# Patient Record
Sex: MALE | Race: BLACK OR AFRICAN AMERICAN | NOT HISPANIC OR LATINO | URBAN - METROPOLITAN AREA
[De-identification: names, ages, dates, MRNs, and addresses within clinical notes are randomized per-mention and may not be internally consistent; named-entity substitution may affect disease eponyms.]

---

## 2018-07-10 ENCOUNTER — INPATIENT (INPATIENT)
Facility: HOSPITAL | Age: 63
LOS: 13 days | Discharge: HOME | End: 2018-07-24
Attending: INTERNAL MEDICINE | Admitting: INTERNAL MEDICINE
Payer: MEDICAID

## 2018-07-10 VITALS
OXYGEN SATURATION: 100 % | HEIGHT: 62 IN | WEIGHT: 125 LBS | TEMPERATURE: 99 F | HEART RATE: 86 BPM | RESPIRATION RATE: 18 BRPM | DIASTOLIC BLOOD PRESSURE: 111 MMHG | SYSTOLIC BLOOD PRESSURE: 180 MMHG

## 2018-07-10 DIAGNOSIS — I10 ESSENTIAL (PRIMARY) HYPERTENSION: ICD-10-CM

## 2018-07-10 DIAGNOSIS — I63.9 CEREBRAL INFARCTION, UNSPECIFIED: ICD-10-CM

## 2018-07-10 DIAGNOSIS — G93.41 METABOLIC ENCEPHALOPATHY: ICD-10-CM

## 2018-07-10 LAB
ALBUMIN SERPL ELPH-MCNC: 3.8 G/DL — SIGNIFICANT CHANGE UP (ref 3.3–5)
ALP SERPL-CCNC: 104 U/L — SIGNIFICANT CHANGE UP (ref 40–120)
ALT FLD-CCNC: 33 U/L — SIGNIFICANT CHANGE UP (ref 12–78)
AMPHET UR-MCNC: NEGATIVE — SIGNIFICANT CHANGE UP
ANION GAP SERPL CALC-SCNC: 7 MMOL/L — SIGNIFICANT CHANGE UP (ref 5–17)
APPEARANCE UR: CLEAR — SIGNIFICANT CHANGE UP
APTT BLD: 38.3 SEC — HIGH (ref 27.5–37.4)
AST SERPL-CCNC: 27 U/L — SIGNIFICANT CHANGE UP (ref 15–37)
BACTERIA # UR AUTO: ABNORMAL
BARBITURATES UR SCN-MCNC: NEGATIVE — SIGNIFICANT CHANGE UP
BASOPHILS # BLD AUTO: 0.03 K/UL — SIGNIFICANT CHANGE UP (ref 0–0.2)
BASOPHILS NFR BLD AUTO: 0.4 % — SIGNIFICANT CHANGE UP (ref 0–2)
BENZODIAZ UR-MCNC: NEGATIVE — SIGNIFICANT CHANGE UP
BILIRUB SERPL-MCNC: 0.6 MG/DL — SIGNIFICANT CHANGE UP (ref 0.2–1.2)
BILIRUB UR-MCNC: NEGATIVE — SIGNIFICANT CHANGE UP
BUN SERPL-MCNC: 23 MG/DL — SIGNIFICANT CHANGE UP (ref 7–23)
CALCIUM SERPL-MCNC: 9.1 MG/DL — SIGNIFICANT CHANGE UP (ref 8.5–10.1)
CHLORIDE SERPL-SCNC: 105 MMOL/L — SIGNIFICANT CHANGE UP (ref 96–108)
CO2 SERPL-SCNC: 30 MMOL/L — SIGNIFICANT CHANGE UP (ref 22–31)
COCAINE METAB.OTHER UR-MCNC: NEGATIVE — SIGNIFICANT CHANGE UP
COLOR SPEC: YELLOW — SIGNIFICANT CHANGE UP
CREAT SERPL-MCNC: 1.11 MG/DL — SIGNIFICANT CHANGE UP (ref 0.5–1.3)
DIFF PNL FLD: NEGATIVE — SIGNIFICANT CHANGE UP
EOSINOPHIL # BLD AUTO: 0.16 K/UL — SIGNIFICANT CHANGE UP (ref 0–0.5)
EOSINOPHIL NFR BLD AUTO: 2.3 % — SIGNIFICANT CHANGE UP (ref 0–6)
EPI CELLS # UR: SIGNIFICANT CHANGE UP
GLUCOSE SERPL-MCNC: 86 MG/DL — SIGNIFICANT CHANGE UP (ref 70–99)
GLUCOSE UR QL: NEGATIVE MG/DL — SIGNIFICANT CHANGE UP
HBA1C BLD-MCNC: 5.7 % — HIGH (ref 4–5.6)
HCT VFR BLD CALC: 40.2 % — SIGNIFICANT CHANGE UP (ref 39–50)
HGB BLD-MCNC: 13.1 G/DL — SIGNIFICANT CHANGE UP (ref 13–17)
IMM GRANULOCYTES NFR BLD AUTO: 0.3 % — SIGNIFICANT CHANGE UP (ref 0–1.5)
INR BLD: 1.07 RATIO — SIGNIFICANT CHANGE UP (ref 0.88–1.16)
KETONES UR-MCNC: NEGATIVE — SIGNIFICANT CHANGE UP
LEUKOCYTE ESTERASE UR-ACNC: NEGATIVE — SIGNIFICANT CHANGE UP
LYMPHOCYTES # BLD AUTO: 2.36 K/UL — SIGNIFICANT CHANGE UP (ref 1–3.3)
LYMPHOCYTES # BLD AUTO: 33.6 % — SIGNIFICANT CHANGE UP (ref 13–44)
MCHC RBC-ENTMCNC: 28.9 PG — SIGNIFICANT CHANGE UP (ref 27–34)
MCHC RBC-ENTMCNC: 32.6 GM/DL — SIGNIFICANT CHANGE UP (ref 32–36)
MCV RBC AUTO: 88.5 FL — SIGNIFICANT CHANGE UP (ref 80–100)
METHADONE UR-MCNC: NEGATIVE — SIGNIFICANT CHANGE UP
MONOCYTES # BLD AUTO: 0.7 K/UL — SIGNIFICANT CHANGE UP (ref 0–0.9)
MONOCYTES NFR BLD AUTO: 10 % — SIGNIFICANT CHANGE UP (ref 2–14)
NEUTROPHILS # BLD AUTO: 3.76 K/UL — SIGNIFICANT CHANGE UP (ref 1.8–7.4)
NEUTROPHILS NFR BLD AUTO: 53.4 % — SIGNIFICANT CHANGE UP (ref 43–77)
NITRITE UR-MCNC: NEGATIVE — SIGNIFICANT CHANGE UP
NRBC # BLD: 0 /100 WBCS — SIGNIFICANT CHANGE UP (ref 0–0)
OPIATES UR-MCNC: NEGATIVE — SIGNIFICANT CHANGE UP
PCP SPEC-MCNC: SIGNIFICANT CHANGE UP
PCP UR-MCNC: NEGATIVE — SIGNIFICANT CHANGE UP
PH UR: 6 — SIGNIFICANT CHANGE UP (ref 5–8)
PLATELET # BLD AUTO: 316 K/UL — SIGNIFICANT CHANGE UP (ref 150–400)
POTASSIUM SERPL-MCNC: 3.5 MMOL/L — SIGNIFICANT CHANGE UP (ref 3.5–5.3)
POTASSIUM SERPL-SCNC: 3.5 MMOL/L — SIGNIFICANT CHANGE UP (ref 3.5–5.3)
PROT SERPL-MCNC: 8.6 GM/DL — HIGH (ref 6–8.3)
PROT UR-MCNC: 15 MG/DL
PROTHROM AB SERPL-ACNC: 11.7 SEC — SIGNIFICANT CHANGE UP (ref 9.8–12.7)
RBC # BLD: 4.54 M/UL — SIGNIFICANT CHANGE UP (ref 4.2–5.8)
RBC # FLD: 14.4 % — SIGNIFICANT CHANGE UP (ref 10.3–14.5)
RBC CASTS # UR COMP ASSIST: SIGNIFICANT CHANGE UP /HPF (ref 0–4)
SODIUM SERPL-SCNC: 142 MMOL/L — SIGNIFICANT CHANGE UP (ref 135–145)
SP GR SPEC: 1.01 — SIGNIFICANT CHANGE UP (ref 1.01–1.02)
THC UR QL: NEGATIVE — SIGNIFICANT CHANGE UP
UROBILINOGEN FLD QL: NEGATIVE MG/DL — SIGNIFICANT CHANGE UP
WBC # BLD: 7.03 K/UL — SIGNIFICANT CHANGE UP (ref 3.8–10.5)
WBC # FLD AUTO: 7.03 K/UL — SIGNIFICANT CHANGE UP (ref 3.8–10.5)
WBC UR QL: SIGNIFICANT CHANGE UP

## 2018-07-10 PROCEDURE — 93010 ELECTROCARDIOGRAM REPORT: CPT

## 2018-07-10 PROCEDURE — 99285 EMERGENCY DEPT VISIT HI MDM: CPT

## 2018-07-10 PROCEDURE — 70450 CT HEAD/BRAIN W/O DYE: CPT | Mod: 26

## 2018-07-10 PROCEDURE — 71045 X-RAY EXAM CHEST 1 VIEW: CPT | Mod: 26

## 2018-07-10 RX ORDER — LABETALOL HCL 100 MG
10 TABLET ORAL ONCE
Qty: 0 | Refills: 0 | Status: COMPLETED | OUTPATIENT
Start: 2018-07-10 | End: 2018-07-10

## 2018-07-10 RX ORDER — AMLODIPINE BESYLATE 2.5 MG/1
10 TABLET ORAL DAILY
Qty: 0 | Refills: 0 | Status: DISCONTINUED | OUTPATIENT
Start: 2018-07-10 | End: 2018-07-24

## 2018-07-10 RX ORDER — ASPIRIN/CALCIUM CARB/MAGNESIUM 324 MG
81 TABLET ORAL DAILY
Qty: 0 | Refills: 0 | Status: DISCONTINUED | OUTPATIENT
Start: 2018-07-10 | End: 2018-07-24

## 2018-07-10 RX ORDER — METOPROLOL TARTRATE 50 MG
50 TABLET ORAL
Qty: 0 | Refills: 0 | Status: DISCONTINUED | OUTPATIENT
Start: 2018-07-10 | End: 2018-07-24

## 2018-07-10 RX ORDER — ENOXAPARIN SODIUM 100 MG/ML
40 INJECTION SUBCUTANEOUS EVERY 24 HOURS
Qty: 0 | Refills: 0 | Status: DISCONTINUED | OUTPATIENT
Start: 2018-07-10 | End: 2018-07-24

## 2018-07-10 RX ORDER — SODIUM CHLORIDE 9 MG/ML
1000 INJECTION, SOLUTION INTRAVENOUS
Qty: 0 | Refills: 0 | Status: DISCONTINUED | OUTPATIENT
Start: 2018-07-10 | End: 2018-07-15

## 2018-07-10 RX ADMIN — ENOXAPARIN SODIUM 40 MILLIGRAM(S): 100 INJECTION SUBCUTANEOUS at 21:04

## 2018-07-10 RX ADMIN — Medication 81 MILLIGRAM(S): at 21:04

## 2018-07-10 RX ADMIN — Medication 10 MILLIGRAM(S): at 13:56

## 2018-07-10 RX ADMIN — SODIUM CHLORIDE 70 MILLILITER(S): 9 INJECTION, SOLUTION INTRAVENOUS at 21:05

## 2018-07-10 RX ADMIN — AMLODIPINE BESYLATE 10 MILLIGRAM(S): 2.5 TABLET ORAL at 21:04

## 2018-07-10 RX ADMIN — Medication 50 MILLIGRAM(S): at 22:59

## 2018-07-10 NOTE — H&P ADULT - NSHPLABSRESULTS_GEN_ALL_CORE
13.1   7.03  )-----------( 316      ( 10 Jul 2018 13:49 )             40.2     07-10    142  |  105  |  23  ----------------------------<  86  3.5   |  30  |  1.11    Ca    9.1      10 Jul 2018 13:49    TPro  8.6<H>  /  Alb  3.8  /  TBili  0.6  /  DBili  x   /  AST  27  /  ALT  33  /  AlkPhos  104  07-10    PT/INR - ( 10 Jul 2018 13:49 )   PT: 11.7 sec;   INR: 1.07 ratio         PTT - ( 10 Jul 2018 13:49 )  PTT:38.3 sec

## 2018-07-10 NOTE — ED ADULT NURSE NOTE - CHIEF COMPLAINT QUOTE
visiting from [facility] since June 5  expressive aphasia since possible Sunday or earlier unknown time with weakness to left side on exam by  Dr. Atwood  abnormal c-xry with recommend airborne precautions for [employer]

## 2018-07-10 NOTE — CONSULT NOTE ADULT - SUBJECTIVE AND OBJECTIVE BOX
Patient is a 63y old  Male who presents with a chief complaint of change in mental status (10 Jul 2018 15:20)    HPI:  64 yo male with history of abnormal cxr today in office presents with confusion x coming to [facility] June 5 from [facility]. As per sister patient seems confused and verbal not responsive at times (10 Jul 2018 15:20)  Pt confused, not able to provide history. Tried to call listed number() but unsuccessful.  seen for suspected TB.    PAST MEDICAL & SURGICAL HISTORY: not available    FAMILY HISTORY:  not available    SOCIAL HISTORY: BMI (kg/m2): 22.9 . not available    Allergies  No Known Allergies    MEDICATIONS  (STANDING):  amLODIPine   Tablet 10 milliGRAM(s) Oral daily  aspirin enteric coated 81 milliGRAM(s) Oral daily  enoxaparin Injectable 40 milliGRAM(s) SubCutaneous every 24 hours  sodium chloride 0.45%. 1000 milliLiter(s) (70 mL/Hr) IV Continuous <Continuous>    REVIEW OF SYSTEMS: not able to provide.    Vital Signs Last 24 Hrs  T(C): 36.9 (10 Jul 2018 18:07), Max: 37 (10 Jul 2018 12:00)  T(F): 98.5 (10 Jul 2018 18:07), Max: 98.6 (10 Jul 2018 12:00)  HR: 79 (10 Jul 2018 19:42) (79 - 86)  BP: 158/103 (10 Jul 2018 19:42) (158/103 - 180/111)  BP(mean): --  RR: 16 (10 Jul 2018 19:42) (16 - 18)  SpO2: 98% (10 Jul 2018 19:42) (98% - 100%)    PHYSICAL EXAM:  GEN:         Awake, responsive and comfortable. confused.  HEENT:    Normal.    RESP:       no wheezing.  CVS:          Regular rate and rhythm.   ABD:         Soft, non-tender, non-distended;   :             No costovertebral angle tenderness  SKIN:           Warm and dry.  EXTR:            No clubbing, cyanosis or edema  CNS:             confused  PSYCH:       confused.    LABS:                        13.1   7.03  )-----------( 316      ( 10 Jul 2018 13:49 )             40.2     07-10    142  |  105  |  23  ----------------------------<  86  3.5   |  30  |  1.11    Ca    9.1      10 Jul 2018 13:49    TPro  8.6<H>  /  Alb  3.8  /  TBili  0.6  /  DBili  x   /  AST  27  /  ALT  33  /  AlkPhos  104  07-10    PT/INR - ( 10 Jul 2018 13:49 )   PT: 11.7 sec;   INR: 1.07 ratio      PTT - ( 10 Jul 2018 13:49 )  PTT:38.3 sec    EKG: sinus rhythm    RADIOLOGY & ADDITIONAL STUDIES:  < from: CT Head No Cont (07.10.18 @ 14:27) >    EXAM:  CT BRAIN                          PROCEDURE DATE:  07/10/2018      INTERPRETATION:  CT BRAIN     Axial sections were obtained from base to vertex without contrast.    Clinical History: Slurred speech    Comparison: None    FINDINGS:    Small age-indeterminate right occipital infarct. Extensive   periventricular small vessel ischemic changes.    There is no mass, mass effect or midline shift. There are no intraaxial   or extraaxial fluid collections. Ventricular system and sulcal pattern   are within normal limits for the patient's age.    The visualized sinuses demonstrate mild sinusitis and mastoid air cells   are unremarkable.    Bones and soft tissues are normal.     IMPRESSION: No hemorrhage.Small age-indeterminate but likely subacute or   chronic right occipital infarct. Extensive periventricular small vessel   ischemic changes. MRI or follow-up CT may be considered.    Call was placed to reach Physician: CHERYL KEMP [phone] at   2:36 PM    JEAN CARLOS RON M.D., ATTENDING RADIOLOGIST  This document has been electronically signed. Jul 10 2018  2:41PM    < from: Xray Chest 1 View-PORTABLE IMMEDIATE (07.10.18 @ 13:49) >    EXAM:  XR CHEST PORTABLE IMMED 1V                          PROCEDURE DATE:  07/10/2018      INTERPRETATION:  CLINICAL INFORMATION:Possible tuberculosis    Comparison: None    TECHNIQUE: AP chest film. No prior studies available for comparison.    FINDINGS: There are faint patchy opacities at the left lung apex.  Heart   size cannot be accurately evaluated in this projection. The osseous   structures are intact.    IMPRESSION: Faint patchy opacities at the left lung apex. Chest CT is   recommended for further evaluation.     AKBAR CAMARILLO M.D., ATTENDING RADIOLOGIST  This document has been electronically signed. Jul 10 2018  3:27PM      ASSESSMENT AND PLAN:  ·	Altered mental status.  ·	Right occipital  infarct.  ·	Abnormal Chest X Ray.  ·	HTN  ·	Dehydration.    Continue IV hydration.  Will follow chest CT.  Obtain ABG, Ammonia, TSH, procalcitonin, QuantiFeron TB Gold.  Obtain ABG, procalcitonin, ammonia, Tsh

## 2018-07-10 NOTE — CONSULT NOTE ADULT - SUBJECTIVE AND OBJECTIVE BOX
HPI:  62 yo male with history of abnormal cxr today in office presents with confusion x coming to [facility] June 5 from [place]. As per sister patient seems confused and verbal not responsive at times (10 Jul 2018 15:20)      PAST MEDICAL & SURGICAL HISTORY:      SOCHX:   tobacco,  -  alcohol    FMHX: FA/MO  - contributory       Recent Travel:    Immunizations:    Allergies    No Known Allergies    Intolerances        MEDICATIONS  (STANDING):  amLODIPine   Tablet 10 milliGRAM(s) Oral daily  aspirin enteric coated 81 milliGRAM(s) Oral daily  enoxaparin Injectable 40 milliGRAM(s) SubCutaneous every 24 hours  sodium chloride 0.45%. 1000 milliLiter(s) (70 mL/Hr) IV Continuous <Continuous>    MEDICATIONS  (PRN):      REVIEW OF SYSTEMS:  CONSTITUTIONAL: No fever, weight loss, or fatigue  EYES: No eye pain, visual disturbances, or discharge  ENMT:  No difficulty hearing, tinnitus, vertigo; No sinus or throat pain  NECK: No pain or stiffness  BREASTS: No pain, masses, or nipple discharge  RESPIRATORY: No cough, wheezing, chills or hemoptysis; No shortness of breath  CARDIOVASCULAR: No chest pain, palpitations, dizziness, or leg swelling  GASTROINTESTINAL: No abdominal or epigastric pain. No nausea, vomiting, or hematemesis; No diarrhea or constipation. No melena or hematochezia.  GENITOURINARY: No dysuria, frequency, hematuria, or incontinence  NEUROLOGICAL: No headaches, memory loss, loss of strength, numbness, or tremors  SKIN: No itching, burning, rashes, or lesions   LYMPH NODES: No enlarged glands  ENDOCRINE: No heat or cold intolerance; No hair loss  MUSCULOSKELETAL: No joint pain or swelling; No muscle, back, or extremity pain  PSYCHIATRIC: No depression, anxiety, mood swings, or difficulty sleeping  HEME/LYMPH: No easy bruising, or bleeding gums  ALLERGY AND IMMUNOLOGIC: No hives or eczema    VITAL SIGNS:    T(C): 36.6 (07-10-18 @ 16:27), Max: 37 (07-10-18 @ 12:00)  T(F): 97.9 (07-10-18 @ 16:27), Max: 98.6 (07-10-18 @ 12:00)  HR: 83 (07-10-18 @ 16:27) (83 - 86)  BP: 170/107 (07-10-18 @ 16:27) (170/107 - 180/111)  RR: 18 (07-10-18 @ 16:27) (18 - 18)  SpO2: 98% (07-10-18 @ 16:27) (98% - 100%)    PHYSICAL EXAM:    GENERAL: NAD, well-groomed, well-developed  HEAD:  Atraumatic, Normocephalic  EYES: EOMI, PERRLA, conjunctiva and sclera clear  ENMT: No tonsillar erythema, exudates, or enlargement; Moist mucous membranes, Good dentition, No lesions  NECK: Supple, No JVD, Normal thyroid  NERVOUS SYSTEM:  Alert & Oriented X3, Good concentration; Motor Strength 5/5 B/L upper and lower extremities; DTRs 2+ intact and symmetric  CHEST/LUNG: Clear to auscultation bilaterally; No rales, rhonchi, wheezing bilaterally  HEART: Regular rate and rhythm; No murmurs, rubs, or gallops  ABDOMEN: Soft, Nontender, Nondistended; Bowel sounds present  EXTREMITIES:  2+ Peripheral Pulses, No clubbing, cyanosis, or edema  LYMPH: No lymphadenopathy noted  SKIN: No rashes or lesions  BACK: no pressor sore     LABS:                         13.1   7.03  )-----------( 316      ( 10 Jul 2018 13:49 )             40.2     07-10    142  |  105  |  23  ----------------------------<  86  3.5   |  30  |  1.11    Ca    9.1      10 Jul 2018 13:49    TPro  8.6<H>  /  Alb  3.8  /  TBili  0.6  /  DBili  x   /  AST  27  /  ALT  33  /  AlkPhos  104  07-10    LIVER FUNCTIONS - ( 10 Jul 2018 13:49 )  Alb: 3.8 g/dL / Pro: 8.6 gm/dL / ALK PHOS: 104 U/L / ALT: 33 U/L / AST: 27 U/L / GGT: x           PT/INR - ( 10 Jul 2018 13:49 )   PT: 11.7 sec;   INR: 1.07 ratio         PTT - ( 10 Jul 2018 13:49 )  PTT:38.3 sec                                      Radiology: HPI:  62 yo male with history of abnormal cxr today in office presents with confusion x coming to [facility] June 5 from [facility]. As per sister patient seems confused and verbal not responsive at times (10 Jul 2018 15:20)  this is new per her ; last she saw him was 2011 then applied for his immigration he was normal and well built; now confused since he came here from [facility]       PAST MEDICAL & SURGICAL HISTORY:      SOCHX:   no tobacco,  no-  alcohol    FMHX: FA/MO  non- contributory       Recent Travel:    Immunizations:    Allergies    No Known Allergies    Intolerances        MEDICATIONS  (STANDING):  amLODIPine   Tablet 10 milliGRAM(s) Oral daily  aspirin enteric coated 81 milliGRAM(s) Oral daily  enoxaparin Injectable 40 milliGRAM(s) SubCutaneous every 24 hours  sodium chloride 0.45%. 1000 milliLiter(s) (70 mL/Hr) IV Continuous <Continuous>    MEDICATIONS  (PRN):      REVIEW OF SYSTEMS:  unable to obtain   but has no complaints   CONSTITUTIONAL: No fever, weight loss, or fatigue  EYES: No eye pain, visual disturbances, or discharge  ENMT:  No difficulty hearing, tinnitus, vertigo; No sinus or throat pain  NECK: No pain or stiffness  RESPIRATORY: No cough, wheezing, chills or hemoptysis; No shortness of breath  CARDIOVASCULAR: No chest pain, palpitations, dizziness, or leg swelling  GASTROINTESTINAL: No abdominal or epigastric pain. No nausea, vomiting, or hematemesis; No diarrhea or constipation. No melena or hematochezia.  GENITOURINARY: No dysuria, frequency, hematuria, or incontinence  NEUROLOGICAL: No headaches, memory loss, loss of strength, numbness, or tremors  SKIN: No itching, burning, rashes, or lesions   LYMPH NODES: No enlarged glands  ENDOCRINE: No heat or cold intolerance; No hair loss  MUSCULOSKELETAL: No joint pain or swelling; No muscle, back, or extremity pain  PSYCHIATRIC: as per sister confused agitated  HEME/LYMPH: No easy bruising, or bleeding gums  ALLERGY AND IMMUNOLOGIC: No hives or eczema    VITAL SIGNS:    T(C): 36.6 (07-10-18 @ 16:27), Max: 37 (07-10-18 @ 12:00)  T(F): 97.9 (07-10-18 @ 16:27), Max: 98.6 (07-10-18 @ 12:00)  HR: 83 (07-10-18 @ 16:27) (83 - 86)  BP: 170/107 (07-10-18 @ 16:27) (170/107 - 180/111)  RR: 18 (07-10-18 @ 16:27) (18 - 18)  SpO2: 98% (07-10-18 @ 16:27) (98% - 100%)    PHYSICAL EXAM:  chronically ill appearing  GENERAL: NAD, cachectic   behaviour is strange  HEAD:  Atraumatic, Normocephalic  EYES: EOMI, PERRLA, conjunctiva and sclera clear  ENMT: No tonsillar erythema, exudates, or enlargement; Moist mucous membranes; no thrush   NECK: Supple, No JVD, Normal thyroid  NERVOUS SYSTEM:  Alert & Oriented X2, agitated getting in and out of bed  CHEST/LUNG: Clear to auscultation bilaterally; No rales, rhonchi, wheezing bilaterally  HEART: Regular rate and rhythm; No murmurs, rubs, or gallops  ABDOMEN: Soft, Nontender, Nondistended; Bowel sounds present  EXTREMITIES:  2+ Peripheral Pulses, No clubbing, cyanosis, or edema  LYMPH: No lymphadenopathy noted  SKIN: No rashes or lesions  BACK: no pressor sore     LABS:                         13.1   7.03  )-----------( 316      ( 10 Jul 2018 13:49 )             40.2     07-10    142  |  105  |  23  ----------------------------<  86  3.5   |  30  |  1.11    Ca    9.1      10 Jul 2018 13:49    TPro  8.6<H>  /  Alb  3.8  /  TBili  0.6  /  DBili  x   /  AST  27  /  ALT  33  /  AlkPhos  104  07-10    LIVER FUNCTIONS - ( 10 Jul 2018 13:49 )  Alb: 3.8 g/dL / Pro: 8.6 gm/dL / ALK PHOS: 104 U/L / ALT: 33 U/L / AST: 27 U/L / GGT: x           PT/INR - ( 10 Jul 2018 13:49 )   PT: 11.7 sec;   INR: 1.07 ratio         PTT - ( 10 Jul 2018 13:49 )  PTT:38.3 sec                                      Radiology:      < from: Xray Chest 1 View-PORTABLE IMMEDIATE (07.10.18 @ 13:49) >    IMPRESSION: Faint patchy opacities at the left lung apex. Chest CT is   recommended for further evaluation.                 AKBAR CAMARILLO M.D., ATTENDING RADIOLOGIST  This document has been electronically signed. Jul 10 2018  3:27PM    < end of copied text >

## 2018-07-10 NOTE — ED PROVIDER NOTE - MEDICAL DECISION MAKING DETAILS
Patient with slurred speech for 2 days, abnormal cxr presents for evaluation; will admit for bp control; Dr. Meek aware, Dr. Melton aware

## 2018-07-10 NOTE — H&P ADULT - HISTORY OF PRESENT ILLNESS
64 yo male with history of abnormal cxr today in office presents with confusion x coming to [facility] June 5 from [place]. As per sister patient seems confused and verbal not responsive at times

## 2018-07-10 NOTE — H&P ADULT - ASSESSMENT
62 yo male with history of abnormal cxr today in office presents with confusion x coming to [facility] June 5 from [place]. As per sister patient seems confused and verbal not responsive at times

## 2018-07-10 NOTE — ED PROVIDER NOTE - OBJECTIVE STATEMENT
64 yo male with hsitory of abnormal cxr today in office presents with confusion x coming to [facility] June 5 from [facility]. As per sister, she has not seen patient since 1996 and the patient seems confused. Sister states he has slurred speech for last 2 days. Denies cough, trauma, fall, abdominal pain, rash, weakness. +urinary frequency. Patient seen by Dr. Chapa yesterday. 62 yo male with history of abnormal cxr today in office presents with confusion x coming to [facility] June 5 from [facility]. As per sister, she has not seen patient since 1996 and the patient seems confused. Sister states he has slurred speech for last 2 days. Denies cough, trauma, fall, abdominal pain, rash, weakness. +urinary frequency. Patient seen by Dr. Chapa yesterday.

## 2018-07-10 NOTE — ED ADULT TRIAGE NOTE - SPO2 (%)
Pt is calling, states he was seen today in the walk in clinic, pt reports a prescription was suppose to be sent to [facility]. Pt reports he went to [facility] to  the prescription and it was never received by the pharmacy.    Pt chart and medications reviewed in EPIC,  Order noted for Ciprodex otic suspension,  rx re-sent to [facility] as written in pt chart.    Pt notified.           100

## 2018-07-10 NOTE — ED ADULT TRIAGE NOTE - CHIEF COMPLAINT QUOTE
visiting from [facility] since June 5  expressive aphasia since possible Sunday or earlier unknown time with weakness to left side on exam by  Dr. Atwood  abnormal c-xry with recommend airborne precautions for [employer]

## 2018-07-11 LAB
AMMONIA BLD-MCNC: 42 UMOL/L — HIGH (ref 11–32)
ANION GAP SERPL CALC-SCNC: 10 MMOL/L — SIGNIFICANT CHANGE UP (ref 5–17)
BASE EXCESS BLDA CALC-SCNC: 4.4 MMOL/L — HIGH (ref -2–2)
BLOOD GAS COMMENTS: SIGNIFICANT CHANGE UP
BLOOD GAS COMMENTS: SIGNIFICANT CHANGE UP
BLOOD GAS SOURCE: SIGNIFICANT CHANGE UP
BUN SERPL-MCNC: 20 MG/DL — SIGNIFICANT CHANGE UP (ref 7–23)
CALCIUM SERPL-MCNC: 9.2 MG/DL — SIGNIFICANT CHANGE UP (ref 8.5–10.1)
CHLORIDE SERPL-SCNC: 104 MMOL/L — SIGNIFICANT CHANGE UP (ref 96–108)
CHOLEST SERPL-MCNC: 186 MG/DL — SIGNIFICANT CHANGE UP (ref 10–199)
CO2 SERPL-SCNC: 27 MMOL/L — SIGNIFICANT CHANGE UP (ref 22–31)
CREAT SERPL-MCNC: 1.1 MG/DL — SIGNIFICANT CHANGE UP (ref 0.5–1.3)
CULTURE RESULTS: NO GROWTH — SIGNIFICANT CHANGE UP
ERYTHROCYTE [SEDIMENTATION RATE] IN BLOOD: 10 MM/HR — SIGNIFICANT CHANGE UP (ref 0–20)
GLUCOSE SERPL-MCNC: 82 MG/DL — SIGNIFICANT CHANGE UP (ref 70–99)
HCO3 BLDA-SCNC: 29 MMOL/L — SIGNIFICANT CHANGE UP (ref 21–29)
HCT VFR BLD CALC: 44.9 % — SIGNIFICANT CHANGE UP (ref 39–50)
HDLC SERPL-MCNC: 41 MG/DL — SIGNIFICANT CHANGE UP (ref 40–125)
HGB BLD-MCNC: 15 G/DL — SIGNIFICANT CHANGE UP (ref 13–17)
HIV 1+2 AB+HIV1 P24 AG SERPL QL IA: SIGNIFICANT CHANGE UP
HOROWITZ INDEX BLDA+IHG-RTO: 0.21 — SIGNIFICANT CHANGE UP
LIPID PNL WITH DIRECT LDL SERPL: 132 MG/DL — HIGH
MCHC RBC-ENTMCNC: 29.4 PG — SIGNIFICANT CHANGE UP (ref 27–34)
MCHC RBC-ENTMCNC: 33.4 GM/DL — SIGNIFICANT CHANGE UP (ref 32–36)
MCV RBC AUTO: 88 FL — SIGNIFICANT CHANGE UP (ref 80–100)
NIGHT BLUE STAIN TISS: SIGNIFICANT CHANGE UP
NRBC # BLD: 0 /100 WBCS — SIGNIFICANT CHANGE UP (ref 0–0)
PCO2 BLDA: 44 MMHG — SIGNIFICANT CHANGE UP (ref 32–46)
PH BLD: 7.44 — SIGNIFICANT CHANGE UP (ref 7.35–7.45)
PLATELET # BLD AUTO: 337 K/UL — SIGNIFICANT CHANGE UP (ref 150–400)
PO2 BLDA: 90 MMHG — SIGNIFICANT CHANGE UP (ref 74–108)
POTASSIUM SERPL-MCNC: 3.1 MMOL/L — LOW (ref 3.5–5.3)
POTASSIUM SERPL-SCNC: 3.1 MMOL/L — LOW (ref 3.5–5.3)
PROCALCITONIN SERPL-MCNC: 0.06 NG/ML — SIGNIFICANT CHANGE UP (ref 0.02–0.1)
RBC # BLD: 5.1 M/UL — SIGNIFICANT CHANGE UP (ref 4.2–5.8)
RBC # FLD: 13.9 % — SIGNIFICANT CHANGE UP (ref 10.3–14.5)
SAO2 % BLDA: 97 % — HIGH (ref 92–96)
SODIUM SERPL-SCNC: 141 MMOL/L — SIGNIFICANT CHANGE UP (ref 135–145)
SPECIMEN SOURCE: SIGNIFICANT CHANGE UP
SPECIMEN SOURCE: SIGNIFICANT CHANGE UP
TOTAL CHOLESTEROL/HDL RATIO MEASUREMENT: 4.5 RATIO — SIGNIFICANT CHANGE UP (ref 3.4–9.6)
TRIGL SERPL-MCNC: 63 MG/DL — SIGNIFICANT CHANGE UP (ref 10–149)
TSH SERPL-MCNC: 2.15 UIU/ML — SIGNIFICANT CHANGE UP (ref 0.36–3.74)
TSH SERPL-MCNC: 3.07 UIU/ML — SIGNIFICANT CHANGE UP (ref 0.36–3.74)
VIT B12 SERPL-MCNC: 326 PG/ML — SIGNIFICANT CHANGE UP (ref 232–1245)
WBC # BLD: 4.67 K/UL — SIGNIFICANT CHANGE UP (ref 3.8–10.5)
WBC # FLD AUTO: 4.67 K/UL — SIGNIFICANT CHANGE UP (ref 3.8–10.5)

## 2018-07-11 RX ADMIN — ENOXAPARIN SODIUM 40 MILLIGRAM(S): 100 INJECTION SUBCUTANEOUS at 21:30

## 2018-07-11 RX ADMIN — Medication 50 MILLIGRAM(S): at 05:47

## 2018-07-11 RX ADMIN — Medication 81 MILLIGRAM(S): at 13:46

## 2018-07-11 RX ADMIN — Medication 50 MILLIGRAM(S): at 17:52

## 2018-07-11 RX ADMIN — AMLODIPINE BESYLATE 10 MILLIGRAM(S): 2.5 TABLET ORAL at 05:47

## 2018-07-11 NOTE — PROGRESS NOTE ADULT - SUBJECTIVE AND OBJECTIVE BOX
Patient is a 63y old  Male who presents with a chief complaint of change in mental status (10 Jul 2018 15:20)      INTERVAL HPI/OVERNIGHT EVENTS:  pt looks much better, more awake as per sister  MEDICATIONS  (STANDING):  amLODIPine   Tablet 10 milliGRAM(s) Oral daily  aspirin enteric coated 81 milliGRAM(s) Oral daily  enoxaparin Injectable 40 milliGRAM(s) SubCutaneous every 24 hours  metoprolol tartrate 50 milliGRAM(s) Oral two times a day  sodium chloride 0.45%. 1000 milliLiter(s) (70 mL/Hr) IV Continuous <Continuous>    MEDICATIONS  (PRN):      Allergies    No Known Allergies    Intolerances        REVIEW OF SYSTEMS:  CONSTITUTIONAL: No fever, weight loss, or fatigue  EYES: No eye pain, visual disturbances, or discharge  ENMT:  No difficulty hearing, tinnitus, vertigo; No sinus or throat pain  NECK: No pain or stiffness  BREASTS: No pain, masses, or nipple discharge  RESPIRATORY: No cough, wheezing, chills or hemoptysis; No shortness of breath  CARDIOVASCULAR: No chest pain, palpitations, dizziness, or leg swelling  GASTROINTESTINAL: No abdominal or epigastric pain. No nausea, vomiting, or hematemesis; No diarrhea or constipation. No melena or hematochezia.  GENITOURINARY: No dysuria, frequency, hematuria, or incontinence  NEUROLOGICAL: No headaches, memory loss, loss of strength, numbness, or tremors  SKIN: No itching, burning, rashes, or lesions   LYMPH NODES: No enlarged glands  ENDOCRINE: No heat or cold intolerance; No hair loss  MUSCULOSKELETAL: No joint pain or swelling; No muscle, back, or extremity pain  PSYCHIATRIC: No depression, anxiety, mood swings, or difficulty sleeping  HEME/LYMPH: No easy bruising, or bleeding gums  ALLERGY AND IMMUNOLOGIC: No hives or eczema    Vital Signs Last 24 Hrs  T(C): 36.9 (2018 05:27), Max: 37 (10 Jul 2018 12:00)  T(F): 98.4 (2018 05:27), Max: 98.6 (10 Jul 2018 12:00)  HR: 84 (2018 05:27) (74 - 92)  BP: 163/110 (2018 05:27) (155/98 - 180/111)  BP(mean): --  RR: 16 (2018 05:27) (14 - 18)  SpO2: 100% (2018 05:27) (98% - 100%)    PHYSICAL EXAM:  GENERAL: NAD, well-groomed, well-developed  HEAD:  Atraumatic, Normocephalic  EYES: EOMI, PERRLA, conjunctiva and sclera clear  ENMT: No tonsillar erythema, exudates, or enlargement; Moist mucous membranes, Good dentition, No lesions  NECK: Supple, No JVD, Normal thyroid  NERVOUS SYSTEM:  Alert & Oriented X3, Good concentration; Motor Strength 5/5 B/L upper and lower extremities; DTRs 2+ intact and symmetric  CHEST/LUNG: Clear to percussion bilaterally; No rales, rhonchi, wheezing, or rubs  HEART: Regular rate and rhythm; No murmurs, rubs, or gallops  ABDOMEN: Soft, Nontender, Nondistended; Bowel sounds present  EXTREMITIES:  2+ Peripheral Pulses, No clubbing, cyanosis, or edema  LYMPH: No lymphadenopathy noted  SKIN: No rashes or lesions    LABS:                        15.0   4.67  )-----------( 337      ( 2018 07:50 )             44.9     07-11    141  |  104  |  20  ----------------------------<  82  3.1<L>   |  27  |  1.10    Ca    9.2      2018 07:50    TPro  8.6<H>  /  Alb  3.8  /  TBili  0.6  /  DBili  x   /  AST  27  /  ALT  33  /  AlkPhos  104  07-10    PT/INR - ( 10 Jul 2018 13:49 )   PT: 11.7 sec;   INR: 1.07 ratio         PTT - ( 10 Jul 2018 13:49 )  PTT:38.3 sec  Urinalysis Basic - ( 10 Jul 2018 20:58 )    Color: Yellow / Appearance: Clear / S.010 / pH: x  Gluc: x / Ketone: Negative  / Bili: Negative / Urobili: Negative mg/dL   Blood: x / Protein: 15 mg/dL / Nitrite: Negative   Leuk Esterase: Negative / RBC: 0-2 /HPF / WBC 0-2   Sq Epi: x / Non Sq Epi: Occasional / Bacteria: Occasional      CAPILLARY BLOOD GLUCOSE        CULTURES:    HEMOGLOBIN A1C:  Hemoglobin A1C, Whole Blood: 5.7 % (07-10-18 @ 18:46)    CHOLESTEROL:    ABG - ( 2018 00:30 )  pH, Arterial: x     pH, Blood: 7.44  /  pCO2: 44    /  pO2: 90    / HCO3: 29    / Base Excess: 4.4   /  SaO2: 97                  RADIOLOGY & ADDITIONAL TESTS:

## 2018-07-11 NOTE — PROGRESS NOTE ADULT - SUBJECTIVE AND OBJECTIVE BOX
INTERVAL HPI:  62 yo male with history of abnormal cxr( was taken for immigration purpose in [place] per sister).  Arrived in [place] on  with Immigrant Visa.  presented  with confusion As per sister patient seems confused and nonverbal ,  responsive at times (10 Jul 2018 15:20)  Pt confused, not able to provide history. Spoke with sister at bedside. According to her no sob, cough, fever, night sweat or weight loss.  seen for suspected TB as CXR reported abnormal before coming to [place].    OVERNIGHT EVENTS:  Comfortable, still confused.    Vital Signs Last 24 Hrs  T(C): 36.7 (2018 11:30), Max: 36.9 (10 Jul 2018 18:07)  T(F): 98 (2018 11:30), Max: 98.5 (10 Jul 2018 18:07)  HR: 81 (2018 11:30) (74 - 92)  BP: 161/97 (2018 11:30) (155/98 - 171/89)  BP(mean): --  RR: 16 (2018 11:30) (14 - 17)  SpO2: 98% (2018 11:30) (98% - 100%)    PHYSICAL EXAM:  GEN:         Awake, responsive but restless..  HEENT:    Normal.    RESP:       no wheezing..  CVS:          Regular rate and rhythm.   ABD:         Soft, non-tender, non-distended;     MEDICATIONS  (STANDING):  amLODIPine   Tablet 10 milliGRAM(s) Oral daily  aspirin enteric coated 81 milliGRAM(s) Oral daily  enoxaparin Injectable 40 milliGRAM(s) SubCutaneous every 24 hours  metoprolol tartrate 50 milliGRAM(s) Oral two times a day  sodium chloride 0.45%. 1000 milliLiter(s) (70 mL/Hr) IV Continuous <Continuous>    LABS:                        15.0   4.67  )-----------( 337      ( 2018 07:50 )             44.9     07-11    141  |  104  |  20  ----------------------------<  82  3.1<L>   |  27  |  1.10    Ca    9.2      2018 07:50    TPro  8.6<H>  /  Alb  3.8  /  TBili  0.6  /  DBili  x   /  AST  27  /  ALT  33  /  AlkPhos  104  07-10    PT/INR - ( 10 Jul 2018 13:49 )   PT: 11.7 sec;   INR: 1.07 ratio      PTT - ( 10 Jul 2018 13:49 )  PTT:38.3 sec   @ 00:30  pH: 7.44  pCO2: 44  pO2: 90  SaO2: 97    Urinalysis Basic - ( 10 Jul 2018 20:58 )    Color: Yellow / Appearance: Clear / S.010 / pH: x  Gluc: x / Ketone: Negative  / Bili: Negative / Urobili: Negative mg/dL   Blood: x / Protein: 15 mg/dL / Nitrite: Negative   Leuk Esterase: Negative / RBC: 0-2 /HPF / WBC 0-2   Sq Epi: x / Non Sq Epi: Occasional / Bacteria: Occasional    < from: Xray Chest 1 View-PORTABLE IMMEDIATE (07.10.18 @ 13:49) >    EXAM:  XR CHEST PORTABLE IMMED 1V                          PROCEDURE DATE:  07/10/2018      INTERPRETATION:  CLINICAL INFORMATION:Possible tuberculosis    Comparison: None    TECHNIQUE: AP chest film. No prior studies available for comparison.    FINDINGS: There are faint patchy opacities at the left lung apex.  Heart   size cannot be accurately evaluated in this projection. The osseous   structures are intact.    IMPRESSION: Faint patchy opacities at the left lung apex. Chest CT is   recommended for further evaluation.     AKBAR CAMARILLO M.D., ATTENDING RADIOLOGIST  This document has been electronically signed. Jul 10 2018  3:27PM      ASSESSMENT AND PLAN:  ·	Altered mental status.  ·	Right occipital  infarct.  ·	Abnormal Chest X Ray.  ·	HTN  ·	Dehydration.    Doubt pulmonary TB as no respiratory symptoms and sed rate is normal.

## 2018-07-11 NOTE — PHYSICAL THERAPY INITIAL EVALUATION ADULT - GENERAL OBSERVATIONS, REHAB EVAL
Pt is on airborne precaution. Pt was seen in supine, alert and oriented to name and place. Pt's sister Mecedes at bedside. Pt refused Cyracom and was able to follow simple instruction, c repetitions as needed and  the translation by sister, Mecedes throughout P.T. intervention. Pt has difficulty in finding words sometimes.

## 2018-07-11 NOTE — PROGRESS NOTE ADULT - ASSESSMENT
[place] pulmo TB    Pending CT chest   HIV pending   sputum AFB pending , need 3 sets   continue isolation   no fevers , no cough   Await ct chest before starting antibiotics   Quateferon test   dementia work up   we bairon lfu

## 2018-07-11 NOTE — PROGRESS NOTE ADULT - SUBJECTIVE AND OBJECTIVE BOX
62 yo male with history of abnormal cxr today in office presents with confusion x coming to [facility]  from [place]. As per sister patient seems confused and verbal not responsive at times (10 Jul 2018 15:20)      Allergies    No Known Allergies    Intolerances        MEDICATIONS  (STANDING):  amLODIPine   Tablet 10 milliGRAM(s) Oral daily  aspirin enteric coated 81 milliGRAM(s) Oral daily  enoxaparin Injectable 40 milliGRAM(s) SubCutaneous every 24 hours  metoprolol tartrate 50 milliGRAM(s) Oral two times a day  sodium chloride 0.45%. 1000 milliLiter(s) (70 mL/Hr) IV Continuous <Continuous>    MEDICATIONS  (PRN):      REVIEW OF SYSTEMS:    CONSTITUTIONAL: No fever, chills, weight loss, or fatigue  HEENT: No sore throat, runny nose, ear ache  RESPIRATORY: No cough, wheezing, No shortness of breath  CARDIOVASCULAR: No chest pain, palpitations, dizziness  GASTROINTESTINAL: No abdominal pain. No nausea, vomiting, diarrhea  GENITOURINARY: No dysuria, increase frequency, hematuria, or incontinence  NEUROLOGICAL: No headaches, memory loss, loss of strength, numbness, or tremors, no weakness  EXTREMITY: No pedal edema BLE  SKIN: No itching, burning, rashes, or lesions     VITAL SIGNS:  T(C): 36.9 (18 @ 05:27), Max: 37 (07-10-18 @ 12:00)  T(F): 98.4 (18 @ :27), Max: 98.6 (07-10-18 @ 12:00)  HR: 84 (18 @ 05:27) (74 - 92)  BP: 163/110 (18 @ 05:27) (155/98 - 180/111)  RR: 16 (18 @ 05:27) (14 - 18)  SpO2: 100% (18 @ 05:27) (98% - 100%)  Wt(kg): --    PHYSICAL EXAM: pt does not speak english, sister at bedside,      GENERAL: not in any distress  HEENT: Neck is supple, normocephalic, atraumatic   CHEST/LUNG: Clear to percussion bilaterally; No rales, rhonchi, wheezing  HEART: Regular rate and rhythm; No murmurs, rubs, or gallops  ABDOMEN: Soft, Nontender, Nondistended; Bowel sounds present, no rebound   EXTREMITIES:  2+ Peripheral Pulses, No clubbing, cyanosis, or edema  GENITOURINARY:   SKIN: No rashes or lesions  BACK: no pressor sore   NERVOUS SYSTEM:  Alert & Oriented X3, Good concentration  PSYCH: normal affect     LABS:                         15.0   4.67  )-----------( 337      ( 2018 07:50 )             44.9     07-11    141  |  104  |  20  ----------------------------<  82  3.1<L>   |  27  |  1.10    Ca    9.2      2018 07:50    TPro  8.6<H>  /  Alb  3.8  /  TBili  0.6  /  DBili  x   /  AST  27  /  ALT  33  /  AlkPhos  104  07-10    LIVER FUNCTIONS - ( 10 Jul 2018 13:49 )  Alb: 3.8 g/dL / Pro: 8.6 gm/dL / ALK PHOS: 104 U/L / ALT: 33 U/L / AST: 27 U/L / GGT: x           PT/INR - ( 10 Jul 2018 13:49 )   PT: 11.7 sec;   INR: 1.07 ratio         PTT - ( 10 Jul 2018 13:49 )  PTT:38.3 sec  Urinalysis Basic - ( 10 Jul 2018 20:58 )    Color: Yellow / Appearance: Clear / S.010 / pH: x  Gluc: x / Ketone: Negative  / Bili: Negative / Urobili: Negative mg/dL   Blood: x / Protein: 15 mg/dL / Nitrite: Negative   Leuk Esterase: Negative / RBC: 0-2 /HPF / WBC 0-2   Sq Epi: x / Non Sq Epi: Occasional / Bacteria: Occasional      ABG - ( 2018 00:30 )  pH, Arterial: x     pH, Blood: 7.44  /  pCO2: 44    /  pO2: 90    / HCO3: 29    / Base Excess: 4.4   /  SaO2: 97                    Thyroid Stimulating Hormone, Serum: 2.150 uIU/mL ( @ 07:50)      Sedimentation Rate, Erythrocyte: 10 mm/hr ( @ 07:50)                          Radiology:

## 2018-07-12 DIAGNOSIS — R41.82 ALTERED MENTAL STATUS, UNSPECIFIED: ICD-10-CM

## 2018-07-12 LAB
FOLATE SERPL-MCNC: 13.7 NG/ML — SIGNIFICANT CHANGE UP
LEGIONELLA AG UR QL: NEGATIVE — SIGNIFICANT CHANGE UP
NIGHT BLUE STAIN TISS: SIGNIFICANT CHANGE UP
RPR SER-TITR: (no result)
RPR SERPL-ACNC: REACTIVE
SPECIMEN SOURCE: SIGNIFICANT CHANGE UP
T PALLIDUM AB TITR SER: POSITIVE

## 2018-07-12 RX ORDER — PREGABALIN 225 MG/1
1000 CAPSULE ORAL DAILY
Qty: 0 | Refills: 0 | Status: DISCONTINUED | OUTPATIENT
Start: 2018-07-12 | End: 2018-07-24

## 2018-07-12 RX ADMIN — Medication 81 MILLIGRAM(S): at 13:29

## 2018-07-12 RX ADMIN — AMLODIPINE BESYLATE 10 MILLIGRAM(S): 2.5 TABLET ORAL at 05:53

## 2018-07-12 RX ADMIN — SODIUM CHLORIDE 70 MILLILITER(S): 9 INJECTION, SOLUTION INTRAVENOUS at 20:22

## 2018-07-12 RX ADMIN — ENOXAPARIN SODIUM 40 MILLIGRAM(S): 100 INJECTION SUBCUTANEOUS at 20:22

## 2018-07-12 RX ADMIN — Medication 50 MILLIGRAM(S): at 18:33

## 2018-07-12 RX ADMIN — Medication 50 MILLIGRAM(S): at 05:53

## 2018-07-12 RX ADMIN — SODIUM CHLORIDE 70 MILLILITER(S): 9 INJECTION, SOLUTION INTRAVENOUS at 05:54

## 2018-07-12 RX ADMIN — PREGABALIN 1000 MICROGRAM(S): 225 CAPSULE ORAL at 13:29

## 2018-07-12 NOTE — PROGRESS NOTE ADULT - SUBJECTIVE AND OBJECTIVE BOX
62 yo male with history of abnormal cxr today in office presents with confusion x coming to [facility]  from [place]. As per sister patient seems confused and verbal not responsive at times (10 Jul 2018 15:20)  hiv NEGATIVE   neuro work up noted  afb x 2 so far NEGATIVE     Allergies    No Known Allergies    Intolerances        MEDICATIONS  (STANDING):  amLODIPine   Tablet 10 milliGRAM(s) Oral daily  aspirin enteric coated 81 milliGRAM(s) Oral daily  cyanocobalamin Injectable 1000 MICROGram(s) IntraMuscular daily  enoxaparin Injectable 40 milliGRAM(s) SubCutaneous every 24 hours  metoprolol tartrate 50 milliGRAM(s) Oral two times a day  sodium chloride 0.45%. 1000 milliLiter(s) (70 mL/Hr) IV Continuous <Continuous>    MEDICATIONS  (PRN):      REVIEW OF SYSTEMS:    alert pleasantly confused  VITAL SIGNS:  T(C): 36.9 (18 @ 16:54), Max: 36.9 (18 @ 16:54)  T(F): 98.4 (18 @ 16:54), Max: 98.4 (18 @ 16:54)  HR: 79 (18 @ 16:54) (70 - 89)  BP: 166/102 (18 @ 16:54) (149/84 - 172/110)  RR: 17 (18 @ 16:54) (16 - 17)  SpO2: 99% (18 @ 16:54) (97% - 100%)  Wt(kg): --    PHYSICAL EXAM:    GENERAL: not in any distress  HEENT: Neck is supple, normocephalic, atraumatic   CHEST/LUNG: Clear to auscultation bilaterally; No rales, rhonchi, wheezing  HEART: Regular rate and rhythm; No murmurs, rubs, or gallops  ABDOMEN: Soft, Nontender, Nondistended; Bowel sounds present, no rebound   EXTREMITIES:  2+ Peripheral Pulses, No clubbing, cyanosis, or edema  SKIN: No rashes or lesions  BACK: no pressor sore   NERVOUS SYSTEM:  Alert & Oriented X1  PSYCH: confued    LABS:                         15.0   4.67  )-----------( 337      ( 2018 07:50 )             44.9     07-11    141  |  104  |  20  ----------------------------<  82  3.1<L>   |  27  |  1.10    Ca    9.2      2018 07:50          Urinalysis Basic - ( 10 Jul 2018 20:58 )    Color: Yellow / Appearance: Clear / S.010 / pH: x  Gluc: x / Ketone: Negative  / Bili: Negative / Urobili: Negative mg/dL   Blood: x / Protein: 15 mg/dL / Nitrite: Negative   Leuk Esterase: Negative / RBC: 0-2 /HPF / WBC 0-2   Sq Epi: x / Non Sq Epi: Occasional / Bacteria: Occasional      ABG - ( 2018 00:30 )  pH, Arterial: x     pH, Blood: 7.44  /  pCO2: 44    /  pO2: 90    / HCO3: 29    / Base Excess: 4.4   /  SaO2: 97                    Thyroid Stimulating Hormone, Serum: 2.150 uIU/mL ( @ 07:50)      Sedimentation Rate, Erythrocyte: 10 mm/hr ( @ 07:50)            Culture Results:   No growth (07-10 @ 23:03)          Legionella Antigen, Urine: Negative ( @ 14:52)        Radiology:

## 2018-07-12 NOTE — PROGRESS NOTE ADULT - SUBJECTIVE AND OBJECTIVE BOX
Patient is a 63y old  Male who presents with a chief complaint of change in mental status (10 Jul 2018 15:20)      INTERVAL HPI/OVERNIGHT EVENTS:  pt is doing better, awake alert  MEDICATIONS  (STANDING):  amLODIPine   Tablet 10 milliGRAM(s) Oral daily  aspirin enteric coated 81 milliGRAM(s) Oral daily  enoxaparin Injectable 40 milliGRAM(s) SubCutaneous every 24 hours  metoprolol tartrate 50 milliGRAM(s) Oral two times a day  sodium chloride 0.45%. 1000 milliLiter(s) (70 mL/Hr) IV Continuous <Continuous>    MEDICATIONS  (PRN):      Allergies    No Known Allergies    Intolerances        REVIEW OF SYSTEMS:  CONSTITUTIONAL: No fever, weight loss, or fatigue  EYES: No eye pain, visual disturbances, or discharge  ENMT:  No difficulty hearing, tinnitus, vertigo; No sinus or throat pain  NECK: No pain or stiffness  BREASTS: No pain, masses, or nipple discharge  RESPIRATORY: No cough, wheezing, chills or hemoptysis; No shortness of breath  CARDIOVASCULAR: No chest pain, palpitations, dizziness, or leg swelling  GASTROINTESTINAL: No abdominal or epigastric pain. No nausea, vomiting, or hematemesis; No diarrhea or constipation. No melena or hematochezia.  GENITOURINARY: No dysuria, frequency, hematuria, or incontinence  NEUROLOGICAL: No headaches, memory loss, loss of strength, numbness, or tremors  SKIN: No itching, burning, rashes, or lesions   LYMPH NODES: No enlarged glands  ENDOCRINE: No heat or cold intolerance; No hair loss  MUSCULOSKELETAL: No joint pain or swelling; No muscle, back, or extremity pain  PSYCHIATRIC: No depression, anxiety, mood swings, or difficulty sleeping  HEME/LYMPH: No easy bruising, or bleeding gums  ALLERGY AND IMMUNOLOGIC: No hives or eczema    Vital Signs Last 24 Hrs  T(C): 36.3 (2018 05:27), Max: 36.7 (2018 17:30)  T(F): 97.4 (2018 05:27), Max: 98.1 (2018 17:30)  HR: 89 (2018 05:27) (70 - 89)  BP: 172/110 (2018 05:27) (156/93 - 172/110)  BP(mean): --  RR: 17 (2018 05:27) (16 - 17)  SpO2: 97% (2018 05:27) (97% - 100%)    PHYSICAL EXAM:  GENERAL: NAD, well-groomed, well-developed  HEAD:  Atraumatic, Normocephalic  EYES: EOMI, PERRLA, conjunctiva and sclera clear  ENMT: No tonsillar erythema, exudates, or enlargement; Moist mucous membranes, Good dentition, No lesions  NECK: Supple, No JVD, Normal thyroid  NERVOUS SYSTEM:  Alert & Oriented X3, Good concentration; Motor Strength 5/5 B/L upper and lower extremities; DTRs 2+ intact and symmetric  CHEST/LUNG: Clear to percussion bilaterally; No rales, rhonchi, wheezing, or rubs  HEART: Regular rate and rhythm; No murmurs, rubs, or gallops  ABDOMEN: Soft, Nontender, Nondistended; Bowel sounds present  EXTREMITIES:  2+ Peripheral Pulses, No clubbing, cyanosis, or edema  LYMPH: No lymphadenopathy noted  SKIN: No rashes or lesions    LABS:                        15.0   4.67  )-----------( 337      ( 2018 07:50 )             44.9     07-11    141  |  104  |  20  ----------------------------<  82  3.1<L>   |  27  |  1.10    Ca    9.2      2018 07:50    TPro  8.6<H>  /  Alb  3.8  /  TBili  0.6  /  DBili  x   /  AST  27  /  ALT  33  /  AlkPhos  104  07-10    PT/INR - ( 10 Jul 2018 13:49 )   PT: 11.7 sec;   INR: 1.07 ratio         PTT - ( 10 Jul 2018 13:49 )  PTT:38.3 sec  Urinalysis Basic - ( 10 Jul 2018 20:58 )    Color: Yellow / Appearance: Clear / S.010 / pH: x  Gluc: x / Ketone: Negative  / Bili: Negative / Urobili: Negative mg/dL   Blood: x / Protein: 15 mg/dL / Nitrite: Negative   Leuk Esterase: Negative / RBC: 0-2 /HPF / WBC 0-2   Sq Epi: x / Non Sq Epi: Occasional / Bacteria: Occasional      CAPILLARY BLOOD GLUCOSE        CULTURES:  Culture Results:   No growth (07-10 @ 23:03)    HEMOGLOBIN A1C:    CHOLESTEROL:  Cholesterol, Serum: 186 mg/dL (18 @ 09:57)  HDL Cholesterol, Serum: 41 mg/dL (18 @ 09:57)  Triglycerides, Serum: 63 mg/dL (18 @ 09:57)    ABG - ( 2018 00:30 )  pH, Arterial: x     pH, Blood: 7.44  /  pCO2: 44    /  pO2: 90    / HCO3: 29    / Base Excess: 4.4   /  SaO2: 97                  RADIOLOGY & ADDITIONAL TESTS:

## 2018-07-12 NOTE — PROGRESS NOTE ADULT - SUBJECTIVE AND OBJECTIVE BOX
INTERVAL HPI:  64 yo male with history of abnormal cxr( was taken for immigration purpose in [place] per sister).  [place] in [place] on  with Immigrant Visa.  presented  with confusion As per sister patient seems confused and nonverbal ,  responsive at times (10 Jul 2018 15:20)  Pt confused, not able to provide history. Spoke with sister at bedside. According to her no sob, cough, fever, night sweat or weight loss.  seen for suspected TB as CXR reported abnormal before coming to [place].    OVERNIGHT EVENTS:  Comfortable, no distress.    Vital Signs Last 24 Hrs  T(C): 36.2 (2018 11:35), Max: 36.7 (2018 17:30)  T(F): 97.1 (2018 11:35), Max: 98.1 (2018 17:30)  HR: 74 (2018 11:35) (70 - 89)  BP: 149/84 (2018 11:35) (149/84 - 172/110)  BP(mean): --  RR: 16 (2018 11:35) (16 - 17)  SpO2: 100% (2018 11:35) (97% - 100%)    PHYSICAL EXAM:  GEN:         Awake, responsive and comfortable.  HEENT:    Normal.    RESP:        no wheezing.  CVS:          Regular rate and rhythm.   ABD:         Soft, non-tender, non-distended;     MEDICATIONS  (STANDING):  amLODIPine   Tablet 10 milliGRAM(s) Oral daily  aspirin enteric coated 81 milliGRAM(s) Oral daily  cyanocobalamin Injectable 1000 MICROGram(s) IntraMuscular daily  enoxaparin Injectable 40 milliGRAM(s) SubCutaneous every 24 hours  metoprolol tartrate 50 milliGRAM(s) Oral two times a day  sodium chloride 0.45%. 1000 milliLiter(s) (70 mL/Hr) IV Continuous <Continuous>    LABS:                        15.0   4.67  )-----------( 337      ( 2018 07:50 )             44.9     07-11    141  |  104  |  20  ----------------------------<  82  3.1<L>   |  27  |  1.10    Ca    9.2      2018 07:50    TPro  8.6<H>  /  Alb  3.8  /  TBili  0.6  /  DBili  x   /  AST  27  /  ALT  33  /  AlkPhos  104  07-10    PT/INR - ( 10 Jul 2018 13:49 )   PT: 11.7 sec;   INR: 1.07 ratio       PTT - ( 10 Jul 2018 13:49 )  PTT:38.3 sec  0711 @ 00:30  pH: 7.44  pCO2: 44  pO2: 90  SaO2: 97    Urinalysis Basic - ( 10 Jul 2018 20:58 )    Color: Yellow / Appearance: Clear / S.010 / pH: x  Gluc: x / Ketone: Negative  / Bili: Negative / Urobili: Negative mg/dL   Blood: x / Protein: 15 mg/dL / Nitrite: Negative   Leuk Esterase: Negative / RBC: 0-2 /HPF / WBC 0-2   Sq Epi: x / Non Sq Epi: Occasional / Bacteria: Occasional    ASSESSMENT AND PLAN:  ·	Altered mental status.  ·	Right occipital  infarct.  ·	Abnormal Chest X Ray.  ·	HTN  ·	Dehydration.    Seen by Neurology for confusion.  No pulmonary symptoms.

## 2018-07-12 NOTE — PROGRESS NOTE ADULT - ASSESSMENT
[place] pulmo TB  esr is so low; no fevers ; this is not consistent with active TB ;; need CT chest   Pending CT chest   HIV pending   sputum AFB pending , need 3 sets   continue isolation   no fevers , no cough   Await ct chest before starting antibiotics   Quanteferon test   dementia work up   will chaeck ACE level

## 2018-07-12 NOTE — CONSULT NOTE ADULT - SUBJECTIVE AND OBJECTIVE BOX
Subjective Complaints:  Historian:       Consult requested by ER doctor:                  Attending:     HPI:  62 yo male with history of abnormal cxr today in office presents with confusion x coming to [facility]  from [facility]. As per sister patient seems confused and verbal not responsive at times (10 Jul 2018 15:20)    JENNIFER DUQUE    PAST MEDICAL & SURGICAL HISTORY:  63yMale    MEDICATIONS  (STANDING):  amLODIPine   Tablet 10 milliGRAM(s) Oral daily  aspirin enteric coated 81 milliGRAM(s) Oral daily  enoxaparin Injectable 40 milliGRAM(s) SubCutaneous every 24 hours  metoprolol tartrate 50 milliGRAM(s) Oral two times a day  sodium chloride 0.45%. 1000 milliLiter(s) (70 mL/Hr) IV Continuous <Continuous>    MEDICATIONS  (PRN):      Allergies    No Known Allergies    Intolerances      FAMILY HISTORY:      REVIEW OF SYSTEMS:  General:  No wt loss, fevers, chills, night sweats  Eyes:  Good vision, no reported pain  ENT:  No sore throat, pain, runny nose, dysphagia  CV:  No pain, palpitatioins, hypo/hypertension  Resp:  No dyspnea, cough, tachypnea, wheezing  GI:  No pain, nausea, vomiting, diarrhea, constipatiion  :  No pain, bleeding, incontinence, nocturia  Muscle:  No pain, weakness  Breast:  No pain, abscess, mass, discharge  Neuro:  No weakness, tingling, memory problems  Psych:  No fatigue, insomnia, mood problems, depression  Endocrine:  No polyuria, polydypsia, cold/heat intolerance  Heme:  No petechiae, ecchymosis, easy bruisability  Skin:  No rash, tattoos, scars, edema      Vital Signs Last 24 Hrs  T(C): 36.2 (2018 11:35), Max: 36.7 (2018 17:30)  T(F): 97.1 (2018 11:35), Max: 98.1 (2018 17:30)  HR: 74 (2018 11:35) (70 - 89)  BP: 149/84 (2018 11:35) (149/84 - 172/110)  BP(mean): --  RR: 16 (2018 11:35) (16 - 17)  SpO2: 100% (2018 11:35) (97% - 100%)    GENERAL PHYSICAL EXAM:  General:  Appears stated age, well-groomed, well-nourished, no distress  HEENT:  NC/AT, patent nares w/ pink mucosa, OP clear w/o lesions, PERRL, EOMI, conjunctivae clear, no thyromegaly, nodules, adenopathy, no JVD  Chest:  Full & symmetric excursion, no increased effort, breath sounds clear  Cardiovascular:  Regular rhythm, S1, S2, no murmur/rub/S3/S4, no carotid/femoral/abdominal bruit, radial/pedal pulses 2+, no edema  Abdomen:  Soft, non-tender, non-distended, normoactive bowel sounds, no HSM  Extremities:  Gait & station:   Digits:   Nails:   Joints, Bones, Muscles:   ROM:   Stability:  Skin:  No rash/erythema/ecchymoses/petechiae/wounds/abscess/warm/dry  Musculoskeletal:  Full ROM in all joints w/o swelling/tenderness/effusion    NEUROLOGICAL EXAM:  HENT:  Normocephalic head; atraumatic head.  Neck supple.  ENT: normal looking.  Mental State:    Alert.oriented to self   Coherent.  Speech clear and memory is impaired for short term .  Cooperative.  Responds appropriately.    Cranial Nerves:  II-XII:   Pupils round and reactive to light and accommodation.  Extraocular movements full.  Visual fields full (no homonymous hemianopsia).  Visual acuity wnl.  Facial symmetry intact.  Tongue midline.  Motor Functions:  Moves all extremities.  No pronator drift of UE.  Claps hand well.  Hand  intact bilaterally.  Ambulatory.    Sensory Functions:   Intact to touch and pinprick to face and extremities.    Reflexes:  Deep tendon reflexes normoactive to biceps, knees and ankles.  Babinski absent (present).  Cerebellar Testing:    Finger to nose intact.  Nystagmus absent.  Gait: unremarkable     LABS:                        15.0   4.67  )-----------( 337      ( 2018 07:50 )             44.9     07-11    141  |  104  |  20  ----------------------------<  82  3.1<L>   |  27  |  1.10    Ca    9.2      2018 07:50    TPro  8.6<H>  /  Alb  3.8  /  TBili  0.6  /  DBili  x   /  AST  27  /  ALT  33  /  AlkPhos  104  07-10    PT/INR - ( 10 Jul 2018 13:49 )   PT: 11.7 sec;   INR: 1.07 ratio         PTT - ( 10 Jul 2018 13:49 )  PTT:38.3 sec    Urinalysis Basic - ( 10 Jul 2018 20:58 )    Color: Yellow / Appearance: Clear / S.010 / pH: x  Gluc: x / Ketone: Negative  / Bili: Negative / Urobili: Negative mg/dL   Blood: x / Protein: 15 mg/dL / Nitrite: Negative   Leuk Esterase: Negative / RBC: 0-2 /HPF / WBC 0-2   Sq Epi: x / Non Sq Epi: Occasional / Bacteria: Occasional        RADIOLOGY & ADDITIONAL STUDIES:    Rapid Plasma Reagin Assay: 07:35 ( @ 13:36)  RPR Titer: 07:35 ( @ 00:09)      Assessment & Opinion:63 y o man seen for evaluation because of confusion is found to have a sub-therapeutic level of cyanocobalamin causing a nutritional encephalopathy     DX Encephalopathy    Recommendations:    EEG.   DVT prophylaxis as ordered.  Medications:  VIT B-12 INJECTION

## 2018-07-13 RX ADMIN — Medication 81 MILLIGRAM(S): at 12:06

## 2018-07-13 RX ADMIN — PREGABALIN 1000 MICROGRAM(S): 225 CAPSULE ORAL at 12:06

## 2018-07-13 RX ADMIN — Medication 50 MILLIGRAM(S): at 06:09

## 2018-07-13 RX ADMIN — Medication 50 MILLIGRAM(S): at 19:22

## 2018-07-13 RX ADMIN — ENOXAPARIN SODIUM 40 MILLIGRAM(S): 100 INJECTION SUBCUTANEOUS at 21:46

## 2018-07-13 RX ADMIN — AMLODIPINE BESYLATE 10 MILLIGRAM(S): 2.5 TABLET ORAL at 06:09

## 2018-07-13 RX ADMIN — SODIUM CHLORIDE 70 MILLILITER(S): 9 INJECTION, SOLUTION INTRAVENOUS at 07:58

## 2018-07-13 NOTE — PROGRESS NOTE ADULT - SUBJECTIVE AND OBJECTIVE BOX
Patient is a 63y old  Male who presents with a chief complaint of change in mental status (10 Jul 2018 15:20)      INTERVAL HPI/OVERNIGHT EVENTS:  pt is awake and alert  MEDICATIONS  (STANDING):  amLODIPine   Tablet 10 milliGRAM(s) Oral daily  aspirin enteric coated 81 milliGRAM(s) Oral daily  cyanocobalamin Injectable 1000 MICROGram(s) IntraMuscular daily  enoxaparin Injectable 40 milliGRAM(s) SubCutaneous every 24 hours  metoprolol tartrate 50 milliGRAM(s) Oral two times a day  sodium chloride 0.45%. 1000 milliLiter(s) (70 mL/Hr) IV Continuous <Continuous>    MEDICATIONS  (PRN):      Allergies    No Known Allergies    Intolerances        REVIEW OF SYSTEMS:  CONSTITUTIONAL: No fever, weight loss, or fatigue  EYES: No eye pain, visual disturbances, or discharge  ENMT:  No difficulty hearing, tinnitus, vertigo; No sinus or throat pain  NECK: No pain or stiffness  BREASTS: No pain, masses, or nipple discharge  RESPIRATORY: No cough, wheezing, chills or hemoptysis; No shortness of breath  CARDIOVASCULAR: No chest pain, palpitations, dizziness, or leg swelling  GASTROINTESTINAL: No abdominal or epigastric pain. No nausea, vomiting, or hematemesis; No diarrhea or constipation. No melena or hematochezia.  GENITOURINARY: No dysuria, frequency, hematuria, or incontinence  NEUROLOGICAL: No headaches, memory loss, loss of strength, numbness, or tremors  SKIN: No itching, burning, rashes, or lesions   LYMPH NODES: No enlarged glands  ENDOCRINE: No heat or cold intolerance; No hair loss  MUSCULOSKELETAL: No joint pain or swelling; No muscle, back, or extremity pain  PSYCHIATRIC: No depression, anxiety, mood swings, or difficulty sleeping  HEME/LYMPH: No easy bruising, or bleeding gums  ALLERGY AND IMMUNOLOGIC: No hives or eczema    Vital Signs Last 24 Hrs  T(C): 36.7 (13 Jul 2018 10:24), Max: 36.9 (12 Jul 2018 16:54)  T(F): 98 (13 Jul 2018 10:24), Max: 98.4 (12 Jul 2018 16:54)  HR: 82 (13 Jul 2018 10:24) (73 - 82)  BP: 160/95 (13 Jul 2018 10:24) (146/93 - 173/105)  BP(mean): --  RR: 16 (13 Jul 2018 10:24) (16 - 17)  SpO2: 95% (13 Jul 2018 10:24) (95% - 99%)    PHYSICAL EXAM:  GENERAL: NAD, well-groomed, well-developed  HEAD:  Atraumatic, Normocephalic  EYES: EOMI, PERRLA, conjunctiva and sclera clear  ENMT: No tonsillar erythema, exudates, or enlargement; Moist mucous membranes, Good dentition, No lesions  NECK: Supple, No JVD, Normal thyroid  NERVOUS SYSTEM:  Alert & Oriented X3, Good concentration; Motor Strength 5/5 B/L upper and lower extremities; DTRs 2+ intact and symmetric  CHEST/LUNG: Clear to percussion bilaterally; No rales, rhonchi, wheezing, or rubs  HEART: Regular rate and rhythm; No murmurs, rubs, or gallops  ABDOMEN: Soft, Nontender, Nondistended; Bowel sounds present  EXTREMITIES:  2+ Peripheral Pulses, No clubbing, cyanosis, or edema  LYMPH: No lymphadenopathy noted  SKIN: No rashes or lesions    LABS:              CAPILLARY BLOOD GLUCOSE        CULTURES:  Culture Results:   No growth (07-10 @ 23:03)    HEMOGLOBIN A1C:    CHOLESTEROL:  Cholesterol, Serum: 186 mg/dL (07-11-18 @ 09:57)  HDL Cholesterol, Serum: 41 mg/dL (07-11-18 @ 09:57)  Triglycerides, Serum: 63 mg/dL (07-11-18 @ 09:57)        RADIOLOGY & ADDITIONAL TESTS:

## 2018-07-13 NOTE — PROGRESS NOTE ADULT - SUBJECTIVE AND OBJECTIVE BOX
INTERVAL HPI:   64 yo male with history of abnormal cxr( was taken for immigration purpose in [place] per sister).  [place]rived in [place] on June 5th with Immigrant Visa.  presented  with confusion As per sister patient seems confused and nonverbal ,  responsive at times (10 Jul 2018 15:20)  Pt confused, not able to provide history. Spoke with sister at bedside. According to her no sob, cough, fever, night sweat or weight loss.  seen for suspected TB as CXR reported abnormal before coming to [place].    OVERNIGHT EVENTS:  No new complains.    Vital Signs Last 24 Hrs  T(C): 36.7 (13 Jul 2018 10:24), Max: 36.9 (12 Jul 2018 16:54)  T(F): 98 (13 Jul 2018 10:24), Max: 98.4 (12 Jul 2018 16:54)  HR: 82 (13 Jul 2018 10:24) (73 - 82)  BP: 160/95 (13 Jul 2018 10:24) (146/93 - 173/105)  BP(mean): --  RR: 16 (13 Jul 2018 10:24) (16 - 17)  SpO2: 95% (13 Jul 2018 10:24) (95% - 99%)    PHYSICAL EXAM:  GEN:         Awake, responsive and comfortable.  HEENT:     Normal.    RESP:         no distress.  CVS:             Regular rate and rhythm.   ABD:         Soft, non-tender, non-distended;     MEDICATIONS  (STANDING):  amLODIPine   Tablet 10 milliGRAM(s) Oral daily  aspirin enteric coated 81 milliGRAM(s) Oral daily  cyanocobalamin Injectable 1000 MICROGram(s) IntraMuscular daily  enoxaparin Injectable 40 milliGRAM(s) SubCutaneous every 24 hours  metoprolol tartrate 50 milliGRAM(s) Oral two times a day  sodium chloride 0.45%. 1000 milliLiter(s) (70 mL/Hr) IV Continuous <Continuous>    07-11 @ 00:30  pH: 7.44  pCO2: 44  pO2: 90  SaO2: 97  ASSESSMENT AND PLAN:  ·	Altered mental status.  ·	Right occipital  infarct.  ·	Abnormal Chest X Ray.  ·	HTN  ·	Dehydration.    Pulmonary status stable.

## 2018-07-13 NOTE — PROGRESS NOTE ADULT - SUBJECTIVE AND OBJECTIVE BOX
Subjective Complaints:  Historian:  63 y o admitted for confusion seen on neuro consult patient is very confused.       REVIEW OF SYSTEMS:  Eyes:  Good vision, no reported pain  ENT:  No sore throat, pain, runny nose, dysphagia  CV:  No pain, palpitatioins, hypo/hypertension  Resp:  No dyspnea, cough, tachypnea, wheezing  GI:  No pain, nausea, vomiting, diarrhea, constipatiion  Muscle:  No pain, weakness  Neuro:  No weakness, tingling, memory problems  Psych:  No fatigue, insomnia, mood problems, depression  Endocrine:  No polyuria, polydypsia, cold/heat intolerance    Vital Signs Last 24 Hrs  T(C): 36.7 (13 Jul 2018 10:24), Max: 36.9 (12 Jul 2018 16:54)  T(F): 98 (13 Jul 2018 10:24), Max: 98.4 (12 Jul 2018 16:54)  HR: 82 (13 Jul 2018 10:24) (73 - 82)  BP: 160/95 (13 Jul 2018 10:24) (146/93 - 173/105)  BP(mean): --  RR: 16 (13 Jul 2018 10:24) (16 - 17)  SpO2: 95% (13 Jul 2018 10:24) (95% - 100%)    GENERAL PHYSICAL EXAM:  General:  Appears stated age, well-groomed, well-nourished, no distress  HEENT:  NC/AT, patent nares w/ pink mucosa, OP clear w/o lesions, PERRL, EOMI, conjunctivae clear, no thyromegaly, nodules, adenopathy, no JVD  Chest:  Full & symmetric excursion, no increased effort, breath sounds clear  Cardiovascular:  Regular rhythm, S1, S2, no murmur/rub/S3/S4, no carotid/femoral/abdominal bruit, radial/pedal pulses 2+, no edema  Abdomen:  Soft, non-tender, non-distended, normoactive bowel sounds, no HSM  Extremities:  Gait & station:   Digits:   Nails:   Joints, Bones, Muscles:   ROM:   Stability:  Skin:  No rash/erythema/ecchymoses/petechiae/wounds/abscess/warm/dry  Musculoskeletal:  Full ROM in all joints w/o swelling/tenderness/effusion    NEUROLOGICAL EXAM:  HENT:  Normocephalic head; atraumatic head.  Neck supple.  ENT: normal looking.  Mental State:    Awake   oriented to person, only   Speech clear and intact.  Cooperative. memory is impaired for long and short terms . no hallucination   Cranial Nerves:  II-XII:   Pupils round and reactive to light and accommodation.  Extraocular movements full.  Visual fields full (no homonymous hemianopsia).  Visual acuity wnl.  Facial symmetry intact.  Tongue midline.  Motor Functions:  Moves all extremities with a strength of 3/5     Sensory Functions:   Intact to touch and pinprick to face and extremities.    Reflexes:  Deep tendon reflexes normoactive to biceps, knees and ankles. plantar responses are flexor   Cerebellar Testing:    Finger to nose intact.  Nystagmus absent.  Gait : hesitant     LABS:                  RADIOLOGY & ADDITIONAL STUDIES:    cyanocobalamin Injectable:   1000 MICROGram(s), IntraMuscular, daily  Provider's Contact #: [phone] (07-12 @ 11:59)  Angiotensin Converting Enzyme, Serum: Routine (07-12 @ 18:23)  Quantiferon - Gold Tuberculosis: Routine (07-12 @ 18:23)      Recommendations: Awaiting RPR result  EEG.   DVT prophylaxis as ordered.  Medications:  Continue B-12 injection

## 2018-07-14 LAB
ANION GAP SERPL CALC-SCNC: 5 MMOL/L — SIGNIFICANT CHANGE UP (ref 5–17)
BUN SERPL-MCNC: 19 MG/DL — SIGNIFICANT CHANGE UP (ref 7–23)
CALCIUM SERPL-MCNC: 9.2 MG/DL — SIGNIFICANT CHANGE UP (ref 8.5–10.1)
CHLORIDE SERPL-SCNC: 105 MMOL/L — SIGNIFICANT CHANGE UP (ref 96–108)
CO2 SERPL-SCNC: 30 MMOL/L — SIGNIFICANT CHANGE UP (ref 22–31)
CREAT SERPL-MCNC: 1.04 MG/DL — SIGNIFICANT CHANGE UP (ref 0.5–1.3)
GAMMA INTERFERON BACKGROUND BLD IA-ACNC: 0.12 IU/ML — SIGNIFICANT CHANGE UP
GLUCOSE SERPL-MCNC: 82 MG/DL — SIGNIFICANT CHANGE UP (ref 70–99)
HCT VFR BLD CALC: 43.1 % — SIGNIFICANT CHANGE UP (ref 39–50)
HGB BLD-MCNC: 14.2 G/DL — SIGNIFICANT CHANGE UP (ref 13–17)
M TB IFN-G BLD-IMP: POSITIVE
M TB IFN-G CD4+ BCKGRND COR BLD-ACNC: 5.72 IU/ML — SIGNIFICANT CHANGE UP
M TB IFN-G CD4+CD8+ BCKGRND COR BLD-ACNC: 7.4 IU/ML — SIGNIFICANT CHANGE UP
MCHC RBC-ENTMCNC: 29.2 PG — SIGNIFICANT CHANGE UP (ref 27–34)
MCHC RBC-ENTMCNC: 32.9 GM/DL — SIGNIFICANT CHANGE UP (ref 32–36)
MCV RBC AUTO: 88.5 FL — SIGNIFICANT CHANGE UP (ref 80–100)
NIGHT BLUE STAIN TISS: SIGNIFICANT CHANGE UP
NRBC # BLD: 0 /100 WBCS — SIGNIFICANT CHANGE UP (ref 0–0)
PLATELET # BLD AUTO: 333 K/UL — SIGNIFICANT CHANGE UP (ref 150–400)
POTASSIUM SERPL-MCNC: 3.8 MMOL/L — SIGNIFICANT CHANGE UP (ref 3.5–5.3)
POTASSIUM SERPL-SCNC: 3.8 MMOL/L — SIGNIFICANT CHANGE UP (ref 3.5–5.3)
QUANT TB PLUS MITOGEN MINUS NIL: >10 IU/ML — SIGNIFICANT CHANGE UP
RBC # BLD: 4.87 M/UL — SIGNIFICANT CHANGE UP (ref 4.2–5.8)
RBC # FLD: 13.7 % — SIGNIFICANT CHANGE UP (ref 10.3–14.5)
SODIUM SERPL-SCNC: 140 MMOL/L — SIGNIFICANT CHANGE UP (ref 135–145)
SPECIMEN SOURCE: SIGNIFICANT CHANGE UP
WBC # BLD: 4.5 K/UL — SIGNIFICANT CHANGE UP (ref 3.8–10.5)
WBC # FLD AUTO: 4.5 K/UL — SIGNIFICANT CHANGE UP (ref 3.8–10.5)

## 2018-07-14 RX ADMIN — Medication 50 MILLIGRAM(S): at 05:54

## 2018-07-14 RX ADMIN — SODIUM CHLORIDE 70 MILLILITER(S): 9 INJECTION, SOLUTION INTRAVENOUS at 05:56

## 2018-07-14 RX ADMIN — Medication 50 MILLIGRAM(S): at 17:12

## 2018-07-14 RX ADMIN — Medication 81 MILLIGRAM(S): at 11:30

## 2018-07-14 RX ADMIN — PREGABALIN 1000 MICROGRAM(S): 225 CAPSULE ORAL at 11:30

## 2018-07-14 RX ADMIN — AMLODIPINE BESYLATE 10 MILLIGRAM(S): 2.5 TABLET ORAL at 05:54

## 2018-07-14 RX ADMIN — ENOXAPARIN SODIUM 40 MILLIGRAM(S): 100 INJECTION SUBCUTANEOUS at 21:29

## 2018-07-14 NOTE — PROGRESS NOTE ADULT - SUBJECTIVE AND OBJECTIVE BOX
Patient is a 63y old  Male who presents with a chief complaint of change in mental status (10 Jul 2018 15:20)      INTERVAL HPI/OVERNIGHT EVENTS:  pt is awake and alert  MEDICATIONS  (STANDING):  amLODIPine   Tablet 10 milliGRAM(s) Oral daily  aspirin enteric coated 81 milliGRAM(s) Oral daily  cyanocobalamin Injectable 1000 MICROGram(s) IntraMuscular daily  enoxaparin Injectable 40 milliGRAM(s) SubCutaneous every 24 hours  metoprolol tartrate 50 milliGRAM(s) Oral two times a day  sodium chloride 0.45%. 1000 milliLiter(s) (70 mL/Hr) IV Continuous <Continuous>    MEDICATIONS  (PRN):      Allergies    No Known Allergies    Intolerances        REVIEW OF SYSTEMS:  CONSTITUTIONAL: No fever, weight loss, or fatigue  EYES: No eye pain, visual disturbances, or discharge  ENMT:  No difficulty hearing, tinnitus, vertigo; No sinus or throat pain  NECK: No pain or stiffness  BREASTS: No pain, masses, or nipple discharge  RESPIRATORY: No cough, wheezing, chills or hemoptysis; No shortness of breath  CARDIOVASCULAR: No chest pain, palpitations, dizziness, or leg swelling  GASTROINTESTINAL: No abdominal or epigastric pain. No nausea, vomiting, or hematemesis; No diarrhea or constipation. No melena or hematochezia.  GENITOURINARY: No dysuria, frequency, hematuria, or incontinence  NEUROLOGICAL: No headaches, memory loss, loss of strength, numbness, or tremors  SKIN: No itching, burning, rashes, or lesions   LYMPH NODES: No enlarged glands  ENDOCRINE: No heat or cold intolerance; No hair loss  MUSCULOSKELETAL: No joint pain or swelling; No muscle, back, or extremity pain  PSYCHIATRIC: No depression, anxiety, mood swings, or difficulty sleeping  HEME/LYMPH: No easy bruising, or bleeding gums  ALLERGY AND IMMUNOLOGIC: No hives or eczema    Vital Signs Last 24 Hrs  T(C): 36.2 (14 Jul 2018 05:37), Max: 37.1 (13 Jul 2018 17:28)  T(F): 97.2 (14 Jul 2018 05:37), Max: 98.8 (13 Jul 2018 17:28)  HR: 82 (14 Jul 2018 05:37) (73 - 82)  BP: 155/92 (14 Jul 2018 05:37) (147/95 - 167/104)  BP(mean): --  RR: 17 (14 Jul 2018 05:37) (16 - 17)  SpO2: 98% (14 Jul 2018 05:37) (97% - 98%)    PHYSICAL EXAM:  GENERAL: NAD, well-groomed, well-developed  HEAD:  Atraumatic, Normocephalic  EYES: EOMI, PERRLA, conjunctiva and sclera clear  ENMT: No tonsillar erythema, exudates, or enlargement; Moist mucous membranes, Good dentition, No lesions  NECK: Supple, No JVD, Normal thyroid  NERVOUS SYSTEM:  Alert & Oriented X3, Good concentration; Motor Strength 5/5 B/L upper and lower extremities; DTRs 2+ intact and symmetric  CHEST/LUNG: Clear to percussion bilaterally; No rales, rhonchi, wheezing, or rubs  HEART: Regular rate and rhythm; No murmurs, rubs, or gallops  ABDOMEN: Soft, Nontender, Nondistended; Bowel sounds present  EXTREMITIES:  2+ Peripheral Pulses, No clubbing, cyanosis, or edema  LYMPH: No lymphadenopathy noted  SKIN: No rashes or lesions    LABS:                        14.2   4.50  )-----------( 333      ( 14 Jul 2018 07:50 )             43.1     07-14    140  |  105  |  19  ----------------------------<  82  3.8   |  30  |  1.04    Ca    9.2      14 Jul 2018 07:50          CAPILLARY BLOOD GLUCOSE        CULTURES:    HEMOGLOBIN A1C:    CHOLESTEROL:        RADIOLOGY & ADDITIONAL TESTS:

## 2018-07-14 NOTE — PROGRESS NOTE ADULT - SUBJECTIVE AND OBJECTIVE BOX
INTERVAL HPI:  64 yo male with history of abnormal cxr( was taken for immigration purpose in [place] per sister).  Arrived in [place] on June 5th with Immigrant Visa.  presented  with confusion As per sister patient seems confused and nonverbal ,  responsive at times (10 Jul 2018 15:20)  Pt confused, not able to provide history. Spoke with sister at bedside. According to her no sob, cough, fever, night sweat or weight loss.  seen for suspected TB as CXR reported abnormal before coming to [place].    OVERNIGHT EVENTS:  Comfortable, no new complains.    Vital Signs Last 24 Hrs  T(C): 36.3 (14 Jul 2018 11:38), Max: 37.1 (13 Jul 2018 17:28)  T(F): 97.4 (14 Jul 2018 11:38), Max: 98.8 (13 Jul 2018 17:28)  HR: 81 (14 Jul 2018 11:38) (73 - 82)  BP: 152/84 (14 Jul 2018 11:38) (147/95 - 167/104)  BP(mean): --  RR: 16 (14 Jul 2018 11:38) (16 - 17)  SpO2: 98% (14 Jul 2018 11:38) (97% - 98%)    PHYSICAL EXAM:  GEN:         Awake, responsive and comfortable.  HEENT:    Normal.    RESP:       no wheezing.  CVS:         Regular rate and rhythm.   ABD:         Soft, non-tender, non-distended;     MEDICATIONS  (STANDING):  amLODIPine   Tablet 10 milliGRAM(s) Oral daily  aspirin enteric coated 81 milliGRAM(s) Oral daily  cyanocobalamin Injectable 1000 MICROGram(s) IntraMuscular daily  enoxaparin Injectable 40 milliGRAM(s) SubCutaneous every 24 hours  metoprolol tartrate 50 milliGRAM(s) Oral two times a day  sodium chloride 0.45%. 1000 milliLiter(s) (70 mL/Hr) IV Continuous <Continuous>    LABS:                        14.2   4.50  )-----------( 333      ( 14 Jul 2018 07:50 )             43.1     07-14    140  |  105  |  19  ----------------------------<  82  3.8   |  30  |  1.04    Ca    9.2      14 Jul 2018 07:50    07-11 @ 00:30  pH: 7.44  pCO2: 44  pO2: 90  SaO2: 97    ASSESSMENT AND PLAN:  ·	Altered mental status.  ·	Right occipital  infarct.  ·	Abnormal Chest X Ray.  ·	HTN  ·	Dehydration.    Awaiting 3rd sputum AFB.

## 2018-07-14 NOTE — PROGRESS NOTE ADULT - ASSESSMENT
[place] pulmo TB  esr is so low; no fevers ; this is not consistent with active TB ;; need CT chest   Pending CT chest   HIV pending   sputum AFB pending , need 3 sets   continue isolation   no fevers , no cough   Await ct chest before starting antibiotics   Quanteferon test is positive   o is rpr   dementia work up in progress  PATIENT NEEDS SPINAL TAP PLEASE

## 2018-07-15 RX ADMIN — Medication 81 MILLIGRAM(S): at 11:22

## 2018-07-15 RX ADMIN — AMLODIPINE BESYLATE 10 MILLIGRAM(S): 2.5 TABLET ORAL at 06:03

## 2018-07-15 RX ADMIN — SODIUM CHLORIDE 70 MILLILITER(S): 9 INJECTION, SOLUTION INTRAVENOUS at 06:06

## 2018-07-15 RX ADMIN — PREGABALIN 1000 MICROGRAM(S): 225 CAPSULE ORAL at 11:22

## 2018-07-15 RX ADMIN — ENOXAPARIN SODIUM 40 MILLIGRAM(S): 100 INJECTION SUBCUTANEOUS at 21:30

## 2018-07-15 RX ADMIN — Medication 50 MILLIGRAM(S): at 17:15

## 2018-07-15 RX ADMIN — Medication 50 MILLIGRAM(S): at 06:03

## 2018-07-15 NOTE — PROGRESS NOTE ADULT - SUBJECTIVE AND OBJECTIVE BOX
Patient is a 63y old  Male who presents with a chief complaint of change in mental status (10 Jul 2018 15:20)      INTERVAL HPI/OVERNIGHT EVENTS:  pt is doing better  MEDICATIONS  (STANDING):  amLODIPine   Tablet 10 milliGRAM(s) Oral daily  aspirin enteric coated 81 milliGRAM(s) Oral daily  cyanocobalamin Injectable 1000 MICROGram(s) IntraMuscular daily  enoxaparin Injectable 40 milliGRAM(s) SubCutaneous every 24 hours  metoprolol tartrate 50 milliGRAM(s) Oral two times a day  sodium chloride 0.45%. 1000 milliLiter(s) (70 mL/Hr) IV Continuous <Continuous>    MEDICATIONS  (PRN):      Allergies    No Known Allergies    Intolerances        REVIEW OF SYSTEMS:  CONSTITUTIONAL: No fever, weight loss, or fatigue  EYES: No eye pain, visual disturbances, or discharge  ENMT:  No difficulty hearing, tinnitus, vertigo; No sinus or throat pain  NECK: No pain or stiffness  BREASTS: No pain, masses, or nipple discharge  RESPIRATORY: No cough, wheezing, chills or hemoptysis; No shortness of breath  CARDIOVASCULAR: No chest pain, palpitations, dizziness, or leg swelling  GASTROINTESTINAL: No abdominal or epigastric pain. No nausea, vomiting, or hematemesis; No diarrhea or constipation. No melena or hematochezia.  GENITOURINARY: No dysuria, frequency, hematuria, or incontinence  NEUROLOGICAL: No headaches, memory loss, loss of strength, numbness, or tremors  SKIN: No itching, burning, rashes, or lesions   LYMPH NODES: No enlarged glands  ENDOCRINE: No heat or cold intolerance; No hair loss  MUSCULOSKELETAL: No joint pain or swelling; No muscle, back, or extremity pain  PSYCHIATRIC: No depression, anxiety, mood swings, or difficulty sleeping  HEME/LYMPH: No easy bruising, or bleeding gums  ALLERGY AND IMMUNOLOGIC: No hives or eczema    Vital Signs Last 24 Hrs  T(C): 36.3 (15 Jul 2018 12:14), Max: 36.5 (14 Jul 2018 16:52)  T(F): 97.4 (15 Jul 2018 12:14), Max: 97.7 (14 Jul 2018 16:52)  HR: 74 (15 Jul 2018 12:14) (68 - 74)  BP: 146/91 (15 Jul 2018 12:14) (136/88 - 155/87)  BP(mean): --  RR: 18 (15 Jul 2018 12:14) (17 - 18)  SpO2: 99% (15 Jul 2018 12:14) (93% - 99%)    PHYSICAL EXAM:  GENERAL: NAD, well-groomed, well-developed  HEAD:  Atraumatic, Normocephalic  EYES: EOMI, PERRLA, conjunctiva and sclera clear  ENMT: No tonsillar erythema, exudates, or enlargement; Moist mucous membranes, Good dentition, No lesions  NECK: Supple, No JVD, Normal thyroid  NERVOUS SYSTEM:  Alert & Oriented X3, Good concentration; Motor Strength 5/5 B/L upper and lower extremities; DTRs 2+ intact and symmetric  CHEST/LUNG: Clear to percussion bilaterally; No rales, rhonchi, wheezing, or rubs  HEART: Regular rate and rhythm; No murmurs, rubs, or gallops  ABDOMEN: Soft, Nontender, Nondistended; Bowel sounds present  EXTREMITIES:  2+ Peripheral Pulses, No clubbing, cyanosis, or edema  LYMPH: No lymphadenopathy noted  SKIN: No rashes or lesions    LABS:                        14.2   4.50  )-----------( 333      ( 14 Jul 2018 07:50 )             43.1     07-14    140  |  105  |  19  ----------------------------<  82  3.8   |  30  |  1.04    Ca    9.2      14 Jul 2018 07:50          CAPILLARY BLOOD GLUCOSE        CULTURES:    HEMOGLOBIN A1C:    CHOLESTEROL:        RADIOLOGY & ADDITIONAL TESTS:

## 2018-07-16 DIAGNOSIS — F03.90 UNSPECIFIED DEMENTIA WITHOUT BEHAVIORAL DISTURBANCE: ICD-10-CM

## 2018-07-16 LAB
ACE SERPL-CCNC: 81 U/L — SIGNIFICANT CHANGE UP (ref 14–82)
ANION GAP SERPL CALC-SCNC: 10 MMOL/L — SIGNIFICANT CHANGE UP (ref 5–17)
BUN SERPL-MCNC: 17 MG/DL — SIGNIFICANT CHANGE UP (ref 7–23)
CALCIUM SERPL-MCNC: 9.1 MG/DL — SIGNIFICANT CHANGE UP (ref 8.5–10.1)
CHLORIDE SERPL-SCNC: 104 MMOL/L — SIGNIFICANT CHANGE UP (ref 96–108)
CO2 SERPL-SCNC: 26 MMOL/L — SIGNIFICANT CHANGE UP (ref 22–31)
CREAT SERPL-MCNC: 1.13 MG/DL — SIGNIFICANT CHANGE UP (ref 0.5–1.3)
GLUCOSE SERPL-MCNC: 92 MG/DL — SIGNIFICANT CHANGE UP (ref 70–99)
HCT VFR BLD CALC: 43.1 % — SIGNIFICANT CHANGE UP (ref 39–50)
HGB BLD-MCNC: 14.3 G/DL — SIGNIFICANT CHANGE UP (ref 13–17)
MCHC RBC-ENTMCNC: 29.3 PG — SIGNIFICANT CHANGE UP (ref 27–34)
MCHC RBC-ENTMCNC: 33.2 GM/DL — SIGNIFICANT CHANGE UP (ref 32–36)
MCV RBC AUTO: 88.3 FL — SIGNIFICANT CHANGE UP (ref 80–100)
NRBC # BLD: 0 /100 WBCS — SIGNIFICANT CHANGE UP (ref 0–0)
PLATELET # BLD AUTO: 324 K/UL — SIGNIFICANT CHANGE UP (ref 150–400)
POTASSIUM SERPL-MCNC: 3.7 MMOL/L — SIGNIFICANT CHANGE UP (ref 3.5–5.3)
POTASSIUM SERPL-SCNC: 3.7 MMOL/L — SIGNIFICANT CHANGE UP (ref 3.5–5.3)
RBC # BLD: 4.88 M/UL — SIGNIFICANT CHANGE UP (ref 4.2–5.8)
RBC # FLD: 13.4 % — SIGNIFICANT CHANGE UP (ref 10.3–14.5)
SODIUM SERPL-SCNC: 140 MMOL/L — SIGNIFICANT CHANGE UP (ref 135–145)
WBC # BLD: 4.47 K/UL — SIGNIFICANT CHANGE UP (ref 3.8–10.5)
WBC # FLD AUTO: 4.47 K/UL — SIGNIFICANT CHANGE UP (ref 3.8–10.5)

## 2018-07-16 PROCEDURE — 71250 CT THORAX DX C-: CPT | Mod: 26

## 2018-07-16 PROCEDURE — 93880 EXTRACRANIAL BILAT STUDY: CPT | Mod: 26

## 2018-07-16 PROCEDURE — 70551 MRI BRAIN STEM W/O DYE: CPT | Mod: 26

## 2018-07-16 PROCEDURE — 93306 TTE W/DOPPLER COMPLETE: CPT | Mod: 26

## 2018-07-16 RX ADMIN — Medication 50 MILLIGRAM(S): at 18:00

## 2018-07-16 RX ADMIN — AMLODIPINE BESYLATE 10 MILLIGRAM(S): 2.5 TABLET ORAL at 05:43

## 2018-07-16 RX ADMIN — ENOXAPARIN SODIUM 40 MILLIGRAM(S): 100 INJECTION SUBCUTANEOUS at 21:56

## 2018-07-16 RX ADMIN — Medication 50 MILLIGRAM(S): at 05:43

## 2018-07-16 RX ADMIN — Medication 81 MILLIGRAM(S): at 12:04

## 2018-07-16 RX ADMIN — PREGABALIN 1000 MICROGRAM(S): 225 CAPSULE ORAL at 12:04

## 2018-07-16 NOTE — PHYSICAL THERAPY INITIAL EVALUATION ADULT - ADDITIONAL COMMENTS
Pt has 5 steps c sung rails to get into house and no stairs in the house.
Pt has 5 steps c sung rails to get into house and no stairs in the house.

## 2018-07-16 NOTE — PHYSICAL THERAPY INITIAL EVALUATION ADULT - GENERAL OBSERVATIONS, REHAB EVAL
Pt is off airborne precaution. Pt was seen in supine, alert and oriented to name, time, and place.  Pt refused Cyracom but was able to follow simple instruction throughout P.T. intervention. Pt has difficulty in finding words sometimes but was able to communicate with staff and express his needs.

## 2018-07-16 NOTE — PROGRESS NOTE ADULT - ASSESSMENT
Asymptomatic [place] r/o pulmo TB  esr is so low; no fevers ; this is not consistent with active TB ;; need CT chest   Pending CT chest   HIV pending   sputum AFB pending , need 3 sets   continue isolation   no fevers , no cough   Await ct chest before starting antibiotics   Quanteferon test is positive   o is rpr   dementia work up in progress  PATIENT NEEDS SPINAL TAP PLEASE Asymptomatic ro infiltrate [place] r/o pulmo TB  esr is so low; no fevers ; this is not consistent with active TB ;;   CT chest noted  HIV NEGATIVE   sputum AFB pending x 3 all NEGATIVE   continue isolation   no fevers , no cough    Quanteferon test is positive   so is rpr   dementia work up in progress  low b12  PATIENT NEEDS SPINAL TAP PLEASE;; neuro follow up

## 2018-07-16 NOTE — PHYSICAL THERAPY INITIAL EVALUATION ADULT - PREDICTED DURATION OF THERAPY (DAYS/WKS), PT EVAL
Pt is I c good balance and endurance in all functional ability, therefore d/c pt from the PT program.  If any further needs arise, please reconsult.
Pt is I c good balance and endurance in all functional ability, therefore d/c pt from the PT program.  If any further needs arise, please reconsult.

## 2018-07-16 NOTE — PROGRESS NOTE ADULT - SUBJECTIVE AND OBJECTIVE BOX
Patient is a 63y old  Male who presents with a chief complaint of change in mental status (10 Jul 2018 15:20)      INTERVAL HPI/OVERNIGHT EVENTS:  pt is doing better  MEDICATIONS  (STANDING):  amLODIPine   Tablet 10 milliGRAM(s) Oral daily  aspirin enteric coated 81 milliGRAM(s) Oral daily  cyanocobalamin Injectable 1000 MICROGram(s) IntraMuscular daily  enoxaparin Injectable 40 milliGRAM(s) SubCutaneous every 24 hours  metoprolol tartrate 50 milliGRAM(s) Oral two times a day    MEDICATIONS  (PRN):      Allergies    No Known Allergies    Intolerances        REVIEW OF SYSTEMS:  CONSTITUTIONAL: No fever, weight loss, or fatigue  EYES: No eye pain, visual disturbances, or discharge  ENMT:  No difficulty hearing, tinnitus, vertigo; No sinus or throat pain  NECK: No pain or stiffness  BREASTS: No pain, masses, or nipple discharge  RESPIRATORY: No cough, wheezing, chills or hemoptysis; No shortness of breath  CARDIOVASCULAR: No chest pain, palpitations, dizziness, or leg swelling  GASTROINTESTINAL: No abdominal or epigastric pain. No nausea, vomiting, or hematemesis; No diarrhea or constipation. No melena or hematochezia.  GENITOURINARY: No dysuria, frequency, hematuria, or incontinence  NEUROLOGICAL: No headaches, memory loss, loss of strength, numbness, or tremors  SKIN: No itching, burning, rashes, or lesions   LYMPH NODES: No enlarged glands  ENDOCRINE: No heat or cold intolerance; No hair loss  MUSCULOSKELETAL: No joint pain or swelling; No muscle, back, or extremity pain  PSYCHIATRIC: No depression, anxiety, mood swings, or difficulty sleeping  HEME/LYMPH: No easy bruising, or bleeding gums  ALLERGY AND IMMUNOLOGIC: No hives or eczema    Vital Signs Last 24 Hrs  T(C): 36.4 (16 Jul 2018 05:31), Max: 36.8 (15 Jul 2018 17:15)  T(F): 97.6 (16 Jul 2018 05:31), Max: 98.3 (15 Jul 2018 17:15)  HR: 76 (16 Jul 2018 05:31) (73 - 81)  BP: 150/95 (16 Jul 2018 05:31) (146/91 - 163/97)  BP(mean): --  RR: 18 (16 Jul 2018 05:31) (18 - 18)  SpO2: 98% (16 Jul 2018 05:31) (98% - 99%)    PHYSICAL EXAM:  GENERAL: NAD, well-groomed, well-developed  HEAD:  Atraumatic, Normocephalic  EYES: EOMI, PERRLA, conjunctiva and sclera clear  ENMT: No tonsillar erythema, exudates, or enlargement; Moist mucous membranes, Good dentition, No lesions  NECK: Supple, No JVD, Normal thyroid  NERVOUS SYSTEM:  Alert & Oriented X3, Good concentration; Motor Strength 5/5 B/L upper and lower extremities; DTRs 2+ intact and symmetric  CHEST/LUNG: Clear to percussion bilaterally; No rales, rhonchi, wheezing, or rubs  HEART: Regular rate and rhythm; No murmurs, rubs, or gallops  ABDOMEN: Soft, Nontender, Nondistended; Bowel sounds present  EXTREMITIES:  2+ Peripheral Pulses, No clubbing, cyanosis, or edema  LYMPH: No lymphadenopathy noted  SKIN: No rashes or lesions    LABS:                        14.3   4.47  )-----------( 324      ( 16 Jul 2018 07:33 )             43.1     07-16    140  |  104  |  17  ----------------------------<  92  3.7   |  26  |  1.13    Ca    9.1      16 Jul 2018 07:33          CAPILLARY BLOOD GLUCOSE        CULTURES:    HEMOGLOBIN A1C:    CHOLESTEROL:        RADIOLOGY & ADDITIONAL TESTS:

## 2018-07-16 NOTE — PROGRESS NOTE ADULT - SUBJECTIVE AND OBJECTIVE BOX
64 yo male with history of abnormal cxr today in office presents with confusion x coming to [facility] June 5 from [facility]. As per sister patient seems confused and verbal not responsive at times (10 Jul 2018 15:20)  hiv NEGATIVE   neuro work up noted  afb x 2 so far NEGATIVE     HPI:  64 yo male with history of abnormal cxr today in office presents with confusion x coming to [facility] June 5 from [facility]. As per sister patient seems confused and verbal not responsive at times (10 Jul 2018 15:20)      Allergies    No Known Allergies    Intolerances        MEDICATIONS  (STANDING):  amLODIPine   Tablet 10 milliGRAM(s) Oral daily  aspirin enteric coated 81 milliGRAM(s) Oral daily  cyanocobalamin Injectable 1000 MICROGram(s) IntraMuscular daily  enoxaparin Injectable 40 milliGRAM(s) SubCutaneous every 24 hours  metoprolol tartrate 50 milliGRAM(s) Oral two times a day    MEDICATIONS  (PRN):      REVIEW OF SYSTEMS:    CONSTITUTIONAL: No fever, chills, weight loss, or fatigue  HEENT: No sore throat, runny nose, ear ache  RESPIRATORY: No cough, wheezing, No shortness of breath  CARDIOVASCULAR: No chest pain, palpitations, dizziness  GASTROINTESTINAL: No abdominal pain. No nausea, vomiting, diarrhea  GENITOURINARY: No dysuria, increase frequency, hematuria, or incontinence  NEUROLOGICAL: No headaches, memory loss, loss of strength, numbness, or tremors, no weakness  EXTREMITY: No pedal edema BLE  SKIN: No itching, burning, rashes, or lesions     VITAL SIGNS:  T(C): 36.3 (07-16-18 @ 11:19), Max: 36.6 (07-15-18 @ 23:36)  T(F): 97.3 (07-16-18 @ 11:19), Max: 97.9 (07-15-18 @ 23:36)  HR: 86 (07-16-18 @ 17:58) (76 - 86)  BP: 147/91 (07-16-18 @ 17:58) (146/73 - 163/97)  RR: 16 (07-16-18 @ 14:06) (16 - 18)  SpO2: 98% (07-16-18 @ 14:06) (97% - 99%)  Wt(kg): --    PHYSICAL EXAM:    GENERAL: not in any distress  HEENT: Neck is supple, normocephalic, atraumatic   CHEST/LUNG: Clear to auscultation bilaterally; No rales, rhonchi, wheezing  HEART: Regular rate and rhythm; No murmurs, rubs, or gallops  ABDOMEN: Soft, Nontender, Nondistended; Bowel sounds present, no rebound   EXTREMITIES:  2+ Peripheral Pulses, No clubbing, cyanosis, or edema  GENITOURINARY:   SKIN: No rashes or lesions  BACK: no pressor sore   NERVOUS SYSTEM:  Alert & Oriented X3, Good concentration  PSYCH: normal affect     LABS:                         14.3   4.47  )-----------( 324      ( 16 Jul 2018 07:33 )             43.1     07-16    140  |  104  |  17  ----------------------------<  92  3.7   |  26  |  1.13    Ca    9.1      16 Jul 2018 07:33                                Culture Results:   No growth (07-10 @ 23:03)                Radiology: 64 yo male with history of abnormal cxr today in office presents with confusion x coming to [facility] June 5 from [place]. As per sister patient seems confused and verbal not responsive at times (10 Jul 2018 15:20)  hiv NEGATIVE   neuro work up noted  afb x 2 so far NEGATIVE       Allergies    No Known Allergies    Intolerances        MEDICATIONS  (STANDING):  amLODIPine   Tablet 10 milliGRAM(s) Oral daily  aspirin enteric coated 81 milliGRAM(s) Oral daily  cyanocobalamin Injectable 1000 MICROGram(s) IntraMuscular daily  enoxaparin Injectable 40 milliGRAM(s) SubCutaneous every 24 hours  metoprolol tartrate 50 milliGRAM(s) Oral two times a day    MEDICATIONS  (PRN):      REVIEW OF SYSTEMS:    unable to obtain  feels fine    VITAL SIGNS:  T(C): 36.3 (07-16-18 @ 11:19), Max: 36.6 (07-15-18 @ 23:36)  T(F): 97.3 (07-16-18 @ 11:19), Max: 97.9 (07-15-18 @ 23:36)  HR: 86 (07-16-18 @ 17:58) (76 - 86)  BP: 147/91 (07-16-18 @ 17:58) (146/73 - 163/97)  RR: 16 (07-16-18 @ 14:06) (16 - 18)  SpO2: 98% (07-16-18 @ 14:06) (97% - 99%)  Wt(kg): --    PHYSICAL EXAM:    GENERAL: not in any distress  HEENT: Neck is supple, normocephalic, atraumatic   CHEST/LUNG: Clear to auscultation bilaterally; No rales, rhonchi, wheezing  HEART: Regular rate and rhythm; No murmurs, rubs, or gallops  ABDOMEN: Soft, Nontender, Nondistended; Bowel sounds present, no rebound   EXTREMITIES:  2+ Peripheral Pulses, No clubbing, cyanosis, or edema  cachectic and confused  SKIN: No rashes or lesions  BACK: no pressor sore   NERVOUS SYSTEM:  Alert & confused; giggling as usual    LABS:                         14.3   4.47  )-----------( 324      ( 16 Jul 2018 07:33 )             43.1     07-16    140  |  104  |  17  ----------------------------<  92  3.7   |  26  |  1.13    Ca    9.1      16 Jul 2018 07:33                                Culture Results:   No growth (07-10 @ 23:03)                Radiology:    < from: CT Chest No Cont (07.16.18 @ 15:34) >  mpression: Diffuse reticulonodular infiltrates are present in the left   upper lobe compatible with pneumonitis.    Gallstone.    Small left hepatic cyst                JESUS LAWRENCE M.D., ATTENDING RADIOLOGIST  This document has been electronically signed. Jul 16 2018  4:00PM                < end of copied text >

## 2018-07-16 NOTE — PROGRESS NOTE ADULT - SUBJECTIVE AND OBJECTIVE BOX
INTERVAL HPI:  64 yo male with history of abnormal cxr( was taken for immigration purpose in [place] per sister).  [place] in [place] on June 5th with Immigrant Visa.  presented  with confusion As per sister patient seems confused and nonverbal ,  responsive at times (10 Jul 2018 15:20)  Pt confused, not able to provide history. Spoke with sister at bedside. According to her no sob, cough, fever, night sweat or weight loss.  seen for suspected TB as CXR reported abnormal before coming to [place].    OVERNIGHT EVENTS:  Comfortable    Vital Signs Last 24 Hrs  T(C): 36.3 (16 Jul 2018 11:19), Max: 36.6 (15 Jul 2018 23:36)  T(F): 97.3 (16 Jul 2018 11:19), Max: 97.9 (15 Jul 2018 23:36)  HR: 86 (16 Jul 2018 17:58) (76 - 86)  BP: 147/91 (16 Jul 2018 17:58) (146/73 - 163/97)  BP(mean): --  RR: 16 (16 Jul 2018 14:06) (16 - 18)  SpO2: 98% (16 Jul 2018 14:06) (97% - 99%)    PHYSICAL EXAM:  GEN:         Awake, responsive and comfortable.  HEENT:    Normal.    RESP:       no wheezing.  CVS:          Regular rate and rhythm.   ABD:         Soft, non-tender, non-distended;     MEDICATIONS  (STANDING):  amLODIPine   Tablet 10 milliGRAM(s) Oral daily  aspirin enteric coated 81 milliGRAM(s) Oral daily  cyanocobalamin Injectable 1000 MICROGram(s) IntraMuscular daily  enoxaparin Injectable 40 milliGRAM(s) SubCutaneous every 24 hours  metoprolol tartrate 50 milliGRAM(s) Oral two times a day    LABS:                        14.3   4.47  )-----------( 324      ( 16 Jul 2018 07:33 )             43.1     07-16    140  |  104  |  17  ----------------------------<  92  3.7   |  26  |  1.13    Ca    9.1      16 Jul 2018 07:33    07-11 @ 00:30  pH: 7.44  pCO2: 44  pO2: 90  SaO2: 97    < from: CT Chest No Cont (07.16.18 @ 15:34) >    EXAM:  CT CHEST                          PROCEDURE DATE:  07/16/2018      INTERPRETATION:      CT tomographic sections of the chest were performed from the thoracic   inlet to the upper abdomen. Axial sections were performed followed by   reformatted parasagittal and coronal MIP reconstructions. No intravenous   contrast.      History:  Altered mental status and hypertension    Comparisons: Chest x-ray dated 7/10/2018    Findings:  The right lung is relatively clear. Diffuse reticulonodular   infiltrates are present in the left upper lobe and apical region. No   significant pleural effusions or pulmonic congestion.. The pulmonary   vasculature and aorta are normal in caliber and contour. The heart size   is normal. No pericardial effusion. There is no evidence of   lymphadenopathy in the hilum, mediastinum or subcarinal area. The axilla   appears normal.    Bone window examination is normal for age.    Limited views of the upper abdomen demonstrate a normal size liver and   spleen. 10 mm left hepatic cyst. The adrenal glands are unremarkable.   Small gallstone in the gallbladder.    Impression: Diffuse reticulonodular infiltrates are present in the left   upper lobe compatible with pneumonitis.    Gallstone.    Small left hepatic cyst    JESUS LAWRENCE M.D., ATTENDING RADIOLOGIST  This document has been electronically signed. Jul 16 2018  4:00PM      ASSESSMENT AND PLAN:  ·	Altered mental status.  ·	Right occipital  infarct.  ·	Abnormal Chest X Ray.  ·	HTN  ·	Dehydration.    Has reticulonodular infiltrate HARPREET, positive PPD but no pulmonary symptoms, normal sedrate, 3 negative sputum for AFB and normal procalcitonin & WBC. Will discuss CT findings with Radiology.  Also positive RPR and Treponema pallidum.

## 2018-07-17 RX ADMIN — ENOXAPARIN SODIUM 40 MILLIGRAM(S): 100 INJECTION SUBCUTANEOUS at 22:24

## 2018-07-17 RX ADMIN — Medication 50 MILLIGRAM(S): at 17:22

## 2018-07-17 RX ADMIN — AMLODIPINE BESYLATE 10 MILLIGRAM(S): 2.5 TABLET ORAL at 05:56

## 2018-07-17 RX ADMIN — PREGABALIN 1000 MICROGRAM(S): 225 CAPSULE ORAL at 12:32

## 2018-07-17 RX ADMIN — Medication 81 MILLIGRAM(S): at 12:33

## 2018-07-17 RX ADMIN — Medication 50 MILLIGRAM(S): at 05:56

## 2018-07-17 NOTE — DIETITIAN INITIAL EVALUATION ADULT. - PERTINENT LABORATORY DATA
07-16  Hgb 14.3 g/dL Hct 43.1 % 07-16 Na140 mmol/L Glu 92 mg/dL K+ 3.7 mmol/L Cr  1.13 mg/dL BUN 17 mg/dL 07-10 Alb 3.8 g/dL 07-10 [id number] 5.7 %<H> 07-11 Chol 186 mg/dL  mg/dL<H> HDL 41 mg/dL Trig 63 mg/dL07-10 ALT 33 U/L AST 27 U/L Alkaline Phosphatase 104 U/L

## 2018-07-17 NOTE — PROGRESS NOTE ADULT - ASSESSMENT
[place]mo TB  esr is so low; no fevers ; this is not consistent with active TB ;;   CT chest noted  HIV NEGATIVE   sputum AFB pending x 3 all NEGATIVE   continue isolation   no fevers , no cough    Quanteferon test is positive   so is rpr   dementia work up in progress  low b12  PATIENT NEEDS SPINAL TAP PLEASE;; neuro follow up needed   discussed with pulm yesterday

## 2018-07-17 NOTE — PROGRESS NOTE ADULT - SUBJECTIVE AND OBJECTIVE BOX
1Patient is a 63y old  Male who presents with a chief complaint of change in mental status (10 Jul 2018 15:20)      INTERVAL HPI/OVERNIGHT EVENTS:  pt with no compliant  MEDICATIONS  (STANDING):  amLODIPine   Tablet 10 milliGRAM(s) Oral daily  aspirin enteric coated 81 milliGRAM(s) Oral daily  cyanocobalamin Injectable 1000 MICROGram(s) IntraMuscular daily  enoxaparin Injectable 40 milliGRAM(s) SubCutaneous every 24 hours  metoprolol tartrate 50 milliGRAM(s) Oral two times a day    MEDICATIONS  (PRN):      Allergies    No Known Allergies    Intolerances        REVIEW OF SYSTEMS:  CONSTITUTIONAL: No fever, weight loss, or fatigue  EYES: No eye pain, visual disturbances, or discharge  ENMT:  No difficulty hearing, tinnitus, vertigo; No sinus or throat pain  NECK: No pain or stiffness  BREASTS: No pain, masses, or nipple discharge  RESPIRATORY: No cough, wheezing, chills or hemoptysis; No shortness of breath  CARDIOVASCULAR: No chest pain, palpitations, dizziness, or leg swelling  GASTROINTESTINAL: No abdominal or epigastric pain. No nausea, vomiting, or hematemesis; No diarrhea or constipation. No melena or hematochezia.  GENITOURINARY: No dysuria, frequency, hematuria, or incontinence  NEUROLOGICAL: No headaches, memory loss, loss of strength, numbness, or tremors  SKIN: No itching, burning, rashes, or lesions   LYMPH NODES: No enlarged glands  ENDOCRINE: No heat or cold intolerance; No hair loss  MUSCULOSKELETAL: No joint pain or swelling; No muscle, back, or extremity pain  PSYCHIATRIC: No depression, anxiety, mood swings, or difficulty sleeping  HEME/LYMPH: No easy bruising, or bleeding gums  ALLERGY AND IMMUNOLOGIC: No hives or eczema    Vital Signs Last 24 Hrs  T(C): 36.2 (17 Jul 2018 11:18), Max: 36.2 (17 Jul 2018 00:24)  T(F): 97.2 (17 Jul 2018 11:18), Max: 97.2 (17 Jul 2018 11:18)  HR: 78 (17 Jul 2018 11:18) (72 - 86)  BP: 134/56 (17 Jul 2018 11:18) (134/56 - 167/96)  BP(mean): --  RR: 16 (17 Jul 2018 11:18) (16 - 17)  SpO2: 100% (17 Jul 2018 11:18) (97% - 100%)    PHYSICAL EXAM:  GENERAL: NAD, well-groomed, well-developed  HEAD:  Atraumatic, Normocephalic  EYES: EOMI, PERRLA, conjunctiva and sclera clear  ENMT: No tonsillar erythema, exudates, or enlargement; Moist mucous membranes, Good dentition, No lesions  NECK: Supple, No JVD, Normal thyroid  NERVOUS SYSTEM:  Alert & Oriented X3, Good concentration; Motor Strength 5/5 B/L upper and lower extremities; DTRs 2+ intact and symmetric  CHEST/LUNG: Clear to percussion bilaterally; No rales, rhonchi, wheezing, or rubs  HEART: Regular rate and rhythm; No murmurs, rubs, or gallops  ABDOMEN: Soft, Nontender, Nondistended; Bowel sounds present  EXTREMITIES:  2+ Peripheral Pulses, No clubbing, cyanosis, or edema  LYMPH: No lymphadenopathy noted  SKIN: No rashes or lesions    LABS:                        14.3   4.47  )-----------( 324      ( 16 Jul 2018 07:33 )             43.1     07-16    140  |  104  |  17  ----------------------------<  92  3.7   |  26  |  1.13    Ca    9.1      16 Jul 2018 07:33          CAPILLARY BLOOD GLUCOSE        CULTURES:    HEMOGLOBIN A1C:    CHOLESTEROL:        RADIOLOGY & ADDITIONAL TESTS:

## 2018-07-17 NOTE — PROGRESS NOTE ADULT - SUBJECTIVE AND OBJECTIVE BOX
INTERVAL HPI:  64 yo male with history of abnormal cxr( was taken for immigration purpose in [place] per sister).  [place] in [place] on [place] 5th with Imm[place] Visa.  presented  with confusion As per sister patient seems confused and nonverbal ,  responsive at times (10 Jul 2018 15:20)  Pt confused, not able to provide history. Spoke with sister at bedside. According to her no sob, cough, fever, night sweat or weight loss.  seen for suspected TB as CXR reported abnormal [place] coming to [place].    OVERNIGHT EVENTS:  no new issues reported.    Vital Signs Last 24 Hrs  T(C): 36.2 (17 Jul 2018 11:18), Max: 36.2 (17 Jul 2018 00:24)  T(F): 97.2 (17 Jul 2018 11:18), Max: 97.2 (17 Jul 2018 11:18)  HR: 78 (17 Jul 2018 11:18) (72 - 86)  BP: 134/56 (17 Jul 2018 11:18) (134/56 - 167/96)  BP(mean): --  RR: 16 (17 Jul 2018 11:18) (16 - 17)  SpO2: 100% (17 Jul 2018 11:18) (97% - 100%)    PHYSICAL EXAM:  GEN:         Awake, responsive and comfortable.  HEENT:    Normal.    RESP:        no distress.  CVS:          Regular rate and rhythm.   ABD:         Soft, non-tender, non-distended;     MEDICATIONS  (STANDING):  amLODIPine   Tablet 10 milliGRAM(s) Oral daily  aspirin enteric coated 81 milliGRAM(s) Oral daily  cyanocobalamin Injectable 1000 MICROGram(s) IntraMuscular daily  enoxaparin Injectable 40 milliGRAM(s) SubCutaneous every 24 hours  metoprolol tartrate 50 milliGRAM(s) Oral two times a day    LABS:                        14.3   4.47  )-----------( 324      ( 16 Jul 2018 07:33 )             43.1     07-16    140  |  104  |  17  ----------------------------<  92  3.7   |  26  |  1.13    Ca    9.1      16 Jul 2018 07:33    07-11 @ 00:30  pH: 7.44  pCO2: 44  pO2: 90  SaO2: 97    ASSESSMENT AND PLAN:  ·	Altered mental status.  ·	Right occipital  infarct.  ·	Abnormal Chest X Ray.  ·	HTN  ·	Dehydration.    CT chest findings discussed with Dr. LESTER Rodgers(chief of chest Radiology for [facility]).  Findings are more consistent with Sarcoidosis.  Will send ACE level. INTERVAL HPI:  64 yo male with history of abnormal cxr( was taken for immigration purpose in [place] per sister).  [place] in [place] on [place] 5th with [place] Visa.  presented  with confusion As per sister patient seems confused and nonverbal ,  responsive at times (10 Jul 2018 15:20)  Pt confused, not able to provide history. Spoke with sister at bedside. According to her no sob, cough, fever, night sweat or weight loss.  seen for suspected TB as CXR reported abnormal [place] coming to [place].    OVERNIGHT EVENTS:  no new issues reported.    Vital Signs Last 24 Hrs  T(C): 36.2 (17 Jul 2018 11:18), Max: 36.2 (17 Jul 2018 00:24)  T(F): 97.2 (17 Jul 2018 11:18), Max: 97.2 (17 Jul 2018 11:18)  HR: 78 (17 Jul 2018 11:18) (72 - 86)  BP: 134/56 (17 Jul 2018 11:18) (134/56 - 167/96)  BP(mean): --  RR: 16 (17 Jul 2018 11:18) (16 - 17)  SpO2: 100% (17 Jul 2018 11:18) (97% - 100%)    PHYSICAL EXAM:  GEN:         Awake, responsive and comfortable.  HEENT:    Normal.    RESP:        no distress.  CVS:          Regular rate and rhythm.   ABD:         Soft, non-tender, non-distended;     MEDICATIONS  (STANDING):  amLODIPine   Tablet 10 milliGRAM(s) Oral daily  aspirin enteric coated 81 milliGRAM(s) Oral daily  cyanocobalamin Injectable 1000 MICROGram(s) IntraMuscular daily  enoxaparin Injectable 40 milliGRAM(s) SubCutaneous every 24 hours  metoprolol tartrate 50 milliGRAM(s) Oral two times a day    LABS:                        14.3   4.47  )-----------( 324      ( 16 Jul 2018 07:33 )             43.1     07-16    140  |  104  |  17  ----------------------------<  92  3.7   |  26  |  1.13    Ca    9.1      16 Jul 2018 07:33    07-11 @ 00:30  pH: 7.44  pCO2: 44  pO2: 90  SaO2: 97    ASSESSMENT AND PLAN:  ·	Altered mental status.  ·	Right occipital  infarct.  ·	Abnormal Chest X Ray.  ·	HTN  ·	Dehydration.    CT chest findings discussed with Dr. LESTER Rodgers(chief of chest Radiology for [facility]).  Findings are more consistent with Sarcoidosis.  Will send ACE level.  ADDENDUM:  ID is requesting bronchoscopy in view of HARPREET nodular infiltrate and positive QuantiFeron Gold. Although pt has no pulmonary symptoms and normal sedrate.  Dr. Carlin  called for Bronchoscopy with transbronchial biopsy.

## 2018-07-17 NOTE — PROGRESS NOTE ADULT - SUBJECTIVE AND OBJECTIVE BOX
64 yo male with history of abnormal cxr today in office presents with confusion x coming to [facility] June 5 from [place]. As per sister patient seems confused and verbal not responsive at times (10 Jul 2018 15:20)  hiv NEGATIVE   neuro work up noted  afb x 3 so far NEGATIVE   qgtb pos  rpr pos    Allergies    No Known Allergies    Intolerances        MEDICATIONS  (STANDING):  amLODIPine   Tablet 10 milliGRAM(s) Oral daily  aspirin enteric coated 81 milliGRAM(s) Oral daily  cyanocobalamin Injectable 1000 MICROGram(s) IntraMuscular daily  enoxaparin Injectable 40 milliGRAM(s) SubCutaneous every 24 hours  metoprolol tartrate 50 milliGRAM(s) Oral two times a day    MEDICATIONS  (PRN):      REVIEW OF SYSTEMS:     unable to obtain     VITAL SIGNS:  T(C): 36.4 (07-17-18 @ 17:09), Max: 36.4 (07-17-18 @ 17:09)  T(F): 97.6 (07-17-18 @ 17:09), Max: 97.6 (07-17-18 @ 17:09)  HR: 91 (07-17-18 @ 17:09) (72 - 91)  BP: 143/90 (07-17-18 @ 17:09) (134/56 - 167/96)  RR: 16 (07-17-18 @ 17:09) (16 - 17)  SpO2: 95% (07-17-18 @ 17:09) (95% - 100%)  Wt(kg): --    PHYSICAL EXAM:    GENERAL: not in any distress  HEENT: Neck is supple, normocephalic, atraumatic   CHEST/LUNG: Clear to auscultation bilaterally; No rales, rhonchi, wheezing  HEART: Regular rate and rhythm; No murmurs, rubs, or gallops  ABDOMEN: Soft, Nontender, Nondistended; Bowel sounds present, no rebound   EXTREMITIES:  2+ Peripheral Pulses, No clubbing, cyanosis, or edema  SKIN: No rashes or lesions  BACK: no pressor sore   NERVOUS SYSTEM:  Alert & confused    LABS:                         14.3   4.47  )-----------( 324      ( 16 Jul 2018 07:33 )             43.1     07-16    140  |  104  |  17  ----------------------------<  92  3.7   |  26  |  1.13    Ca    9.1      16 Jul 2018 07:33                                Culture Results:   No growth (07-10 @ 23:03)                Radiology:

## 2018-07-17 NOTE — DIETITIAN INITIAL EVALUATION ADULT. - ENERGY NEEDS
Height (cm): 157.48 (07-10)  Weight (kg): 54.2 (07-11)  BMI (kg/m2): 21.9 (07-11)  IBW: 53.5 kg       % IBW:  101%           UBW: ?            %UBW: ?

## 2018-07-17 NOTE — DIETITIAN INITIAL EVALUATION ADULT. - SOURCE
other (specify)/patient/Chart review , RN, pt is Creole speaking, pt accepted offer for use of [employer] phone, expressive aphasia noted, pt appeared to have some difficulty in providing detailed diet history

## 2018-07-18 RX ADMIN — Medication 81 MILLIGRAM(S): at 11:49

## 2018-07-18 RX ADMIN — ENOXAPARIN SODIUM 40 MILLIGRAM(S): 100 INJECTION SUBCUTANEOUS at 21:34

## 2018-07-18 RX ADMIN — PREGABALIN 1000 MICROGRAM(S): 225 CAPSULE ORAL at 11:50

## 2018-07-18 RX ADMIN — AMLODIPINE BESYLATE 10 MILLIGRAM(S): 2.5 TABLET ORAL at 05:29

## 2018-07-18 RX ADMIN — Medication 50 MILLIGRAM(S): at 17:47

## 2018-07-18 RX ADMIN — Medication 50 MILLIGRAM(S): at 05:29

## 2018-07-18 NOTE — PROGRESS NOTE ADULT - SUBJECTIVE AND OBJECTIVE BOX
64 yo male with history of abnormal cxr today in office presents with confusion x coming to [facility] June 5 from [place]. As per sister patient seems confused and verbal not responsive at times (10 Jul 2018 15:20)      Allergies    No Known Allergies    Intolerances        MEDICATIONS  (STANDING):  amLODIPine   Tablet 10 milliGRAM(s) Oral daily  aspirin enteric coated 81 milliGRAM(s) Oral daily  cyanocobalamin Injectable 1000 MICROGram(s) IntraMuscular daily  enoxaparin Injectable 40 milliGRAM(s) SubCutaneous every 24 hours  metoprolol tartrate 50 milliGRAM(s) Oral two times a day    MEDICATIONS  (PRN):      REVIEW OF SYSTEMS:    cannot obtain s patient is confused     VITAL SIGNS:  T(C): 36.3 (07-18-18 @ 11:11), Max: 36.4 (07-17-18 @ 17:09)  T(F): 97.3 (07-18-18 @ 11:11), Max: 97.6 (07-17-18 @ 17:09)  HR: 76 (07-18-18 @ 11:11) (70 - 91)  BP: 142/88 (07-18-18 @ 11:11) (137/87 - 143/90)  RR: 18 (07-18-18 @ 11:11) (16 - 18)  SpO2: 97% (07-18-18 @ 11:11) (95% - 98%)  Wt(kg): --    PHYSICAL EXAM:    GENERAL: not in any distress  HEENT: Neck is supple, normocephalic, atraumatic   CHEST/LUNG: Clear to percussion bilaterally; No rales, rhonchi, wheezing  HEART: Regular rate and rhythm; No murmurs, rubs, or gallops  ABDOMEN: Soft, Nontender, Nondistended; Bowel sounds present, no rebound   EXTREMITIES:  2+ Peripheral Pulses, No clubbing, cyanosis, or edema  GENITOURINARY:   SKIN: No rashes or lesions  BACK: no pressor sore   NERVOUS SYSTEM:  Alert , cannot communicable due to mental status vs language problems ?       LABS:                                     Radiology:

## 2018-07-18 NOTE — PROGRESS NOTE ADULT - ASSESSMENT
[place]ymptomatic [place] r/[place] pulmo TB   CT chest noted HARPREET infiltrates, pneumonitis ?   HIV NEGATIVE   sputum AFB pending x 3 all NEGATIVE   continue isolation   no fevers , no cough , no neurological symptoms moving extremities freely, only confused ?   Quanteferon test is positive   RPR with titer of 1:1   dementia work up in progress  low b12  PATIENT NEEDS SPINAL TAP , please send for CSF cell count, pr, glucose, CSF VDRL and CSF FTA abs , will defer to primary team   neurology consult may benefit   Pulmonary notes note, consistent with Sarcoidosis ? , will FU ACE level   we will FU

## 2018-07-18 NOTE — PROGRESS NOTE ADULT - SUBJECTIVE AND OBJECTIVE BOX
Patient is a 63y old  Male who presents with a chief complaint of change in mental status (10 Jul 2018 15:20)      INTERVAL HPI/OVERNIGHT EVENTS:  pt with no complaint  MEDICATIONS  (STANDING):  amLODIPine   Tablet 10 milliGRAM(s) Oral daily  aspirin enteric coated 81 milliGRAM(s) Oral daily  cyanocobalamin Injectable 1000 MICROGram(s) IntraMuscular daily  enoxaparin Injectable 40 milliGRAM(s) SubCutaneous every 24 hours  metoprolol tartrate 50 milliGRAM(s) Oral two times a day    MEDICATIONS  (PRN):      Allergies    No Known Allergies    Intolerances        REVIEW OF SYSTEMS:  CONSTITUTIONAL: No fever, weight loss, or fatigue  EYES: No eye pain, visual disturbances, or discharge  ENMT:  No difficulty hearing, tinnitus, vertigo; No sinus or throat pain  NECK: No pain or stiffness  BREASTS: No pain, masses, or nipple discharge  RESPIRATORY: No cough, wheezing, chills or hemoptysis; No shortness of breath  CARDIOVASCULAR: No chest pain, palpitations, dizziness, or leg swelling  GASTROINTESTINAL: No abdominal or epigastric pain. No nausea, vomiting, or hematemesis; No diarrhea or constipation. No melena or hematochezia.  GENITOURINARY: No dysuria, frequency, hematuria, or incontinence  NEUROLOGICAL: No headaches, memory loss, loss of strength, numbness, or tremors  SKIN: No itching, burning, rashes, or lesions   LYMPH NODES: No enlarged glands  ENDOCRINE: No heat or cold intolerance; No hair loss  MUSCULOSKELETAL: No joint pain or swelling; No muscle, back, or extremity pain  PSYCHIATRIC: No depression, anxiety, mood swings, or difficulty sleeping  HEME/LYMPH: No easy bruising, or bleeding gums  ALLERGY AND IMMUNOLOGIC: No hives or eczema    Vital Signs Last 24 Hrs  T(C): 36.3 (18 Jul 2018 11:11), Max: 36.4 (17 Jul 2018 17:09)  T(F): 97.3 (18 Jul 2018 11:11), Max: 97.6 (17 Jul 2018 17:09)  HR: 76 (18 Jul 2018 11:11) (70 - 91)  BP: 142/88 (18 Jul 2018 11:11) (137/87 - 143/90)  BP(mean): --  RR: 18 (18 Jul 2018 11:11) (16 - 18)  SpO2: 97% (18 Jul 2018 11:11) (95% - 98%)    PHYSICAL EXAM:  GENERAL: NAD, well-groomed, well-developed  HEAD:  Atraumatic, Normocephalic  EYES: EOMI, PERRLA, conjunctiva and sclera clear  ENMT: No tonsillar erythema, exudates, or enlargement; Moist mucous membranes, Good dentition, No lesions  NECK: Supple, No JVD, Normal thyroid  NERVOUS SYSTEM:  Alert & Oriented X3, Good concentration; Motor Strength 5/5 B/L upper and lower extremities; DTRs 2+ intact and symmetric  CHEST/LUNG: Clear to percussion bilaterally; No rales, rhonchi, wheezing, or rubs  HEART: Regular rate and rhythm; No murmurs, rubs, or gallops  ABDOMEN: Soft, Nontender, Nondistended; Bowel sounds present  EXTREMITIES:  2+ Peripheral Pulses, No clubbing, cyanosis, or edema  LYMPH: No lymphadenopathy noted  SKIN: No rashes or lesions    LABS:              CAPILLARY BLOOD GLUCOSE        CULTURES:    HEMOGLOBIN A1C:    CHOLESTEROL:        RADIOLOGY & ADDITIONAL TESTS:

## 2018-07-18 NOTE — PROGRESS NOTE ADULT - SUBJECTIVE AND OBJECTIVE BOX
INTERVAL HPI:  64 yo male with history of abnormal cxr( was taken for immigration purpose in [place] per sister).  [place] in [place] on [place] 5th with Immigrant Visa.  presented  with confusion As per sister patient seems confused and nonverbal ,  responsive at times (10 Jul 2018 15:20)  Pt confused, not able to provide history. Spoke with sister at bedside. According to her no sob, cough, fever, night sweat or weight loss.  seen for suspected TB as CXR reported abnormal before coming to [place].    OVERNIGHT EVENTS:  Awake and comfortable.    Vital Signs Last 24 Hrs  T(C): 36.7 (18 Jul 2018 17:01), Max: 36.7 (18 Jul 2018 17:01)  T(F): 98.1 (18 Jul 2018 17:01), Max: 98.1 (18 Jul 2018 17:01)  HR: 80 (18 Jul 2018 17:01) (70 - 80)  BP: 146/85 (18 Jul 2018 17:01) (137/87 - 146/85)  BP(mean): --  RR: 17 (18 Jul 2018 17:01) (17 - 18)  SpO2: 98% (18 Jul 2018 17:01) (95% - 98%)    PHYSICAL EXAM:  GEN:         Awake, responsive and comfortable.  HEENT:    Normal.    RESP:        no wheezing.  CVS:          Regular rate and rhythm.   ABD:         Soft, non-tender, non-distended;     MEDICATIONS  (STANDING):  amLODIPine   Tablet 10 milliGRAM(s) Oral daily  aspirin enteric coated 81 milliGRAM(s) Oral daily  cyanocobalamin Injectable 1000 MICROGram(s) IntraMuscular daily  enoxaparin Injectable 40 milliGRAM(s) SubCutaneous every 24 hours  metoprolol tartrate 50 milliGRAM(s) Oral two times a day    ASSESSMENT AND PLAN:  ·	Altered mental status.  ·	Right occipital  infarct.  ·	HARPREET nodular infiltrated, likely Sarcoid.  ·	HTN  ·	Dehydration.    Seen by Thoracic Surgery for Bronchoscopy.  Pulmonary stable for procedure.

## 2018-07-19 LAB — ACE SERPL-CCNC: 71 U/L — SIGNIFICANT CHANGE UP (ref 14–82)

## 2018-07-19 RX ADMIN — PREGABALIN 1000 MICROGRAM(S): 225 CAPSULE ORAL at 11:28

## 2018-07-19 RX ADMIN — Medication 50 MILLIGRAM(S): at 05:16

## 2018-07-19 RX ADMIN — Medication 50 MILLIGRAM(S): at 21:08

## 2018-07-19 RX ADMIN — AMLODIPINE BESYLATE 10 MILLIGRAM(S): 2.5 TABLET ORAL at 05:16

## 2018-07-19 NOTE — PROGRESS NOTE ADULT - SUBJECTIVE AND OBJECTIVE BOX
INTERVAL HPI:   64 yo male with history of abnormal cxr( was taken for immigration purpose in [place] per sister).  [place] in [place] on [place] 5th with Imm[place] Visa.  presented  with confusion As per sister patient seems confused and nonverbal ,  responsive at times (10 Jul 2018 15:20)  Pt confused, not able to provide history. Spoke with sister at bedside. According to her no sob, cough, fever, night sweat or weight loss.  seen for suspected TB as CXR reported abnormal before coming to [place].    OVERNIGHT EVENTS:  No significant change.    Vital Signs Last 24 Hrs  T(C): 36.7 (19 Jul 2018 11:06), Max: 36.7 (18 Jul 2018 17:01)  T(F): 98 (19 Jul 2018 11:06), Max: 98.1 (18 Jul 2018 17:01)  HR: 78 (19 Jul 2018 11:06) (78 - 80)  BP: 161/98 (19 Jul 2018 11:06) (146/85 - 161/98)  BP(mean): --  RR: 18 (19 Jul 2018 11:06) (17 - 18)  SpO2: 96% (19 Jul 2018 11:06) (96% - 98%)    PHYSICAL EXAM:  GEN:         Awake, responsive and comfortable.  HEENT:    Normal.    RESP:      no distress.  CVS:          Regular rate and rhythm.   ABD:         Soft, non-tender, non-distended;     MEDICATIONS  (STANDING):  amLODIPine   Tablet 10 milliGRAM(s) Oral daily  aspirin enteric coated 81 milliGRAM(s) Oral daily  cyanocobalamin Injectable 1000 MICROGram(s) IntraMuscular daily  enoxaparin Injectable 40 milliGRAM(s) SubCutaneous every 24 hours  metoprolol tartrate 50 milliGRAM(s) Oral two times a day    ASSESSMENT AND PLAN:  ·	Altered mental status.  ·	Right occipital  infarct.  ·	HARPREET nodular infiltrated, likely Sarcoid.  ·	HTN  ·	Dehydration.    For bronchoscopy.

## 2018-07-19 NOTE — PROGRESS NOTE ADULT - SUBJECTIVE AND OBJECTIVE BOX
OR canceled as the the pt had food.  Rescheduled for Bronch tomorrow      OVERNIGHT EVENTS:  No significant change.    Vital Signs Last 24 Hrs  T(C): 36.7 (19 Jul 2018 11:06), Max: 36.7 (18 Jul 2018 17:01)  T(F): 98 (19 Jul 2018 11:06), Max: 98.1 (18 Jul 2018 17:01)  HR: 78 (19 Jul 2018 11:06) (78 - 80)  BP: 161/98 (19 Jul 2018 11:06) (146/85 - 161/98)  BP(mean): --  RR: 18 (19 Jul 2018 11:06) (17 - 18)  SpO2: 96% (19 Jul 2018 11:06) (96% - 98%)    PHYSICAL EXAM:  GEN:         Awake, responsive and comfortable.  HEENT:    Normal.    RESP:      no distress.  CVS:          Regular rate and rhythm.   ABD:         Soft, non-tender, non-distended;     MEDICATIONS  (STANDING):  amLODIPine   Tablet 10 milliGRAM(s) Oral daily  aspirin enteric coated 81 milliGRAM(s) Oral daily  cyanocobalamin Injectable 1000 MICROGram(s) IntraMuscular daily  enoxaparin Injectable 40 milliGRAM(s) SubCutaneous every 24 hours  metoprolol tartrate 50 milliGRAM(s) Oral two times a day    ASSESSMENT AND PLAN:  ·	Altered mental status.  ·	Right occipital  infarct.  ·	HARPREET nodular infiltrated, likely Sarcoid.  ·	HTN  ·	Dehydration.      PLan    Bronch and BAL tomorrow

## 2018-07-19 NOTE — DISCHARGE NOTE ADULT - SECONDARY DIAGNOSIS.
Dementia without behavioral disturbance, unspecified dementia type Hypertension, unspecified type Metabolic encephalopathy

## 2018-07-19 NOTE — PROGRESS NOTE ADULT - ASSESSMENT
[place]   CT chest noted HARPREET infiltrates, pneumonitis ?   HIV NEGATIVE   sputum AFB pending x 3 all NEGATIVE   continue isolation   no fevers , no cough , no neurological symptoms moving extremities freely, only confused ?   Quanteferon test is positive   RPR with titer of 1:1   dementia work up in progress  low b12  PATIENT NEEDS SPINAL TAP , please send for CSF cell count, pr, glucose, CSF VDRL and CSF FTA abs , will defer to primary team   neurology consult may benefit   Pulmonary notes note, consistent with Sarcoidosis ? FU work up   we will FU

## 2018-07-19 NOTE — PROGRESS NOTE ADULT - SUBJECTIVE AND OBJECTIVE BOX
HPI:  64 yo male with history of abnormal cxr today in office presents with confusion x coming to [facility] June 5 from [place]. As per sister patient seems confused and verbal not responsive at times (10 Jul 2018 15:20)      Allergies    No Known Allergies    Intolerances        MEDICATIONS  (STANDING):  amLODIPine   Tablet 10 milliGRAM(s) Oral daily  aspirin enteric coated 81 milliGRAM(s) Oral daily  cyanocobalamin Injectable 1000 MICROGram(s) IntraMuscular daily  enoxaparin Injectable 40 milliGRAM(s) SubCutaneous every 24 hours  metoprolol tartrate 50 milliGRAM(s) Oral two times a day    MEDICATIONS  (PRN):      REVIEW OF SYSTEMS:    cannot communicable, confused      VITAL SIGNS:  T(C): 36.7 (07-19-18 @ 11:06), Max: 36.7 (07-18-18 @ 17:01)  T(F): 98 (07-19-18 @ 11:06), Max: 98.1 (07-18-18 @ 17:01)  HR: 78 (07-19-18 @ 11:06) (78 - 80)  BP: 161/98 (07-19-18 @ 11:06) (146/85 - 161/98)  RR: 18 (07-19-18 @ 11:06) (17 - 18)  SpO2: 96% (07-19-18 @ 11:06) (96% - 98%)  Wt(kg): --    PHYSICAL EXAM:    GENERAL: not in any distress  HEENT: Neck is supple, normocephalic, atraumatic   CHEST/LUNG: Clear to percussion bilaterally; No rales, rhonchi, wheezing  HEART: Regular rate and rhythm; No murmurs, rubs, or gallops  ABDOMEN: Soft, Nontender, Nondistended; Bowel sounds present, no rebound   EXTREMITIES:  2+ Peripheral Pulses, No clubbing, cyanosis, or edema  GENITOURINARY:   SKIN: No rashes or lesions  BACK: no pressor sore   NERVOUS SYSTEM:  Alert & confused     LABS:                                                   Radiology:

## 2018-07-19 NOTE — DISCHARGE NOTE ADULT - NS AS DC STROKE ED MATERIALS
Need for Followup After Discharge/Stroke Warning Signs and Symptoms/Call 911 for Stroke/Risk Factors for Stroke/Stroke Education Booklet/Prescribed Medications

## 2018-07-19 NOTE — DISCHARGE NOTE ADULT - MEDICATION SUMMARY - MEDICATIONS TO TAKE
I will START or STAY ON the medications listed below when I get home from the hospital:    aspirin 81 mg oral delayed release tablet  -- 1 tab(s) by mouth once a day  -- Indication: For Cerebrovascular accident (CVA), unspecified mechanism    Zocor 20 mg oral tablet  -- 1 tab(s) by mouth once a day (at bedtime)  -- Indication: For Hypertension, unspecified type    metoprolol tartrate 50 mg oral tablet  -- 1 tab(s) by mouth 2 times a day  -- Indication: For HTN (hypertension)    amLODIPine 10 mg oral tablet  -- 1 tab(s) by mouth once a day  -- Indication: For HTN (hypertension) I will START or STAY ON the medications listed below when I get home from the hospital:    aspirin 81 mg oral delayed release tablet  -- 1 tab(s) by mouth once a day  -- Indication: For Cerebrovascular accident (CVA), unspecified mechanism    Zocor 20 mg oral tablet  -- 1 tab(s) by mouth once a day (at bedtime)  -- Indication: For Cerebrovascular accident (CVA), unspecified mechanism    isoniazid 300 mg oral tablet  -- 1 tab(s) by mouth once a day   -- Do not drink alcoholic beverages when taking this medication.  It is very important that you take or use this exactly as directed.  Do not skip doses or discontinue unless directed by your doctor.  Take medication on an empty stomach 1 hour before or 2 to 3 hours after a meal unless otherwise directed by your doctor.    -- Indication: For Tuberculosis of lung    metoprolol tartrate 50 mg oral tablet  -- 1 tab(s) by mouth 2 times a day  -- Indication: For AMS, HYPERTENSION    amLODIPine 10 mg oral tablet  -- 1 tab(s) by mouth once a day  -- Indication: For AMS, HYPERTENSION    pyridoxine 100 mg oral tablet  -- 1 tab(s) by mouth once a day   -- Indication: For Tuberculosis of lung

## 2018-07-19 NOTE — PROGRESS NOTE ADULT - SUBJECTIVE AND OBJECTIVE BOX
Subjective Complaints:  Historian:       Consult requested by ER doctor:                  Attending:     HPI:  62 yo male with history of abnormal cxr today in office presents with confusion x coming to [facility] June 5 from [facility]. As per sister patient seems confused and verbal not responsive at times (10 Jul 2018 15:20)    JENNIFER DUQUE    PAST MEDICAL & SURGICAL HISTORY:  63yMale    MEDICATIONS  (STANDING):  amLODIPine   Tablet 10 milliGRAM(s) Oral daily  aspirin enteric coated 81 milliGRAM(s) Oral daily  cyanocobalamin Injectable 1000 MICROGram(s) IntraMuscular daily  enoxaparin Injectable 40 milliGRAM(s) SubCutaneous every 24 hours  metoprolol tartrate 50 milliGRAM(s) Oral two times a day    MEDICATIONS  (PRN):      Allergies    No Known Allergies    Intolerances      FAMILY HISTORY:      REVIEW OF SYSTEMS:  General:  No wt loss, fevers, chills, night sweats  Eyes:  Good vision, no reported pain  ENT:  No sore throat, pain, runny nose, dysphagia  CV:  No pain, palpitatioins, hypo/hypertension  Resp:  No dyspnea, cough, tachypnea, wheezing  GI:  No pain, nausea, vomiting, diarrhea, constipatiion  :  No pain, bleeding, incontinence, nocturia  Muscle:  No pain, weakness  Breast:  No pain, abscess, mass, discharge  Neuro:  No weakness, tingling, memory problems  Psych:  No fatigue, insomnia, mood problems, depression  Endocrine:  No polyuria, polydypsia, cold/heat intolerance  Heme:  No petechiae, ecchymosis, easy bruisability  Skin:  No rash, tattoos, scars, edema      Vital Signs Last 24 Hrs  T(C): 36.7 (19 Jul 2018 11:06), Max: 36.7 (18 Jul 2018 17:01)  T(F): 98 (19 Jul 2018 11:06), Max: 98.1 (18 Jul 2018 17:01)  HR: 78 (19 Jul 2018 11:06) (78 - 80)  BP: 161/98 (19 Jul 2018 11:06) (146/85 - 161/98)  BP(mean): --  RR: 18 (19 Jul 2018 11:06) (17 - 18)  SpO2: 96% (19 Jul 2018 11:06) (96% - 98%)    GENERAL PHYSICAL EXAM:  General:  Appears stated age, well-groomed, well-nourished, no distress  HEENT:  NC/AT, patent nares w/ pink mucosa, OP clear w/o lesions, PERRL, EOMI, conjunctivae clear, no thyromegaly, nodules, adenopathy, no JVD  Chest:  Full & symmetric excursion, no increased effort, breath sounds clear  Cardiovascular:  Regular rhythm, S1, S2, no murmur/rub/S3/S4, no carotid/femoral/abdominal bruit, radial/pedal pulses 2+, no edema  Abdomen:  Soft, non-tender, non-distended, normoactive bowel sounds, no HSM  Extremities:  Gait & station:   Digits:   Nails:   Joints, Bones, Muscles:   ROM:   Stability:  Skin:  No rash/erythema/ecchymoses/petechiae/wounds/abscess/warm/dry  Musculoskeletal:  Full ROM in all joints w/o swelling/tenderness/effusion    NEUROLOGICAL EXAM:  HENT:  Normocephalic head; atraumatic head.  Neck supple.  ENT: normal looking.  Mental State:    Alert.   oriented to person, place.  Coherent.  Speech clear and intact.  Cooperative.  Responds appropriately.  memory is impaired  Cranial Nerves:  II-XII:   Pupils round and reactive to light and accommodation.  Extraocular movements full.  Visual fields full (no homonymous hemianopsia).  Visual acuity wnl.  Facial symmetry intact.  Tongue midline.  Motor Functions:  Motor strength is 5/5  Sensory Functions: unreliable    Reflexes:  Deep tendon reflexes normoactive to biceps, knees and ankles.plantar responses are flexor   Cerebellar Testing:    Finger to nose intact.  Nystagmus absent.  Gait : hesitant     LABS:                  RADIOLOGY & ADDITIONAL STUDIES:    IR Procedure: Routine  Transport: [facility]  Provider's Contact #: [phone] (07-18 @ 11:51)  Cerebrospinal Fluid Cell Count-1: Routine (07-18 @ 11:51)  Glucose, CSF: Routine (07-18 @ 11:51)  Protein, CSF: Routine (07-18 @ 11:51)  Gram Stain: Routine  Specimen Source: CSF (07-18 @ 11:51)  VDRL Titer, CSF: Routine (07-18 @ 11:51)  Diet, NPO:   NPO for Procedure/Test     NPO Start Date: 19-Jul-2018,   NPO Start Time: 07:00 (07-19 @ 07:42)      Assessment & Opinion:63 y o man seen for evaluation because of confusion found to have positive RPR ,patient is in need of lumbar punture to R/O Neurosyphi. patient received lovenox last night and LP will be done in AM     Recommendations:  Brain MRI.  Carotid doppler.  Echocardiogram.  EEG.   DVT prophylaxis as ordered.  Medications:  Hold lovenox

## 2018-07-19 NOTE — DISCHARGE NOTE ADULT - PATIENT PORTAL LINK FT
You can access the [facility], offered by [facility], by registering with the following website: [email or web address]

## 2018-07-19 NOTE — DISCHARGE NOTE ADULT - CARE PLAN
Principal Discharge DX:	Cerebrovascular accident (CVA), unspecified mechanism  Goal:	cont meds  Assessment and plan of treatment:	follow up in 1 week with PMD  Secondary Diagnosis:	Dementia without behavioral disturbance, unspecified dementia type  Secondary Diagnosis:	Hypertension, unspecified type  Secondary Diagnosis:	Metabolic encephalopathy

## 2018-07-19 NOTE — PROGRESS NOTE ADULT - SUBJECTIVE AND OBJECTIVE BOX
Patient is a 63y old  Male who presents with a chief complaint of change in mental status (19 Jul 2018 10:17)      INTERVAL HPI/OVERNIGHT EVENTS:  pt with no complaint  MEDICATIONS  (STANDING):  amLODIPine   Tablet 10 milliGRAM(s) Oral daily  aspirin enteric coated 81 milliGRAM(s) Oral daily  cyanocobalamin Injectable 1000 MICROGram(s) IntraMuscular daily  enoxaparin Injectable 40 milliGRAM(s) SubCutaneous every 24 hours  metoprolol tartrate 50 milliGRAM(s) Oral two times a day    MEDICATIONS  (PRN):      Allergies    No Known Allergies    Intolerances        REVIEW OF SYSTEMS:  CONSTITUTIONAL: No fever, weight loss, or fatigue  EYES: No eye pain, visual disturbances, or discharge  ENMT:  No difficulty hearing, tinnitus, vertigo; No sinus or throat pain  NECK: No pain or stiffness  BREASTS: No pain, masses, or nipple discharge  RESPIRATORY: No cough, wheezing, chills or hemoptysis; No shortness of breath  CARDIOVASCULAR: No chest pain, palpitations, dizziness, or leg swelling  GASTROINTESTINAL: No abdominal or epigastric pain. No nausea, vomiting, or hematemesis; No diarrhea or constipation. No melena or hematochezia.  GENITOURINARY: No dysuria, frequency, hematuria, or incontinence  NEUROLOGICAL: No headaches, memory loss, loss of strength, numbness, or tremors  SKIN: No itching, burning, rashes, or lesions   LYMPH NODES: No enlarged glands  ENDOCRINE: No heat or cold intolerance; No hair loss  MUSCULOSKELETAL: No joint pain or swelling; No muscle, back, or extremity pain  PSYCHIATRIC: No depression, anxiety, mood swings, or difficulty sleeping  HEME/LYMPH: No easy bruising, or bleeding gums  ALLERGY AND IMMUNOLOGIC: No hives or eczema    Vital Signs Last 24 Hrs  T(C): 36.7 (19 Jul 2018 11:06), Max: 36.7 (18 Jul 2018 17:01)  T(F): 98 (19 Jul 2018 11:06), Max: 98.1 (18 Jul 2018 17:01)  HR: 78 (19 Jul 2018 11:06) (78 - 80)  BP: 161/98 (19 Jul 2018 11:06) (146/85 - 161/98)  BP(mean): --  RR: 18 (19 Jul 2018 11:06) (17 - 18)  SpO2: 96% (19 Jul 2018 11:06) (96% - 98%)    PHYSICAL EXAM:  GENERAL: NAD, well-groomed, well-developed  HEAD:  Atraumatic, Normocephalic  EYES: EOMI, PERRLA, conjunctiva and sclera clear  ENMT: No tonsillar erythema, exudates, or enlargement; Moist mucous membranes, Good dentition, No lesions  NECK: Supple, No JVD, Normal thyroid  NERVOUS SYSTEM:  Alert & Oriented X3, Good concentration; Motor Strength 5/5 B/L upper and lower extremities; DTRs 2+ intact and symmetric  CHEST/LUNG: Clear to percussion bilaterally; No rales, rhonchi, wheezing, or rubs  HEART: Regular rate and rhythm; No murmurs, rubs, or gallops  ABDOMEN: Soft, Nontender, Nondistended; Bowel sounds present  EXTREMITIES:  2+ Peripheral Pulses, No clubbing, cyanosis, or edema  LYMPH: No lymphadenopathy noted  SKIN: No rashes or lesions    LABS:              CAPILLARY BLOOD GLUCOSE        CULTURES:    HEMOGLOBIN A1C:    CHOLESTEROL:        RADIOLOGY & ADDITIONAL TESTS:

## 2018-07-20 LAB
ANION GAP SERPL CALC-SCNC: 5 MMOL/L — SIGNIFICANT CHANGE UP (ref 5–17)
APPEARANCE CSF: CLEAR — SIGNIFICANT CHANGE UP
BUN SERPL-MCNC: 31 MG/DL — HIGH (ref 7–23)
CALCIUM SERPL-MCNC: 9.1 MG/DL — SIGNIFICANT CHANGE UP (ref 8.5–10.1)
CHLORIDE SERPL-SCNC: 106 MMOL/L — SIGNIFICANT CHANGE UP (ref 96–108)
CO2 SERPL-SCNC: 31 MMOL/L — SIGNIFICANT CHANGE UP (ref 22–31)
COLOR CSF: SIGNIFICANT CHANGE UP
CREAT SERPL-MCNC: 1.22 MG/DL — SIGNIFICANT CHANGE UP (ref 0.5–1.3)
GLUCOSE CSF-MCNC: 66 MG/DL — SIGNIFICANT CHANGE UP (ref 40–70)
GLUCOSE SERPL-MCNC: 91 MG/DL — SIGNIFICANT CHANGE UP (ref 70–99)
GRAM STN FLD: SIGNIFICANT CHANGE UP
HCT VFR BLD CALC: 43.3 % — SIGNIFICANT CHANGE UP (ref 39–50)
HGB BLD-MCNC: 14.2 G/DL — SIGNIFICANT CHANGE UP (ref 13–17)
MCHC RBC-ENTMCNC: 29.2 PG — SIGNIFICANT CHANGE UP (ref 27–34)
MCHC RBC-ENTMCNC: 32.8 GM/DL — SIGNIFICANT CHANGE UP (ref 32–36)
MCV RBC AUTO: 89.1 FL — SIGNIFICANT CHANGE UP (ref 80–100)
MONOS+MACROS NFR CSF: 2 % — LOW (ref 15–45)
NEUTROPHILS # CSF: 1 % — SIGNIFICANT CHANGE UP (ref 0–6)
NRBC # BLD: 0 /100 WBCS — SIGNIFICANT CHANGE UP (ref 0–0)
NRBC NFR CSF: 5 /UL — SIGNIFICANT CHANGE UP (ref 0–5)
PLATELET # BLD AUTO: 341 K/UL — SIGNIFICANT CHANGE UP (ref 150–400)
POTASSIUM SERPL-MCNC: 4.1 MMOL/L — SIGNIFICANT CHANGE UP (ref 3.5–5.3)
POTASSIUM SERPL-SCNC: 4.1 MMOL/L — SIGNIFICANT CHANGE UP (ref 3.5–5.3)
PROT CSF-MCNC: 43 MG/DL — SIGNIFICANT CHANGE UP (ref 15–45)
RBC # BLD: 4.86 M/UL — SIGNIFICANT CHANGE UP (ref 4.2–5.8)
RBC # CSF: 0 /UL — SIGNIFICANT CHANGE UP (ref 0–0)
RBC # FLD: 13.6 % — SIGNIFICANT CHANGE UP (ref 10.3–14.5)
SODIUM SERPL-SCNC: 142 MMOL/L — SIGNIFICANT CHANGE UP (ref 135–145)
SPECIMEN SOURCE: SIGNIFICANT CHANGE UP
TUBE TYPE: SIGNIFICANT CHANGE UP
WBC # BLD: 5.85 K/UL — SIGNIFICANT CHANGE UP (ref 3.8–10.5)
WBC # FLD AUTO: 5.85 K/UL — SIGNIFICANT CHANGE UP (ref 3.8–10.5)

## 2018-07-20 PROCEDURE — 88305 TISSUE EXAM BY PATHOLOGIST: CPT | Mod: 26

## 2018-07-20 PROCEDURE — 71045 X-RAY EXAM CHEST 1 VIEW: CPT | Mod: 26

## 2018-07-20 RX ORDER — SODIUM CHLORIDE 9 MG/ML
1000 INJECTION, SOLUTION INTRAVENOUS
Qty: 0 | Refills: 0 | Status: DISCONTINUED | OUTPATIENT
Start: 2018-07-20 | End: 2018-07-20

## 2018-07-20 RX ADMIN — PREGABALIN 1000 MICROGRAM(S): 225 CAPSULE ORAL at 12:00

## 2018-07-20 RX ADMIN — AMLODIPINE BESYLATE 10 MILLIGRAM(S): 2.5 TABLET ORAL at 06:49

## 2018-07-20 RX ADMIN — Medication 50 MILLIGRAM(S): at 20:59

## 2018-07-20 RX ADMIN — Medication 50 MILLIGRAM(S): at 06:49

## 2018-07-20 NOTE — PROGRESS NOTE ADULT - SUBJECTIVE AND OBJECTIVE BOX
Patient is a 63y old  Male who presents with a chief complaint of change in mental status (19 Jul 2018 10:17)      INTERVAL HPI/OVERNIGHT EVENTS:  pt is doing better  MEDICATIONS  (STANDING):  amLODIPine   Tablet 10 milliGRAM(s) Oral daily  aspirin enteric coated 81 milliGRAM(s) Oral daily  cyanocobalamin Injectable 1000 MICROGram(s) IntraMuscular daily  enoxaparin Injectable 40 milliGRAM(s) SubCutaneous every 24 hours  metoprolol tartrate 50 milliGRAM(s) Oral two times a day    MEDICATIONS  (PRN):      Allergies    No Known Allergies    Intolerances        REVIEW OF SYSTEMS:  CONSTITUTIONAL: No fever, weight loss, or fatigue  EYES: No eye pain, visual disturbances, or discharge  ENMT:  No difficulty hearing, tinnitus, vertigo; No sinus or throat pain  NECK: No pain or stiffness  BREASTS: No pain, masses, or nipple discharge  RESPIRATORY: No cough, wheezing, chills or hemoptysis; No shortness of breath  CARDIOVASCULAR: No chest pain, palpitations, dizziness, or leg swelling  GASTROINTESTINAL: No abdominal or epigastric pain. No nausea, vomiting, or hematemesis; No diarrhea or constipation. No melena or hematochezia.  GENITOURINARY: No dysuria, frequency, hematuria, or incontinence  NEUROLOGICAL: No headaches, memory loss, loss of strength, numbness, or tremors  SKIN: No itching, burning, rashes, or lesions   LYMPH NODES: No enlarged glands  ENDOCRINE: No heat or cold intolerance; No hair loss  MUSCULOSKELETAL: No joint pain or swelling; No muscle, back, or extremity pain  PSYCHIATRIC: No depression, anxiety, mood swings, or difficulty sleeping  HEME/LYMPH: No easy bruising, or bleeding gums  ALLERGY AND IMMUNOLOGIC: No hives or eczema    Vital Signs Last 24 Hrs  T(C): 36.7 (20 Jul 2018 11:50), Max: 36.8 (19 Jul 2018 17:07)  T(F): 98 (20 Jul 2018 11:50), Max: 98.2 (19 Jul 2018 17:07)  HR: 90 (20 Jul 2018 11:50) (77 - 90)  BP: 142/95 (20 Jul 2018 11:50) (136/87 - 169/91)  BP(mean): --  RR: 16 (20 Jul 2018 11:50) (16 - 16)  SpO2: 100% (20 Jul 2018 11:50) (98% - 100%)    PHYSICAL EXAM:  GENERAL: NAD, well-groomed, well-developed  HEAD:  Atraumatic, Normocephalic  EYES: EOMI, PERRLA, conjunctiva and sclera clear  ENMT: No tonsillar erythema, exudates, or enlargement; Moist mucous membranes, Good dentition, No lesions  NECK: Supple, No JVD, Normal thyroid  NERVOUS SYSTEM:  Alert & Oriented X3, Good concentration; Motor Strength 5/5 B/L upper and lower extremities; DTRs 2+ intact and symmetric  CHEST/LUNG: Clear to percussion bilaterally; No rales, rhonchi, wheezing, or rubs  HEART: Regular rate and rhythm; No murmurs, rubs, or gallops  ABDOMEN: Soft, Nontender, Nondistended; Bowel sounds present  EXTREMITIES:  2+ Peripheral Pulses, No clubbing, cyanosis, or edema  LYMPH: No lymphadenopathy noted  SKIN: No rashes or lesions    LABS:                        14.2   5.85  )-----------( 341      ( 20 Jul 2018 08:54 )             43.3     07-20    142  |  106  |  31<H>  ----------------------------<  91  4.1   |  31  |  1.22    Ca    9.1      20 Jul 2018 08:54          CAPILLARY BLOOD GLUCOSE        CULTURES:    HEMOGLOBIN A1C:    CHOLESTEROL:        RADIOLOGY & ADDITIONAL TESTS:

## 2018-07-20 NOTE — PROGRESS NOTE ADULT - SUBJECTIVE AND OBJECTIVE BOX
INTERVAL HPI:   64 yo male with history of abnormal cxr( was taken for immigration purpose in [place] per sister).  Arrived in [place] on June 5th with Immigrant Visa.  presented  with confusion As per sister patient seems confused and nonverbal ,  responsive at times (10 Jul 2018 15:20)  Pt confused, not able to provide history. Spoke with sister at bedside. According to her no sob, cough, fever, night sweat or weight loss.  seen for suspected TB as CXR reported abnormal before coming to [place].    OVERNIGHT EVENTS:  no clinical change.    Vital Signs Last 24 Hrs  T(C): 36.7 (20 Jul 2018 11:50), Max: 36.8 (19 Jul 2018 17:07)  T(F): 98 (20 Jul 2018 11:50), Max: 98.2 (19 Jul 2018 17:07)  HR: 90 (20 Jul 2018 11:50) (77 - 90)  BP: 142/95 (20 Jul 2018 11:50) (136/87 - 169/91)  BP(mean): --  RR: 16 (20 Jul 2018 11:50) (16 - 16)  SpO2: 100% (20 Jul 2018 11:50) (98% - 100%)    PHYSICAL EXAM:  GEN:         Awake, responsive and comfortable.  HEENT:    Normal.    RESP:   CVS:             Regular rate and rhythm.   ABD:         Soft, non-tender, non-distended;   :             No costovertebral angle tenderness  EXTR:            No clubbing, cyanosis or edema  CNS:              Intact sensory and motor function.    MEDICATIONS  (STANDING):  amLODIPine   Tablet 10 milliGRAM(s) Oral daily  aspirin enteric coated 81 milliGRAM(s) Oral daily  cyanocobalamin Injectable 1000 MICROGram(s) IntraMuscular daily  enoxaparin Injectable 40 milliGRAM(s) SubCutaneous every 24 hours  metoprolol tartrate 50 milliGRAM(s) Oral two times a day    LABS:                        14.2   5.85  )-----------( 341      ( 20 Jul 2018 08:54 )             43.3     07-20    142  |  106  |  31<H>  ----------------------------<  91  4.1   |  31  |  1.22    Ca    9.1      20 Jul 2018 08:54    ASSESSMENT AND PLAN:  ·	Altered mental status.  ·	Right occipital  infarct.  ·	HARPREET nodular infiltrated, likely Sarcoid.  ·	HTN  ·	Dehydration.    Discussed with Dr. Carlin. for bronchoscopy with transbronchial biopsy today.

## 2018-07-20 NOTE — PROGRESS NOTE ADULT - SUBJECTIVE AND OBJECTIVE BOX
62 yo male with history of abnormal cxr today in office presents with confusion x coming to [facility] June 5 from [place]. As per sister patient seems confused and verbal not responsive at times (10 Jul 2018 15:20)  hiv NEGATIVE   neuro work up noted  afb x 3 so far NEGATIVE   qgtb pos  rpr pos        MEDICATIONS  (STANDING):  amLODIPine   Tablet 10 milliGRAM(s) Oral daily  aspirin enteric coated 81 milliGRAM(s) Oral daily  cyanocobalamin Injectable 1000 MICROGram(s) IntraMuscular daily  enoxaparin Injectable 40 milliGRAM(s) SubCutaneous every 24 hours  metoprolol tartrate 50 milliGRAM(s) Oral two times a day    MEDICATIONS  (PRN):      REVIEW OF SYSTEMS:    unable to obtain     VITAL SIGNS:  T(C): 36.7 (07-20-18 @ 11:50), Max: 36.8 (07-19-18 @ 17:07)  T(F): 98 (07-20-18 @ 11:50), Max: 98.2 (07-19-18 @ 17:07)  HR: 90 (07-20-18 @ 11:50) (77 - 90)  BP: 142/95 (07-20-18 @ 11:50) (136/87 - 169/91)  RR: 16 (07-20-18 @ 11:50) (16 - 16)  SpO2: 100% (07-20-18 @ 11:50) (98% - 100%)  Wt(kg): --    PHYSICAL EXAM:    GENERAL: not in any distress  HEENT: Neck is supple, normocephalic, atraumatic   CHEST/LUNG: Clear to auscultation bilaterally; No rales, rhonchi, wheezing  HEART: Regular rate and rhythm; No murmurs, rubs, or gallops  ABDOMEN: Soft, Nontender, Nondistended; Bowel sounds present, no rebound   EXTREMITIES:  2+ Peripheral Pulses, No clubbing, cyanosis, or edema  SKIN: No rashes or lesions  BACK: no pressor sore   NERVOUS SYSTEM:  Alert &confused  LABS:                         14.2   5.85  )-----------( 341      ( 20 Jul 2018 08:54 )             43.3     07-20    142  |  106  |  31<H>  ----------------------------<  91  4.1   |  31  |  1.22    Ca    9.1      20 Jul 2018 08:54                                              Radiology:

## 2018-07-20 NOTE — PROGRESS NOTE ADULT - ASSESSMENT
[place]mo TB   CT chest noted HARPREET infiltrates, pneumonitis ?   HIV NEGATIVE   sputum AFB pending x 3 all NEGATIVE   continue isolation   no fevers , no cough , no neurological symptoms moving extremities freely, only confused ?   Quanteferon test is positive   RPR with titer of 1:1   dementia work up in progress  low b12  PATIENT NEEDS SPINAL TAP , please send for CSF cell count, pr, glucose, CSF VDRL and CSF FTA abs , will defer to primary team

## 2018-07-20 NOTE — CONSULT NOTE ADULT - ASSESSMENT
Subjective Complaints:      Consult requested by ER doctor:                  Attending:     History of Present Illness:  Chief Complaint/Reason for Admission:  History of Present Illness:  HPI:  62 yo male with history of abnormal cxr today in office presents with confusion x coming to [facility] June 5 from [facility]. As per sister patient seems confused and verbal not responsive at times (10 Jul 2018 15:20)        PAST MEDICAL & SURGICAL HISTORY:  63yMale    MEDICATIONS  (STANDING):  amLODIPine   Tablet 10 milliGRAM(s) Oral daily  aspirin enteric coated 81 milliGRAM(s) Oral daily  cyanocobalamin Injectable 1000 MICROGram(s) IntraMuscular daily  enoxaparin Injectable 40 milliGRAM(s) SubCutaneous every 24 hours  lactated ringers. 1000 milliLiter(s) (100 mL/Hr) IV Continuous <Continuous>  metoprolol tartrate 50 milliGRAM(s) Oral two times a day    MEDICATIONS  (PRN):      Allergies    No Known Allergies    Intolerances      FAMILY HISTORY:      REVIEW OF SYSTEMS:  General:  No wt loss, fevers, chills, night sweats  Eyes:  Good vision, no reported pain  ENT:  No sore throat, pain, runny nose, dysphagia  CV:  No pain, palpitatioins, hypo/hypertension  Resp:  No dyspnea, cough, tachypnea, wheezing  GI:  No pain, nausea, vomiting, diarrhea, constipatiion  :  No pain, bleeding, incontinence, nocturia  Muscle:  No pain, weakness  Breast:  No pain, abscess, mass, discharge  Neuro:  No weakness, tingling, memory problems  Psych:  No fatigue, insomnia, mood problems, depression  Endocrine:  No polyuria, polydypsia, cold/heat intolerance  Heme:  No petechiae, ecchymosis, easy bruisability  Skin:  No rash, tattoos, scars, edema      Vital Signs Last 24 Hrs  T(C): 36.1 (20 Jul 2018 18:18), Max: 36.9 (20 Jul 2018 10:06)  T(F): 97 (20 Jul 2018 18:18), Max: 98.4 (20 Jul 2018 10:06)  HR: 97 (20 Jul 2018 18:18) (77 - 107)  BP: 168/71 (20 Jul 2018 18:18) (126/77 - 168/71)  BP(mean): --  RR: 17 (20 Jul 2018 18:18) (16 - 18)  SpO2: 96% (20 Jul 2018 18:18) (95% - 100%)    GENERAL PHYSICAL EXAM:  General:  Appears stated age, well-groomed, well-nourished, no distress  HEENT:  NC/AT, patent nares w/ pink mucosa, OP clear w/o lesions, PERRL, EOMI, conjunctivae clear, no thyromegaly, nodules, adenopathy, no JVD  Chest:  Full & symmetric excursion, no increased effort, breath sounds clear  Cardiovascular:  Regular rhythm, S1, S2, no murmur/rub/S3/S4, no carotid/femoral/abdominal bruit, radial/pedal pulses 2+, no edema  Abdomen:  Soft, non-tender, non-distended, normoactive bowel sounds, no HSM  Extremities:  Gait & station:   Digits:   Nails:   Joints, Bones, Muscles:   ROM:   Stability:  Skin:  No rash/erythema/ecchymoses/petechiae/wounds/abscess/warm/dry  Musculoskeletal:  Full ROM in all joints w/o swelling/tenderness/effusion    NEUROLOGICAL EXAM:  HENT:  Normocephalic head; atraumatic head.  Neck supple.  ENT: normal looking.  Mental State:    Alert.  Fully oriented to person, place and date.  Coherent.  Speech clear and intact.  Cooperative.  Responds appropriately.    Cranial Nerves:  II-XII:   Pupils round and reactive to light and accommodation.  Extraocular movements full.  Visual fields full (no homonymous hemianopsia).  Visual acuity wnl.  Facial symmetry intact.  Tongue midline.  Motor Functions:  Moves all extremities.  No pronator drift of UE.  Claps hand well.  Hand  intact bilaterally.  Ambulatory.    Sensory Functions:   Intact to touch and pinprick to face and extremities.    Reflexes:  Deep tendon reflexes normoactive to biceps, knees and ankles.  Babinski absent (present).  Cerebellar Testing:    Finger to nose intact.  Nystagmus absent.  Neurovascular: Carotid auscultation full without bruits.      LABS:                        14.2   5.85  )-----------( 341      ( 20 Jul 2018 08:54 )             43.3     07-20    142  |  106  |  31<H>  ----------------------------<  91  4.1   |  31  |  1.22    Ca    9.1      20 Jul 2018 08:54              RADIOLOGY & ADDITIONAL STUDIES:      Assessment & Opinion: events noted vdrl positive  s/p apinal tap  no headache arm leg 4/5 s/p broncoscopy non focla exam  clear csf  sent for test pt tolerated the procedure  well.     Recommendations:  Brain MRI.  Carotid doppler.  Echocardiogram.  EEG.   DVT prophylaxis as ordered.  Medications:
asymptomatic ro infiltrate  right/o tb   need CT chest   hiv test sister discussed with patient in front of me and he said ok to test   no fevers   await ct chest before starting antibiotics   qgtb  dementia work up ;; weight loss ;   will follow with you thanks

## 2018-07-21 LAB
CRYPTOC AG CSF-ACNC: NEGATIVE — SIGNIFICANT CHANGE UP
GRAM STN FLD: SIGNIFICANT CHANGE UP
NIGHT BLUE STAIN TISS: SIGNIFICANT CHANGE UP
SPECIMEN SOURCE: SIGNIFICANT CHANGE UP
SPECIMEN SOURCE: SIGNIFICANT CHANGE UP

## 2018-07-21 RX ADMIN — AMLODIPINE BESYLATE 10 MILLIGRAM(S): 2.5 TABLET ORAL at 06:20

## 2018-07-21 RX ADMIN — ENOXAPARIN SODIUM 40 MILLIGRAM(S): 100 INJECTION SUBCUTANEOUS at 06:20

## 2018-07-21 RX ADMIN — Medication 50 MILLIGRAM(S): at 06:20

## 2018-07-21 RX ADMIN — PREGABALIN 1000 MICROGRAM(S): 225 CAPSULE ORAL at 11:24

## 2018-07-21 RX ADMIN — ENOXAPARIN SODIUM 40 MILLIGRAM(S): 100 INJECTION SUBCUTANEOUS at 21:17

## 2018-07-21 RX ADMIN — Medication 50 MILLIGRAM(S): at 18:42

## 2018-07-21 RX ADMIN — Medication 81 MILLIGRAM(S): at 11:24

## 2018-07-21 NOTE — PROGRESS NOTE ADULT - SUBJECTIVE AND OBJECTIVE BOX
INTERVAL HPI:  62 yo male with history of abnormal cxr( was taken for immigration purpose in [place] per sister).  [place]rived in [place] on June 5th with Immigrant Visa.  presented  with confusion As per sister patient seems confused and nonverbal ,  responsive at times (10 Jul 2018 15:20)  Pt confused, not able to provide history. Spoke with sister at bedside. According to her no sob, cough, fever, night sweat or weight loss.  seen for suspected TB as CXR reported abnormal before coming to [place].  07/20/18:  Had bronchoscopy with washing and biopsy.    OVERNIGHT EVENTS:  comfortable , no distress.    Vital Signs Last 24 Hrs  T(C): 36.7 (21 Jul 2018 11:18), Max: 36.8 (20 Jul 2018 20:01)  T(F): 98 (21 Jul 2018 11:18), Max: 98.2 (20 Jul 2018 20:01)  HR: 84 (21 Jul 2018 11:18) (80 - 107)  BP: 149/80 (21 Jul 2018 11:18) (126/77 - 168/71)  BP(mean): --  RR: 18 (21 Jul 2018 11:18) (16 - 18)  SpO2: 98% (21 Jul 2018 11:18) (95% - 100%)    PHYSICAL EXAM:  GEN:         Awake, responsive and comfortable.  HEENT:    Normal.    RESP:        no wheezing.  CVS:           Regular rate and rhythm.   ABD:         Soft, non-tender, non-distended;   :             No costovertebral angle tenderness  EXTR:            No clubbing, cyanosis or edema  CNS:              Intact sensory and motor function.    MEDICATIONS  (STANDING):  amLODIPine   Tablet 10 milliGRAM(s) Oral daily  aspirin enteric coated 81 milliGRAM(s) Oral daily  cyanocobalamin Injectable 1000 MICROGram(s) IntraMuscular daily  enoxaparin Injectable 40 milliGRAM(s) SubCutaneous every 24 hours  metoprolol tartrate 50 milliGRAM(s) Oral two times a day    LABS:                        14.2   5.85  )-----------( 341      ( 20 Jul 2018 08:54 )             43.3     07-20    142  |  106  |  31<H>  ----------------------------<  91  4.1   |  31  |  1.22    Ca    9.1      20 Jul 2018 08:54    ASSESSMENT AND PLAN:  ·	Altered mental status.  ·	Right occipital  infarct.  ·	HARPREET nodular infiltrated, likely Sarcoid.  ·	HTN  ·	Dehydration improved.    Follow bronchoscopy results.

## 2018-07-21 NOTE — PROGRESS NOTE ADULT - SUBJECTIVE AND OBJECTIVE BOX
Patient is a 63y old  Male who presents with a chief complaint of change in mental status (19 Jul 2018 10:17)      INTERVAL HPI/OVERNIGHT EVENTS:  pt is doing better  MEDICATIONS  (STANDING):  amLODIPine   Tablet 10 milliGRAM(s) Oral daily  aspirin enteric coated 81 milliGRAM(s) Oral daily  cyanocobalamin Injectable 1000 MICROGram(s) IntraMuscular daily  enoxaparin Injectable 40 milliGRAM(s) SubCutaneous every 24 hours  metoprolol tartrate 50 milliGRAM(s) Oral two times a day    MEDICATIONS  (PRN):      Allergies    No Known Allergies    Intolerances        REVIEW OF SYSTEMS:  CONSTITUTIONAL: No fever, weight loss, or fatigue  EYES: No eye pain, visual disturbances, or discharge  ENMT:  No difficulty hearing, tinnitus, vertigo; No sinus or throat pain  NECK: No pain or stiffness  BREASTS: No pain, masses, or nipple discharge  RESPIRATORY: No cough, wheezing, chills or hemoptysis; No shortness of breath  CARDIOVASCULAR: No chest pain, palpitations, dizziness, or leg swelling  GASTROINTESTINAL: No abdominal or epigastric pain. No nausea, vomiting, or hematemesis; No diarrhea or constipation. No melena or hematochezia.  GENITOURINARY: No dysuria, frequency, hematuria, or incontinence  NEUROLOGICAL: No headaches, memory loss, loss of strength, numbness, or tremors  SKIN: No itching, burning, rashes, or lesions   LYMPH NODES: No enlarged glands  ENDOCRINE: No heat or cold intolerance; No hair loss  MUSCULOSKELETAL: No joint pain or swelling; No muscle, back, or extremity pain  PSYCHIATRIC: No depression, anxiety, mood swings, or difficulty sleeping  HEME/LYMPH: No easy bruising, or bleeding gums  ALLERGY AND IMMUNOLOGIC: No hives or eczema    Vital Signs Last 24 Hrs  T(C): 36.4 (21 Jul 2018 05:22), Max: 36.8 (20 Jul 2018 20:01)  T(F): 97.5 (21 Jul 2018 05:22), Max: 98.2 (20 Jul 2018 20:01)  HR: 84 (21 Jul 2018 05:22) (80 - 107)  BP: 147/84 (21 Jul 2018 05:22) (126/77 - 168/71)  BP(mean): --  RR: 17 (21 Jul 2018 05:22) (16 - 17)  SpO2: 98% (21 Jul 2018 05:22) (95% - 100%)    PHYSICAL EXAM:  GENERAL: NAD, well-groomed, well-developed  HEAD:  Atraumatic, Normocephalic  EYES: EOMI, PERRLA, conjunctiva and sclera clear  ENMT: No tonsillar erythema, exudates, or enlargement; Moist mucous membranes, Good dentition, No lesions  NECK: Supple, No JVD, Normal thyroid  NERVOUS SYSTEM:  Alert & Oriented X3, Good concentration; Motor Strength 5/5 B/L upper and lower extremities; DTRs 2+ intact and symmetric  CHEST/LUNG: Clear to percussion bilaterally; No rales, rhonchi, wheezing, or rubs  HEART: Regular rate and rhythm; No murmurs, rubs, or gallops  ABDOMEN: Soft, Nontender, Nondistended; Bowel sounds present  EXTREMITIES:  2+ Peripheral Pulses, No clubbing, cyanosis, or edema  LYMPH: No lymphadenopathy noted  SKIN: No rashes or lesions    LABS:                        14.2   5.85  )-----------( 341      ( 20 Jul 2018 08:54 )             43.3     07-20    142  |  106  |  31<H>  ----------------------------<  91  4.1   |  31  |  1.22    Ca    9.1      20 Jul 2018 08:54          CAPILLARY BLOOD GLUCOSE        CULTURES:    HEMOGLOBIN A1C:    CHOLESTEROL:        RADIOLOGY & ADDITIONAL TESTS:

## 2018-07-22 DIAGNOSIS — A15.0 TUBERCULOSIS OF LUNG: ICD-10-CM

## 2018-07-22 LAB
CULTURE RESULTS: SIGNIFICANT CHANGE UP
GRAM STN FLD: SIGNIFICANT CHANGE UP
SPECIMEN SOURCE: SIGNIFICANT CHANGE UP

## 2018-07-22 RX ADMIN — Medication 50 MILLIGRAM(S): at 17:19

## 2018-07-22 RX ADMIN — AMLODIPINE BESYLATE 10 MILLIGRAM(S): 2.5 TABLET ORAL at 05:43

## 2018-07-22 RX ADMIN — Medication 81 MILLIGRAM(S): at 11:09

## 2018-07-22 RX ADMIN — Medication 50 MILLIGRAM(S): at 05:43

## 2018-07-22 RX ADMIN — ENOXAPARIN SODIUM 40 MILLIGRAM(S): 100 INJECTION SUBCUTANEOUS at 20:46

## 2018-07-22 RX ADMIN — PREGABALIN 1000 MICROGRAM(S): 225 CAPSULE ORAL at 11:10

## 2018-07-22 NOTE — PROGRESS NOTE ADULT - SUBJECTIVE AND OBJECTIVE BOX
Subjective Complaints:  Historian:     63 y o man admitted for bizarre behavior found to have low B-12 and a positive RPR ,status post LP .    REVIEW OF SYSTEMS:  Eyes:  Good vision, no reported pain  ENT:  No sore throat, pain, runny nose, dysphagia  CV:  No pain, palpitatioins, hypo/hypertension  Resp:  No dyspnea, cough, tachypnea, wheezing  GI:  No pain, nausea, vomiting, diarrhea, constipatiion  Muscle:  No pain, weakness  Neuro:  No weakness, tingling, memory problems  Psych:  No fatigue, insomnia, mood problems, depression  Endocrine:  No polyuria, polydypsia, cold/heat intolerance    Vital Signs Last 24 Hrs  T(C): 36.7 (22 Jul 2018 11:14), Max: 36.7 (21 Jul 2018 18:32)  T(F): 98 (22 Jul 2018 11:14), Max: 98.1 (21 Jul 2018 18:32)  HR: 74 (22 Jul 2018 05:23) (74 - 76)  BP: 123/76 (22 Jul 2018 11:14) (123/76 - 150/92)  BP(mean): --  RR: 18 (22 Jul 2018 11:14) (17 - 18)  SpO2: 96% (22 Jul 2018 11:14) (96% - 99%)    GENERAL PHYSICAL EXAM:  General:  Appears stated age, well-groomed, well-nourished, no distress  HEENT:  NC/AT, patent nares w/ pink mucosa, OP clear w/o lesions, PERRL, EOMI, conjunctivae clear, no thyromegaly, nodules, adenopathy, no JVD  Chest:  Full & symmetric excursion, no increased effort, breath sounds clear  Cardiovascular:  Regular rhythm, S1, S2, no murmur/rub/S3/S4, no carotid/femoral/abdominal bruit, radial/pedal pulses 2+, no edema  Abdomen:  Soft, non-tender, non-distended, normoactive bowel sounds, no HSM  Extremities:  Gait & station:   Digits:   Nails:   Joints, Bones, Muscles:   ROM:   Stability:  Skin:  No rash/erythema/ecchymoses/petechiae/wounds/abscess/warm/dry  Musculoskeletal:  Full ROM in all joints w/o swelling/tenderness/effusion    NEUROLOGICAL EXAM:  HENT:  Normocephalic head; atraumatic head.  Neck supple.  ENT: normal looking.  Mental State:    Alert. oriented to person, place   Coherent.  Speech clear and intact.  Cooperative.  Responds appropriately.  Memory is impaired for long and short terms   Cranial Nerves:  II-XII:   Pupils round and reactive to light and accommodation.  Extraocular movements full.  Visual fields full (no homonymous hemianopsia).  Visual acuity wnl.  Facial symmetry intact.  Tongue midline.  Motor Functions:  Moves all extremities.  No pronator drift of UE.  Claps hand well.  Hand  intact bilaterally.  Ambulatory.    Sensory Functions:   Intact to touch and pinprick to face and extremities.    Reflexes:  Deep tendon reflexes normoactive to biceps, knees and ankles.  Babinski absent (present).  Cerebellar Testing:    Finger to nose intact.  Nystagmus absent.  Gait hesitant      LABS:      CSF VDRL is pending             RADIOLOGY & ADDITIONAL STUDIES:        Recommendations:  awaiting result of CSF VDRL DVT prophylaxis as ordered.  Medications:

## 2018-07-22 NOTE — PROGRESS NOTE ADULT - SUBJECTIVE AND OBJECTIVE BOX
Patient is a 63y old  Male who presents with a chief complaint of change in mental status (19 Jul 2018 10:17)      INTERVAL HPI/OVERNIGHT EVENTS:  pt has no complaint, want to go home  MEDICATIONS  (STANDING):  amLODIPine   Tablet 10 milliGRAM(s) Oral daily  aspirin enteric coated 81 milliGRAM(s) Oral daily  cyanocobalamin Injectable 1000 MICROGram(s) IntraMuscular daily  enoxaparin Injectable 40 milliGRAM(s) SubCutaneous every 24 hours  metoprolol tartrate 50 milliGRAM(s) Oral two times a day    MEDICATIONS  (PRN):      Allergies    No Known Allergies    Intolerances        REVIEW OF SYSTEMS:  CONSTITUTIONAL: No fever, weight loss, or fatigue  EYES: No eye pain, visual disturbances, or discharge  ENMT:  No difficulty hearing, tinnitus, vertigo; No sinus or throat pain  NECK: No pain or stiffness  BREASTS: No pain, masses, or nipple discharge  RESPIRATORY: No cough, wheezing, chills or hemoptysis; No shortness of breath  CARDIOVASCULAR: No chest pain, palpitations, dizziness, or leg swelling  GASTROINTESTINAL: No abdominal or epigastric pain. No nausea, vomiting, or hematemesis; No diarrhea or constipation. No melena or hematochezia.  GENITOURINARY: No dysuria, frequency, hematuria, or incontinence  NEUROLOGICAL: No headaches, memory loss, loss of strength, numbness, or tremors  SKIN: No itching, burning, rashes, or lesions   LYMPH NODES: No enlarged glands  ENDOCRINE: No heat or cold intolerance; No hair loss  MUSCULOSKELETAL: No joint pain or swelling; No muscle, back, or extremity pain  PSYCHIATRIC: No depression, anxiety, mood swings, or difficulty sleeping  HEME/LYMPH: No easy bruising, or bleeding gums  ALLERGY AND IMMUNOLOGIC: No hives or eczema    Vital Signs Last 24 Hrs  T(C): 36.3 (22 Jul 2018 05:23), Max: 36.7 (21 Jul 2018 11:18)  T(F): 97.4 (22 Jul 2018 05:23), Max: 98.1 (21 Jul 2018 18:32)  HR: 74 (22 Jul 2018 05:23) (74 - 84)  BP: 125/81 (22 Jul 2018 05:23) (125/81 - 150/92)  BP(mean): --  RR: 17 (22 Jul 2018 05:23) (17 - 18)  SpO2: 96% (22 Jul 2018 05:23) (96% - 99%)    PHYSICAL EXAM:  GENERAL: NAD, well-groomed, well-developed  HEAD:  Atraumatic, Normocephalic  EYES: EOMI, PERRLA, conjunctiva and sclera clear  ENMT: No tonsillar erythema, exudates, or enlargement; Moist mucous membranes, Good dentition, No lesions  NECK: Supple, No JVD, Normal thyroid  NERVOUS SYSTEM:  Alert & Oriented X3, Good concentration; Motor Strength 5/5 B/L upper and lower extremities; DTRs 2+ intact and symmetric  CHEST/LUNG: Clear to percussion bilaterally; No rales, rhonchi, wheezing, or rubs  HEART: Regular rate and rhythm; No murmurs, rubs, or gallops  ABDOMEN: Soft, Nontender, Nondistended; Bowel sounds present  EXTREMITIES:  2+ Peripheral Pulses, No clubbing, cyanosis, or edema  LYMPH: No lymphadenopathy noted  SKIN: No rashes or lesions    LABS:              CAPILLARY BLOOD GLUCOSE        CULTURES:  Culture Results:   No growth to date. (07-21 @ 02:10)  Culture Results:   No growth to date. (07-20 @ 21:08)    HEMOGLOBIN A1C:    CHOLESTEROL:        RADIOLOGY & ADDITIONAL TESTS:

## 2018-07-22 NOTE — PROGRESS NOTE ADULT - SUBJECTIVE AND OBJECTIVE BOX
INTERVAL HPI:   64 yo male with history of abnormal cxr( was taken for immigration purpose in [place] per sister).  [place] in [place] on June 5th with Immigrant Visa.  presented  with confusion As per sister patient seems confused and nonverbal ,  responsive at times (10 Jul 2018 15:20)  Pt confused, not able to provide history. Spoke with sister at bedside. According to her no sob, cough, fever, night sweat or weight loss.  seen for suspected TB as CXR reported abnormal before coming to [place].  07/20/18:  Had bronchoscopy with washing and biopsy.  Negative ABF smear of bronchial washing,    OVERNIGHT EVENTS:  no new issues reported buy staff.    Vital Signs Last 24 Hrs  T(C): 36.7 (22 Jul 2018 11:14), Max: 36.7 (21 Jul 2018 18:32)  T(F): 98 (22 Jul 2018 11:14), Max: 98.1 (21 Jul 2018 18:32)  HR: 74 (22 Jul 2018 05:23) (74 - 76)  BP: 123/76 (22 Jul 2018 11:14) (123/76 - 150/92)  BP(mean): --  RR: 18 (22 Jul 2018 11:14) (17 - 18)  SpO2: 96% (22 Jul 2018 11:14) (96% - 99%)    PHYSICAL EXAM:  GEN:         Awake, responsive and comfortable.  HEENT:    Normal.    RESP:        no distress.  CVS:          Regular rate and rhythm.   ABD:         Soft, non-tender, non-distended;     MEDICATIONS  (STANDING):  amLODIPine   Tablet 10 milliGRAM(s) Oral daily  aspirin enteric coated 81 milliGRAM(s) Oral daily  cyanocobalamin Injectable 1000 MICROGram(s) IntraMuscular daily  enoxaparin Injectable 40 milliGRAM(s) SubCutaneous every 24 hours  metoprolol tartrate 50 milliGRAM(s) Oral two times a day    ASSESSMENT AND PLAN:  ·	Altered mental status.  ·	Right occipital  infarct.  ·	HARPREET nodular infiltrated, likely Sarcoid.  ·	HTN  ·	Dehydration improved.    DC air bore isolation if ok with ID.  Follow transbronchial biopsy.

## 2018-07-22 NOTE — PROGRESS NOTE ADULT - SUBJECTIVE AND OBJECTIVE BOX
HPI:  64 yo male with history of abnormal cxr today in office presents with confusion x coming to [facility] June 5 from [place]. As per sister patient seems confused and verbal not responsive at times (10 Jul 2018 15:20)      Allergies    No Known Allergies    Intolerances        MEDICATIONS  (STANDING):  amLODIPine   Tablet 10 milliGRAM(s) Oral daily  aspirin enteric coated 81 milliGRAM(s) Oral daily  cyanocobalamin Injectable 1000 MICROGram(s) IntraMuscular daily  enoxaparin Injectable 40 milliGRAM(s) SubCutaneous every 24 hours  metoprolol tartrate 50 milliGRAM(s) Oral two times a day    MEDICATIONS  (PRN):      REVIEW OF SYSTEMS:    CONSTITUTIONAL: No fever, chills, weight loss, or fatigue  HEENT: No sore throat, runny nose, ear ache  RESPIRATORY: No cough, wheezing, No shortness of breath  CARDIOVASCULAR: No chest pain, palpitations, dizziness  GASTROINTESTINAL: No abdominal pain. No nausea, vomiting, diarrhea  GENITOURINARY: No dysuria, increase frequency, hematuria, or incontinence  NEUROLOGICAL: No headaches, memory loss, loss of strength, numbness, or tremors, no weakness  EXTREMITY: No pedal edema BLE  SKIN: No itching, burning, rashes, or lesions     VITAL SIGNS:  T(C): 36.7 (07-22-18 @ 16:48), Max: 36.7 (07-22-18 @ 11:14)  T(F): 98 (07-22-18 @ 16:48), Max: 98 (07-22-18 @ 11:14)  HR: 74 (07-22-18 @ 16:48) (74 - 74)  BP: 140/88 (07-22-18 @ 16:48) (123/76 - 140/88)  RR: 18 (07-22-18 @ 16:48) (17 - 18)  SpO2: 96% (07-22-18 @ 16:48) (96% - 96%)  Wt(kg): --    PHYSICAL EXAM:    GENERAL: not in any distress  HEENT: Neck is supple, normocephalic, atraumatic   CHEST/LUNG: Clear to auscultation bilaterally; No rales, rhonchi, wheezing  HEART: Regular rate and rhythm; No murmurs, rubs, or gallops  ABDOMEN: Soft, Nontender, Nondistended; Bowel sounds present, no rebound   EXTREMITIES:  2+ Peripheral Pulses, No clubbing, cyanosis, or edema  GENITOURINARY:   SKIN: No rashes or lesions  BACK: no pressor sore   NERVOUS SYSTEM:  Alert & Oriented X3, Good concentration  PSYCH: normal affect     LABS:                                     Culture Results:   No growth at 48 hours (07-21 @ 02:10)  Culture Results:   No growth to date. (07-20 @ 21:08)                Radiology: patient sent in from PMD office with abn chest xray   just immigrated from [facility] on june 6   as per sister ; was normal 4 years ago ; now totally confused  seen by neuro ; b12 def and pos RPR ; no info re treated syphillis    No Known Allergies    Intolerances        MEDICATIONS  (STANDING):  amLODIPine   Tablet 10 milliGRAM(s) Oral daily  aspirin enteric coated 81 milliGRAM(s) Oral daily  cyanocobalamin Injectable 1000 MICROGram(s) IntraMuscular daily  enoxaparin Injectable 40 milliGRAM(s) SubCutaneous every 24 hours  metoprolol tartrate 50 milliGRAM(s) Oral two times a day    MEDICATIONS  (PRN):      REVIEW OF SYSTEMS:    unable to obtain     VITAL SIGNS:  T(C): 36.7 (07-22-18 @ 16:48), Max: 36.7 (07-22-18 @ 11:14)  T(F): 98 (07-22-18 @ 16:48), Max: 98 (07-22-18 @ 11:14)  HR: 74 (07-22-18 @ 16:48) (74 - 74)  BP: 140/88 (07-22-18 @ 16:48) (123/76 - 140/88)  RR: 18 (07-22-18 @ 16:48) (17 - 18)  SpO2: 96% (07-22-18 @ 16:48) (96% - 96%)  Wt(kg): --    PHYSICAL EXAM:    GENERAL: not in any distress  HEENT: Neck is supple, normocephalic, atraumatic   CHEST/LUNG: Clear to auscultation bilaterally; No rales, rhonchi, wheezing  HEART: Regular rate and rhythm; No murmurs, rubs, or gallops  ABDOMEN: Soft, Nontender, Nondistended; Bowel sounds present, no rebound   EXTREMITIES:  2+ Peripheral Pulses, No clubbing, cyanosis, or edema  SKIN: No rashes or lesions  BACK: no pressor sore   NERVOUS SYSTEM:  Alert & Oriented X2  confused   cachectic     LABS:                                     Culture Results:   No growth at 48 hours (07-21 @ 02:10)  Culture Results:   No growth to date. (07-20 @ 21:08)                Radiology:

## 2018-07-22 NOTE — PROGRESS NOTE ADULT - ASSESSMENT
[place] r/[place] pulmo TB   CT chest noted HARPREET infiltrates, pneumonitis ?   HIV NEGATIVE   sputum AFB pending x 3 all NEGATIVE   continue isolation   no fevers , no cough , no neurological symptoms moving extremities freely, only confused ?   Quanteferon test is positive   RPR with titer of 1:1   dementia work up in progress  low b12  PATIENT NEEDS SPINAL TAP , please send for CSF cell count, pr, glucose, CSF VDRL and CSF FTA abs , will defer to primary team Asymptomatic left upper lobe infiltrates [place] r/o pulmo TB   CT chest noted HARPREET infiltrates, pneumonitis ?   HIV NEGATIVE   sputum AFB pending x 3 all NEGATIVE   continue isolation   no fevers , no cough , no neurological symptoms moving extremities freely, only confused ?   Quanteferon test is positive   RPR with titer of 1:1   dementia work up in progress  low b12  PATIENT NEEDS SPINAL TAP , please send for CSF cell count, pr, glucose, CSF VDRL and CSF FTA abs , will defer to primary team   sp bronch afb smear NEGATIVE in bronch   dc isolation   hiv NEGATIVE   weight loss ; dementia   neuro work up   discussed with dr omer this evening

## 2018-07-23 LAB
CULTURE RESULTS: NO GROWTH — SIGNIFICANT CHANGE UP
GRAM STN FLD: SIGNIFICANT CHANGE UP
SPECIMEN SOURCE: SIGNIFICANT CHANGE UP
VDRL CSF-TITR: NEGATIVE — SIGNIFICANT CHANGE UP

## 2018-07-23 RX ORDER — ASPIRIN/CALCIUM CARB/MAGNESIUM 324 MG
1 TABLET ORAL
Qty: 30 | Refills: 0 | OUTPATIENT
Start: 2018-07-23 | End: 2018-08-21

## 2018-07-23 RX ORDER — METOPROLOL TARTRATE 50 MG
1 TABLET ORAL
Qty: 60 | Refills: 0 | OUTPATIENT
Start: 2018-07-23 | End: 2018-08-21

## 2018-07-23 RX ORDER — AMLODIPINE BESYLATE 2.5 MG/1
1 TABLET ORAL
Qty: 30 | Refills: 0 | OUTPATIENT
Start: 2018-07-23 | End: 2018-08-21

## 2018-07-23 RX ORDER — SIMVASTATIN 20 MG/1
1 TABLET, FILM COATED ORAL
Qty: 30 | Refills: 0 | OUTPATIENT
Start: 2018-07-23 | End: 2018-08-21

## 2018-07-23 RX ORDER — PENICILLIN G BENZATHINE 1200000 [IU]/2ML
2.4 INJECTION, SUSPENSION INTRAMUSCULAR ONCE
Qty: 0 | Refills: 0 | Status: COMPLETED | OUTPATIENT
Start: 2018-07-23 | End: 2018-07-23

## 2018-07-23 RX ADMIN — Medication 50 MILLIGRAM(S): at 17:14

## 2018-07-23 RX ADMIN — AMLODIPINE BESYLATE 10 MILLIGRAM(S): 2.5 TABLET ORAL at 05:36

## 2018-07-23 RX ADMIN — Medication 50 MILLIGRAM(S): at 05:36

## 2018-07-23 RX ADMIN — ENOXAPARIN SODIUM 40 MILLIGRAM(S): 100 INJECTION SUBCUTANEOUS at 20:13

## 2018-07-23 RX ADMIN — Medication 81 MILLIGRAM(S): at 12:15

## 2018-07-23 RX ADMIN — PENICILLIN G BENZATHINE 2.4 MILLION UNIT(S): 1200000 INJECTION, SUSPENSION INTRAMUSCULAR at 17:12

## 2018-07-23 RX ADMIN — PREGABALIN 1000 MICROGRAM(S): 225 CAPSULE ORAL at 12:15

## 2018-07-23 NOTE — PROGRESS NOTE ADULT - SUBJECTIVE AND OBJECTIVE BOX
Progress Note - Anesthesia Acute Pain Management    Germaine Agustin 60 y o  male MRN: [id number]  Unit/Bed#: [facility] 420-01 Encounter: [id number]      INTERVAL HPI:  64 yo male with history of abnormal cxr( was taken for immigration purpose in [place] per sister).  Arrived in [place] on June 5th with Immigrant Visa.  presented  with confusion As per sister patient seems confused and nonverbal ,  responsive at times (10 Jul 2018 15:20)  Pt confused, not able to provide history. Spoke with sister at bedside. According to her no sob, cough, fever, night sweat or weight loss.  seen for suspected TB as CXR reported abnormal before coming to [place].  07/20/18:  Had bronchoscopy with washing and biopsy.  Negative ABF smear of bronchial washing,    OVERNIGHT EVENTS:  comfortable.    Vital Signs Last 24 Hrs  T(C): 36.1 (23 Jul 2018 13:17), Max: 36.7 (22 Jul 2018 16:48)  T(F): 97 (23 Jul 2018 13:17), Max: 98 (22 Jul 2018 16:48)  HR: 77 (23 Jul 2018 13:17) (66 - 77)  BP: 147/98 (23 Jul 2018 13:17) (135/86 - 147/98)  BP(mean): --  RR: 18 (23 Jul 2018 13:17) (18 - 18)  SpO2: 92% (23 Jul 2018 13:17) (92% - 98%)    PHYSICAL EXAM:  GEN:         Awake, responsive and comfortable.  HEENT:    Normal.    RESP:        no distress.  CVS:             Regular rate and rhythm.   ABD:         Soft, non-tender, non-distended;     MEDICATIONS  (STANDING):  amLODIPine   Tablet 10 milliGRAM(s) Oral daily  aspirin enteric coated 81 milliGRAM(s) Oral daily  cyanocobalamin Injectable 1000 MICROGram(s) IntraMuscular daily  enoxaparin Injectable 40 milliGRAM(s) SubCutaneous every 24 hours  metoprolol tartrate 50 milliGRAM(s) Oral two times a day  penicillin   G benzathine Injectable 2.4 Million Unit(s) IntraMuscular once    ASSESSMENT AND PLAN:  ·	Altered mental status.  ·	Right occipital  infarct.  ·	HARPREET nodular infiltrated, likely Sarcoid.  ·	HTN  ·	Dehydration improved.    Follow transbronchial biopsy with additional questions        SIGNATURE: Sowmya Lafleur MD  DATE: January 2, 2018  TIME: 9:05 AM

## 2018-07-23 NOTE — PROGRESS NOTE ADULT - SUBJECTIVE AND OBJECTIVE BOX
HPI:  62 yo male with history of abnormal cxr today in office presents with confusion x coming to [facility] June 5 from [place]. As per sister patient seems confused and verbal not responsive at times (10 Jul 2018 15:20)      Allergies    No Known Allergies    Intolerances        MEDICATIONS  (STANDING):  amLODIPine   Tablet 10 milliGRAM(s) Oral daily  aspirin enteric coated 81 milliGRAM(s) Oral daily  cyanocobalamin Injectable 1000 MICROGram(s) IntraMuscular daily  enoxaparin Injectable 40 milliGRAM(s) SubCutaneous every 24 hours  metoprolol tartrate 50 milliGRAM(s) Oral two times a day  penicillin   G benzathine Injectable 2.4 Million Unit(s) IntraMuscular once    MEDICATIONS  (PRN):      REVIEW OF SYSTEMS:    CONSTITUTIONAL: No fever, chills, weight loss, or fatigue  HEENT: No sore throat, runny nose, ear ache  RESPIRATORY: No cough, wheezing, No shortness of breath  CARDIOVASCULAR: No chest pain, palpitations, dizziness  GASTROINTESTINAL: No abdominal pain. No nausea, vomiting, diarrhea  GENITOURINARY: No dysuria, increase frequency, hematuria, or incontinence  NEUROLOGICAL: No headaches, memory loss, loss of strength, numbness, or tremors, no weakness  EXTREMITY: No pedal edema BLE  SKIN: No itching, burning, rashes, or lesions     VITAL SIGNS:  T(C): 36.1 (07-23-18 @ 13:17), Max: 36.7 (07-22-18 @ 16:48)  T(F): 97 (07-23-18 @ 13:17), Max: 98 (07-22-18 @ 16:48)  HR: 77 (07-23-18 @ 13:17) (66 - 77)  BP: 147/98 (07-23-18 @ 13:17) (135/86 - 147/98)  RR: 18 (07-23-18 @ 13:17) (18 - 18)  SpO2: 92% (07-23-18 @ 13:17) (92% - 98%)  Wt(kg): --    PHYSICAL EXAM:    GENERAL: not in any distress  HEENT: Neck is supple, normocephalic, atraumatic   CHEST/LUNG: Clear to auscultation bilaterally; No rales, rhonchi, wheezing  HEART: Regular rate and rhythm; No murmurs, rubs, or gallops  ABDOMEN: Soft, Nontender, Nondistended; Bowel sounds present, no rebound   EXTREMITIES:  2+ Peripheral Pulses, No clubbing, cyanosis, or edema  GENITOURINARY:   SKIN: No rashes or lesions  BACK: no pressor sore   NERVOUS SYSTEM:  Alert & Oriented X3, Good concentration  PSYCH: normal affect     LABS:                                     Culture Results:   Testing in progress (07-21 @ 02:16)  Culture Results:   No growth at 48 hours (07-21 @ 02:10)  Culture Results:   No growth (07-20 @ 21:08)                Radiology: HPI:  62 yo male with history of abnormal cxr today in office presents with confusion x coming to [facility] June 5 from [place]. As per sister patient seems confused and verbal not responsive at times (10 Jul 2018 15:20)  here rul infiltrates   no fever   no pulm signs and symptoms   SP lumbar puncture     Allergies    No Known Allergies    Intolerances        MEDICATIONS  (STANDING):  amLODIPine   Tablet 10 milliGRAM(s) Oral daily  aspirin enteric coated 81 milliGRAM(s) Oral daily  cyanocobalamin Injectable 1000 MICROGram(s) IntraMuscular daily  enoxaparin Injectable 40 milliGRAM(s) SubCutaneous every 24 hours  metoprolol tartrate 50 milliGRAM(s) Oral two times a day  penicillin   G benzathine Injectable 2.4 Million Unit(s) IntraMuscular once    MEDICATIONS  (PRN):      REVIEW OF SYSTEMS:    unable to obtain     VITAL SIGNS:  T(C): 36.1 (07-23-18 @ 13:17), Max: 36.7 (07-22-18 @ 16:48)  T(F): 97 (07-23-18 @ 13:17), Max: 98 (07-22-18 @ 16:48)  HR: 77 (07-23-18 @ 13:17) (66 - 77)  BP: 147/98 (07-23-18 @ 13:17) (135/86 - 147/98)  RR: 18 (07-23-18 @ 13:17) (18 - 18)  SpO2: 92% (07-23-18 @ 13:17) (92% - 98%)  Wt(kg): --    PHYSICAL EXAM:  today sister by bedside   GENERAL: not in any distress  HEENT: Neck is supple, normocephalic, atraumatic   CHEST/LUNG: Clear to auscultation bilaterally; No rales, rhonchi, wheezing  HEART: Regular rate and rhythm; No murmurs, rubs, or gallops  ABDOMEN: Soft, Nontender, Nondistended; Bowel sounds present, no rebound   EXTREMITIES:  2+ Peripheral Pulses, No clubbing, cyanosis, or edema  SKIN: No rashes or lesions  BACK: no pressor sore   NERVOUS SYSTEM:  Alert & confused  LABS:                                     Culture Results:   Testing in progress (07-21 @ 02:16)  Culture Results:   No growth at 48 hours (07-21 @ 02:10)  Culture Results:   No growth (07-20 @ 21:08)                Radiology:

## 2018-07-23 NOTE — PROGRESS NOTE ADULT - SUBJECTIVE AND OBJECTIVE BOX
Patient is a 63y old  Male who presents with a chief complaint of change in mental status (19 Jul 2018 10:17)      INTERVAL HPI/OVERNIGHT EVENTS:  pt is doing better  MEDICATIONS  (STANDING):  amLODIPine   Tablet 10 milliGRAM(s) Oral daily  aspirin enteric coated 81 milliGRAM(s) Oral daily  cyanocobalamin Injectable 1000 MICROGram(s) IntraMuscular daily  enoxaparin Injectable 40 milliGRAM(s) SubCutaneous every 24 hours  metoprolol tartrate 50 milliGRAM(s) Oral two times a day    MEDICATIONS  (PRN):      Allergies    No Known Allergies    Intolerances        REVIEW OF SYSTEMS:  CONSTITUTIONAL: No fever, weight loss, or fatigue  EYES: No eye pain, visual disturbances, or discharge  ENMT:  No difficulty hearing, tinnitus, vertigo; No sinus or throat pain  NECK: No pain or stiffness  BREASTS: No pain, masses, or nipple discharge  RESPIRATORY: No cough, wheezing, chills or hemoptysis; No shortness of breath  CARDIOVASCULAR: No chest pain, palpitations, dizziness, or leg swelling  GASTROINTESTINAL: No abdominal or epigastric pain. No nausea, vomiting, or hematemesis; No diarrhea or constipation. No melena or hematochezia.  GENITOURINARY: No dysuria, frequency, hematuria, or incontinence  NEUROLOGICAL: No headaches, memory loss, loss of strength, numbness, or tremors  SKIN: No itching, burning, rashes, or lesions   LYMPH NODES: No enlarged glands  ENDOCRINE: No heat or cold intolerance; No hair loss  MUSCULOSKELETAL: No joint pain or swelling; No muscle, back, or extremity pain  PSYCHIATRIC: No depression, anxiety, mood swings, or difficulty sleeping  HEME/LYMPH: No easy bruising, or bleeding gums  ALLERGY AND IMMUNOLOGIC: No hives or eczema    Vital Signs Last 24 Hrs  T(C): 36.3 (23 Jul 2018 05:34), Max: 36.7 (22 Jul 2018 16:48)  T(F): 97.3 (23 Jul 2018 05:34), Max: 98 (22 Jul 2018 16:48)  HR: 68 (23 Jul 2018 05:34) (66 - 74)  BP: 141/85 (23 Jul 2018 05:34) (135/86 - 141/85)  BP(mean): --  RR: 18 (23 Jul 2018 05:34) (18 - 18)  SpO2: 98% (23 Jul 2018 05:34) (96% - 98%)    PHYSICAL EXAM:  GENERAL: NAD, well-groomed, well-developed  HEAD:  Atraumatic, Normocephalic  EYES: EOMI, PERRLA, conjunctiva and sclera clear  ENMT: No tonsillar erythema, exudates, or enlargement; Moist mucous membranes, Good dentition, No lesions  NECK: Supple, No JVD, Normal thyroid  NERVOUS SYSTEM:  Alert & Oriented X3, Good concentration; Motor Strength 5/5 B/L upper and lower extremities; DTRs 2+ intact and symmetric  CHEST/LUNG: Clear to percussion bilaterally; No rales, rhonchi, wheezing, or rubs  HEART: Regular rate and rhythm; No murmurs, rubs, or gallops  ABDOMEN: Soft, Nontender, Nondistended; Bowel sounds present  EXTREMITIES:  2+ Peripheral Pulses, No clubbing, cyanosis, or edema  LYMPH: No lymphadenopathy noted  SKIN: No rashes or lesions    LABS:              CAPILLARY BLOOD GLUCOSE        CULTURES:  Culture Results:   Testing in progress (07-21 @ 02:16)  Culture Results:   No growth at 48 hours (07-21 @ 02:10)  Culture Results:   No growth to date. (07-20 @ 21:08)    HEMOGLOBIN A1C:    CHOLESTEROL:        RADIOLOGY & ADDITIONAL TESTS:

## 2018-07-23 NOTE — PROGRESS NOTE ADULT - ASSESSMENT
[place]mo TB   CT chest noted HARPREET infiltrates, pneumonitis ?   HIV NEGATIVE   sputum AFB pending x 3 all NEGATIVE   continue isolation   no fevers , no cough , no neurological symptoms moving extremities freely, only confused ?   Quanteferon test is positive   RPR with titer of 1:1   dementia work up in progress  low b12  sp bronch afb smear NEGATIVE in bronch   hiv NEGATIVE   weight loss ; dementia   neuro work up noted ; csf vdrl NEGATIVE   patient needs bicillin weekly x 3   will give 1 dose today   sister notified  she will tell dr brooke and get rest of shots from him ;; he is her pcp   discussed with dr omer yesterday ; he thinks this is sarcoidoses   patient needs inh and perhaps rifampin as well till bronch culture are back   would wait for transbronchial bx result before discharge

## 2018-07-23 NOTE — CHART NOTE - NSCHARTNOTEFT_GEN_A_CORE
Assessment:  Pt is Creole speaking, not candidate for use of [employer] phone due to AMS, expressive aphasia.  Pt's sister @ bedside, reports that pt is eating well, no c/o.  Pt adm c AMS, HTN.  Pt c CVA, dementia, metabolic encephalopathy, TB.    Factors impacting intake: [x ] none [ ] nausea  [ ] vomiting [ ] diarrhea [ ] constipation  [ ]chewing problems [ ] swallowing issues  [ ] other:     Diet Prescription: Diet, Regular (07-19-18 @ 11:27)    Intake: 100%    Current Weight: 07-23, 55 kg, 07-11, 54.2 kg, c wt. gain of 0.8 kg  % Weight Change: 1.47%    Pertinent Medications: MEDICATIONS  (STANDING):  amLODIPine   Tablet 10 milliGRAM(s) Oral daily  aspirin enteric coated 81 milliGRAM(s) Oral daily  cyanocobalamin Injectable 1000 MICROGram(s) IntraMuscular daily  enoxaparin Injectable 40 milliGRAM(s) SubCutaneous every 24 hours  metoprolol tartrate 50 milliGRAM(s) Oral two times a day    MEDICATIONS  (PRN):    Pertinent Labs:  07-10 [id number] 5.7 %<H> 07-11 Chol 186 mg/dL  mg/dL<H> HDL 41 mg/dL Trig 63 mg/dL     CAPILLARY BLOOD GLUCOSE        Skin: WDL    Estimated Needs:   [ x] no change as of previous assessment 07-17-18  [ ] recalculated    Previous Nutrition Diagnosis:   [x] No nutrition Diagnosis    [ ] Inadequate Energy Intake [ ]Inadequate Oral Intake [ ] Excessive Energy Intake   [ ] Underweight [ ] Increased Nutrient Needs [ ] Overweight/Obesity   [ ] Altered GI Function [ ] Unintended Weight Loss [ ] Food & Nutrition Related Knowledge Deficit [ ] Malnutrition     New Nutrition Diagnosis: [ x] not applicable       Interventions:   Recommend  [x] Continue c current diet regimen   [ ] Change Diet To:  [ ] Nutrition Supplement  [ ] Nutrition Support  [ ] Other:     Monitoring and Evaluation:   [x ] PO intake [ x ] Tolerance to diet prescription [ x ] weights [ x ] labs[ x ] follow up per protocol  [ ] other: Assessment:  Pt is Creole speaking, not candidate for use of [employer] phone due to AMS, expressive aphasia.  Pt's sister @ bedside, reports that pt is eating well, no c/o.  Pt adm c AMS, HTN.  Pt c CVA, dementia, metabolic encephalopathy, TB.    Factors impacting intake: [x ] none [ ] nausea  [ ] vomiting [ ] diarrhea [ ] constipation  [ ]chewing problems [ ] swallowing issues  [ ] other:     Diet Prescription: Diet, Regular (07-19-18 @ 11:27)    Intake: 100%    Current Weight: 07-23, 55 kg, 07-11, 54.2 kg, c wt. gain of 0.8 kg  % Weight Change: 1.47%    Pertinent Medications: MEDICATIONS  (STANDING):  amLODIPine   Tablet 10 milliGRAM(s) Oral daily  aspirin enteric coated 81 milliGRAM(s) Oral daily  cyanocobalamin Injectable 1000 MICROGram(s) IntraMuscular daily  enoxaparin Injectable 40 milliGRAM(s) SubCutaneous every 24 hours  metoprolol tartrate 50 milliGRAM(s) Oral two times a day    MEDICATIONS  (PRN):    Pertinent Labs:  07-10 [id number] 5.7 %<H> 07-11 Chol 186 mg/dL  mg/dL<H> HDL 41 mg/dL Trig 63 mg/dL     CAPILLARY BLOOD GLUCOSE        Skin: WDL    Estimated Needs:   [ x] no change as of previous assessment 07-17-18  [ ] recalculated    Previous Nutrition Diagnosis:   [x] No nutrition Diagnosis    [ ] Inadequate Energy Intake [ ]Inadequate Oral Intake [ ] Excessive Energy Intake   [ ] Underweight [ ] Increased Nutrient Needs [ ] Overweight/Obesity   [ ] Altered GI Function [ ] Unintended Weight Loss [ ] Food & Nutrition Related Knowledge Deficit [ ] Malnutrition     New Nutrition Diagnosis: [ x] not applicable       Interventions:   Recommend  [ ] Continue c current diet regimen   [x ] Change Diet To: Low sodium for hx of HTN  [ ] Nutrition Supplement  [ ] Nutrition Support  [ ] Other:     Monitoring and Evaluation:   [x ] PO intake [ x ] Tolerance to diet prescription [ x ] weights [ x ] labs[ x ] follow up per protocol  [ ] other:

## 2018-07-23 NOTE — PROGRESS NOTE ADULT - PROBLEM SELECTOR PROBLEM 4
Altered mental status
Hypertension, unspecified type
Tuberculosis of lung

## 2018-07-24 VITALS
HEART RATE: 85 BPM | DIASTOLIC BLOOD PRESSURE: 81 MMHG | TEMPERATURE: 98 F | SYSTOLIC BLOOD PRESSURE: 152 MMHG | OXYGEN SATURATION: 97 % | RESPIRATION RATE: 18 BRPM

## 2018-07-24 PROCEDURE — 88112 CYTOPATH CELL ENHANCE TECH: CPT | Mod: 26

## 2018-07-24 RX ORDER — HEXAVITAMINS
1 TABLET ORAL
Qty: 30 | Refills: 8
Start: 2018-07-24 | End: 2019-04-19

## 2018-07-24 RX ORDER — PYRIDOXINE HCL (VITAMIN B6) 100 MG
1 TABLET ORAL
Qty: 30 | Refills: 8
Start: 2018-07-24 | End: 2019-04-19

## 2018-07-24 RX ORDER — METOPROLOL TARTRATE 50 MG
1 TABLET ORAL
Qty: 60 | Refills: 0
Start: 2018-07-24 | End: 2018-08-22

## 2018-07-24 RX ORDER — SIMVASTATIN 20 MG/1
1 TABLET, FILM COATED ORAL
Qty: 30 | Refills: 0
Start: 2018-07-24 | End: 2018-08-22

## 2018-07-24 RX ORDER — ASPIRIN/CALCIUM CARB/MAGNESIUM 324 MG
1 TABLET ORAL
Qty: 30 | Refills: 0
Start: 2018-07-24 | End: 2018-08-22

## 2018-07-24 RX ORDER — AMLODIPINE BESYLATE 2.5 MG/1
1 TABLET ORAL
Qty: 30 | Refills: 0
Start: 2018-07-24 | End: 2018-08-22

## 2018-07-24 RX ADMIN — PREGABALIN 1000 MICROGRAM(S): 225 CAPSULE ORAL at 11:39

## 2018-07-24 RX ADMIN — Medication 50 MILLIGRAM(S): at 05:47

## 2018-07-24 RX ADMIN — Medication 81 MILLIGRAM(S): at 11:38

## 2018-07-24 RX ADMIN — AMLODIPINE BESYLATE 10 MILLIGRAM(S): 2.5 TABLET ORAL at 05:47

## 2018-07-24 NOTE — PROGRESS NOTE ADULT - PROBLEM SELECTOR PROBLEM 2
Cerebrovascular accident (CVA), unspecified mechanism
Cerebrovascular accident (CVA), unspecified mechanism
Metabolic encephalopathy
Altered mental status
Cerebrovascular accident (CVA), unspecified mechanism
Dementia without behavioral disturbance, unspecified dementia type
Metabolic encephalopathy
Cerebrovascular accident (CVA), unspecified mechanism
Cerebrovascular accident (CVA), unspecified mechanism

## 2018-07-24 NOTE — PROGRESS NOTE ADULT - PROVIDER SPECIALTY LIST ADULT
Infectious Disease
Internal Medicine
Neurology
Neurology
Pulmonology
Thoracic Surgery
Internal Medicine
Neurology
Pulmonology
Internal Medicine
Infectious Disease
Internal Medicine
Internal Medicine

## 2018-07-24 NOTE — PROGRESS NOTE ADULT - PROBLEM SELECTOR PLAN 1
follow up as out pt
w/u in progress
MRI, Carotid doppler
MRI, carotid doppler and echo
W/U in progress
awaiting LP
cont med
cont med
cont med, neuro to follow up
improving
s/p L/P,
s/p spinal tap  R/O TB s/p bronchoscopy
R/O ? neurosyphilis, neuro follow up, R/O TB AFB x2 negative
resolving

## 2018-07-24 NOTE — PROGRESS NOTE ADULT - SUBJECTIVE AND OBJECTIVE BOX
Patient is a 63y old  Male who presents with a chief complaint of change in mental status (19 Jul 2018 10:17)      INTERVAL HPI/OVERNIGHT EVENTS:  no complaint  MEDICATIONS  (STANDING):  amLODIPine   Tablet 10 milliGRAM(s) Oral daily  aspirin enteric coated 81 milliGRAM(s) Oral daily  cyanocobalamin Injectable 1000 MICROGram(s) IntraMuscular daily  enoxaparin Injectable 40 milliGRAM(s) SubCutaneous every 24 hours  metoprolol tartrate 50 milliGRAM(s) Oral two times a day    MEDICATIONS  (PRN):      Allergies    No Known Allergies    Intolerances        REVIEW OF SYSTEMS:  CONSTITUTIONAL: No fever, weight loss, or fatigue  EYES: No eye pain, visual disturbances, or discharge  ENMT:  No difficulty hearing, tinnitus, vertigo; No sinus or throat pain  NECK: No pain or stiffness  BREASTS: No pain, masses, or nipple discharge  RESPIRATORY: No cough, wheezing, chills or hemoptysis; No shortness of breath  CARDIOVASCULAR: No chest pain, palpitations, dizziness, or leg swelling  GASTROINTESTINAL: No abdominal or epigastric pain. No nausea, vomiting, or hematemesis; No diarrhea or constipation. No melena or hematochezia.  GENITOURINARY: No dysuria, frequency, hematuria, or incontinence  NEUROLOGICAL: No headaches, memory loss, loss of strength, numbness, or tremors  SKIN: No itching, burning, rashes, or lesions   LYMPH NODES: No enlarged glands  ENDOCRINE: No heat or cold intolerance; No hair loss  MUSCULOSKELETAL: No joint pain or swelling; No muscle, back, or extremity pain  PSYCHIATRIC: No depression, anxiety, mood swings, or difficulty sleeping  HEME/LYMPH: No easy bruising, or bleeding gums  ALLERGY AND IMMUNOLOGIC: No hives or eczema    Vital Signs Last 24 Hrs  T(C): 36.9 (24 Jul 2018 05:20), Max: 36.9 (24 Jul 2018 05:20)  T(F): 98.5 (24 Jul 2018 05:20), Max: 98.5 (24 Jul 2018 05:20)  HR: 86 (24 Jul 2018 05:20) (77 - 86)  BP: 142/89 (24 Jul 2018 05:20) (140/90 - 147/98)  BP(mean): --  RR: 18 (24 Jul 2018 05:20) (18 - 18)  SpO2: 96% (24 Jul 2018 05:20) (92% - 96%)    PHYSICAL EXAM:  GENERAL: NAD, well-groomed, well-developed  HEAD:  Atraumatic, Normocephalic  EYES: EOMI, PERRLA, conjunctiva and sclera clear  ENMT: No tonsillar erythema, exudates, or enlargement; Moist mucous membranes, Good dentition, No lesions  NECK: Supple, No JVD, Normal thyroid  NERVOUS SYSTEM:  Alert & Oriented X3, Good concentration; Motor Strength 5/5 B/L upper and lower extremities; DTRs 2+ intact and symmetric  CHEST/LUNG: Clear to percussion bilaterally; No rales, rhonchi, wheezing, or rubs  HEART: Regular rate and rhythm; No murmurs, rubs, or gallops  ABDOMEN: Soft, Nontender, Nondistended; Bowel sounds present  EXTREMITIES:  2+ Peripheral Pulses, No clubbing, cyanosis, or edema  LYMPH: No lymphadenopathy noted  SKIN: No rashes or lesions    LABS:              CAPILLARY BLOOD GLUCOSE        CULTURES:    HEMOGLOBIN A1C:    CHOLESTEROL:        RADIOLOGY & ADDITIONAL TESTS:

## 2018-07-24 NOTE — PROGRESS NOTE ADULT - PROBLEM SELECTOR PLAN 3
cont med
bronchoscopy result pending
R/O TB for persistent RUL infiltrate, s/p bronchoscopy
cont med
resolved
agree for LP r/o neurosyphilis

## 2018-07-24 NOTE — PROGRESS NOTE ADULT - PROBLEM SELECTOR PROBLEM 3
Metabolic encephalopathy
Cerebrovascular accident (CVA), unspecified mechanism
Metabolic encephalopathy
Tuberculosis of lung
Hypertension, unspecified type
Metabolic encephalopathy
Tuberculosis of lung
Dementia without behavioral disturbance, unspecified dementia type
Hypertension, unspecified type

## 2018-07-24 NOTE — PROGRESS NOTE ADULT - PROBLEM SELECTOR PROBLEM 1
Dementia without behavioral disturbance, unspecified dementia type
Dementia without behavioral disturbance, unspecified dementia type
Cerebrovascular accident (CVA), unspecified mechanism
Dementia without behavioral disturbance, unspecified dementia type
Metabolic encephalopathy
Dementia without behavioral disturbance, unspecified dementia type
Hypertension, unspecified type
Metabolic encephalopathy
Metabolic encephalopathy
Hypertension, unspecified type

## 2018-07-25 LAB
NON-GYNECOLOGICAL CYTOLOGY STUDY: SIGNIFICANT CHANGE UP
NON-GYNECOLOGICAL CYTOLOGY STUDY: SIGNIFICANT CHANGE UP

## 2018-07-26 DIAGNOSIS — I63.8 OTHER CEREBRAL INFARCTION: ICD-10-CM

## 2018-07-26 DIAGNOSIS — G32.89 OTHER SPECIFIED DEGENERATIVE DISORDERS OF NERVOUS SYSTEM IN DISEASES CLASSIFIED ELSEWHERE: ICD-10-CM

## 2018-07-26 DIAGNOSIS — G93.41 METABOLIC ENCEPHALOPATHY: ICD-10-CM

## 2018-07-26 DIAGNOSIS — F03.90 UNSPECIFIED DEMENTIA WITHOUT BEHAVIORAL DISTURBANCE: ICD-10-CM

## 2018-07-26 DIAGNOSIS — D86.0 SARCOIDOSIS OF LUNG: ICD-10-CM

## 2018-07-26 DIAGNOSIS — E53.8 DEFICIENCY OF OTHER SPECIFIED B GROUP VITAMINS: ICD-10-CM

## 2018-07-26 DIAGNOSIS — E86.0 DEHYDRATION: ICD-10-CM

## 2018-07-26 DIAGNOSIS — A53.9 SYPHILIS, UNSPECIFIED: ICD-10-CM

## 2018-07-26 DIAGNOSIS — A15.9 RESPIRATORY TUBERCULOSIS UNSPECIFIED: ICD-10-CM

## 2018-07-26 DIAGNOSIS — I10 ESSENTIAL (PRIMARY) HYPERTENSION: ICD-10-CM

## 2018-07-27 LAB — SURGICAL PATHOLOGY FINAL REPORT - CH: SIGNIFICANT CHANGE UP

## 2018-08-03 LAB — NON-GYNECOLOGICAL CYTOLOGY STUDY: SIGNIFICANT CHANGE UP

## 2018-08-16 LAB
CULTURE RESULTS: SIGNIFICANT CHANGE UP
SPECIMEN SOURCE: SIGNIFICANT CHANGE UP

## 2018-08-29 LAB
CULTURE RESULTS: SIGNIFICANT CHANGE UP
NIGHT BLUE STAIN TISS: SIGNIFICANT CHANGE UP
SPECIMEN SOURCE: SIGNIFICANT CHANGE UP

## 2018-09-01 LAB
CULTURE RESULTS: SIGNIFICANT CHANGE UP
CULTURE RESULTS: SIGNIFICANT CHANGE UP
NIGHT BLUE STAIN TISS: SIGNIFICANT CHANGE UP
NIGHT BLUE STAIN TISS: SIGNIFICANT CHANGE UP
SPECIMEN SOURCE: SIGNIFICANT CHANGE UP
SPECIMEN SOURCE: SIGNIFICANT CHANGE UP

## 2019-12-18 ENCOUNTER — INPATIENT (INPATIENT)
Facility: HOSPITAL | Age: 64
LOS: 4 days | Discharge: HOME | End: 2019-12-23
Attending: SURGERY | Admitting: SURGERY
Payer: MEDICAID

## 2019-12-18 VITALS
SYSTOLIC BLOOD PRESSURE: 107 MMHG | OXYGEN SATURATION: 95 % | HEIGHT: 65 IN | RESPIRATION RATE: 16 BRPM | HEART RATE: 131 BPM | DIASTOLIC BLOOD PRESSURE: 63 MMHG | WEIGHT: 154.98 LBS | TEMPERATURE: 99 F

## 2019-12-18 DIAGNOSIS — K35.80 UNSPECIFIED ACUTE APPENDICITIS: ICD-10-CM

## 2019-12-18 DIAGNOSIS — R00.0 TACHYCARDIA, UNSPECIFIED: ICD-10-CM

## 2019-12-18 DIAGNOSIS — I10 ESSENTIAL (PRIMARY) HYPERTENSION: ICD-10-CM

## 2019-12-18 DIAGNOSIS — N17.9 ACUTE KIDNEY FAILURE, UNSPECIFIED: ICD-10-CM

## 2019-12-18 DIAGNOSIS — Z29.9 ENCOUNTER FOR PROPHYLACTIC MEASURES, UNSPECIFIED: ICD-10-CM

## 2019-12-18 LAB
ALBUMIN SERPL ELPH-MCNC: 3.3 G/DL — SIGNIFICANT CHANGE UP (ref 3.3–5)
ALP SERPL-CCNC: 82 U/L — SIGNIFICANT CHANGE UP (ref 40–120)
ALT FLD-CCNC: 38 U/L — SIGNIFICANT CHANGE UP (ref 12–78)
AMYLASE P1 CFR SERPL: 143 U/L — HIGH (ref 25–115)
ANION GAP SERPL CALC-SCNC: 6 MMOL/L — SIGNIFICANT CHANGE UP (ref 5–17)
APPEARANCE UR: ABNORMAL
APTT BLD: 31.4 SEC — SIGNIFICANT CHANGE UP (ref 28.5–37)
AST SERPL-CCNC: 40 U/L — HIGH (ref 15–37)
BACTERIA # UR AUTO: ABNORMAL
BASOPHILS # BLD AUTO: 0 K/UL — SIGNIFICANT CHANGE UP (ref 0–0.2)
BASOPHILS NFR BLD AUTO: 0 % — SIGNIFICANT CHANGE UP (ref 0–2)
BILIRUB SERPL-MCNC: 1.7 MG/DL — HIGH (ref 0.2–1.2)
BILIRUB UR-MCNC: ABNORMAL
BLD GP AB SCN SERPL QL: SIGNIFICANT CHANGE UP
BUN SERPL-MCNC: 36 MG/DL — HIGH (ref 7–23)
CALCIUM SERPL-MCNC: 9.3 MG/DL — SIGNIFICANT CHANGE UP (ref 8.5–10.1)
CHLORIDE SERPL-SCNC: 106 MMOL/L — SIGNIFICANT CHANGE UP (ref 96–108)
CO2 SERPL-SCNC: 26 MMOL/L — SIGNIFICANT CHANGE UP (ref 22–31)
COLOR SPEC: YELLOW — SIGNIFICANT CHANGE UP
COMMENT - URINE: SIGNIFICANT CHANGE UP
CREAT SERPL-MCNC: 2.03 MG/DL — HIGH (ref 0.5–1.3)
DIFF PNL FLD: ABNORMAL
EOSINOPHIL # BLD AUTO: 0 K/UL — SIGNIFICANT CHANGE UP (ref 0–0.5)
EOSINOPHIL NFR BLD AUTO: 0 % — SIGNIFICANT CHANGE UP (ref 0–6)
GLUCOSE SERPL-MCNC: 85 MG/DL — SIGNIFICANT CHANGE UP (ref 70–99)
GLUCOSE UR QL: NEGATIVE MG/DL — SIGNIFICANT CHANGE UP
HCT VFR BLD CALC: 40.9 % — SIGNIFICANT CHANGE UP (ref 39–50)
HGB BLD-MCNC: 13.5 G/DL — SIGNIFICANT CHANGE UP (ref 13–17)
INR BLD: 1.39 RATIO — HIGH (ref 0.88–1.16)
KETONES UR-MCNC: NEGATIVE — SIGNIFICANT CHANGE UP
LACTATE SERPL-SCNC: 1.9 MMOL/L — SIGNIFICANT CHANGE UP (ref 0.7–2)
LEUKOCYTE ESTERASE UR-ACNC: ABNORMAL
LIDOCAIN IGE QN: 204 U/L — SIGNIFICANT CHANGE UP (ref 73–393)
LYMPHOCYTES # BLD AUTO: 0.75 K/UL — LOW (ref 1–3.3)
LYMPHOCYTES # BLD AUTO: 13 % — SIGNIFICANT CHANGE UP (ref 13–44)
MANUAL SMEAR VERIFICATION: YES — SIGNIFICANT CHANGE UP
MCHC RBC-ENTMCNC: 29.5 PG — SIGNIFICANT CHANGE UP (ref 27–34)
MCHC RBC-ENTMCNC: 33 GM/DL — SIGNIFICANT CHANGE UP (ref 32–36)
MCV RBC AUTO: 89.5 FL — SIGNIFICANT CHANGE UP (ref 80–100)
METAMYELOCYTES # FLD: 5 % — HIGH (ref 0–0)
MONOCYTES # BLD AUTO: 0.23 K/UL — SIGNIFICANT CHANGE UP (ref 0–0.9)
MONOCYTES NFR BLD AUTO: 4 % — SIGNIFICANT CHANGE UP (ref 2–14)
NEUTROPHILS # BLD AUTO: 4.49 K/UL — SIGNIFICANT CHANGE UP (ref 1.8–7.4)
NEUTROPHILS NFR BLD AUTO: 50 % — SIGNIFICANT CHANGE UP (ref 43–77)
NEUTS BAND # BLD: 28 % — HIGH (ref 0–8)
NITRITE UR-MCNC: NEGATIVE — SIGNIFICANT CHANGE UP
NRBC # BLD: 0 /100 — SIGNIFICANT CHANGE UP (ref 0–0)
NRBC # BLD: SIGNIFICANT CHANGE UP /100 WBCS (ref 0–0)
PH UR: 5 — SIGNIFICANT CHANGE UP (ref 5–8)
PLAT MORPH BLD: NORMAL — SIGNIFICANT CHANGE UP
PLATELET # BLD AUTO: 168 K/UL — SIGNIFICANT CHANGE UP (ref 150–400)
PLATELET CLUMP BLD QL SMEAR: SLIGHT
POTASSIUM SERPL-MCNC: 3.4 MMOL/L — LOW (ref 3.5–5.3)
POTASSIUM SERPL-SCNC: 3.4 MMOL/L — LOW (ref 3.5–5.3)
PROT SERPL-MCNC: 7.2 GM/DL — SIGNIFICANT CHANGE UP (ref 6–8.3)
PROT UR-MCNC: 100 MG/DL
PROTHROM AB SERPL-ACNC: 15.7 SEC — HIGH (ref 10–12.9)
RBC # BLD: 4.57 M/UL — SIGNIFICANT CHANGE UP (ref 4.2–5.8)
RBC # FLD: 14.3 % — SIGNIFICANT CHANGE UP (ref 10.3–14.5)
RBC BLD AUTO: ABNORMAL
RBC CASTS # UR COMP ASSIST: SIGNIFICANT CHANGE UP /HPF (ref 0–4)
SODIUM SERPL-SCNC: 138 MMOL/L — SIGNIFICANT CHANGE UP (ref 135–145)
SP GR SPEC: 1.02 — SIGNIFICANT CHANGE UP (ref 1.01–1.02)
UROBILINOGEN FLD QL: 1 MG/DL
WBC # BLD: 5.75 K/UL — SIGNIFICANT CHANGE UP (ref 3.8–10.5)
WBC # FLD AUTO: 5.75 K/UL — SIGNIFICANT CHANGE UP (ref 3.8–10.5)
WBC UR QL: SIGNIFICANT CHANGE UP

## 2019-12-18 PROCEDURE — 99285 EMERGENCY DEPT VISIT HI MDM: CPT

## 2019-12-18 PROCEDURE — 74176 CT ABD & PELVIS W/O CONTRAST: CPT | Mod: 26

## 2019-12-18 PROCEDURE — 71045 X-RAY EXAM CHEST 1 VIEW: CPT | Mod: 26

## 2019-12-18 PROCEDURE — 99233 SBSQ HOSP IP/OBS HIGH 50: CPT

## 2019-12-18 PROCEDURE — 88304 TISSUE EXAM BY PATHOLOGIST: CPT | Mod: 26

## 2019-12-18 RX ORDER — SODIUM CHLORIDE 9 MG/ML
1000 INJECTION INTRAMUSCULAR; INTRAVENOUS; SUBCUTANEOUS ONCE
Refills: 0 | Status: COMPLETED | OUTPATIENT
Start: 2019-12-18 | End: 2019-12-18

## 2019-12-18 RX ORDER — HYDROMORPHONE HYDROCHLORIDE 2 MG/ML
0.5 INJECTION INTRAMUSCULAR; INTRAVENOUS; SUBCUTANEOUS
Refills: 0 | Status: DISCONTINUED | OUTPATIENT
Start: 2019-12-18 | End: 2019-12-19

## 2019-12-18 RX ORDER — INFLUENZA VIRUS VACCINE 15; 15; 15; 15 UG/.5ML; UG/.5ML; UG/.5ML; UG/.5ML
0.5 SUSPENSION INTRAMUSCULAR ONCE
Refills: 0 | Status: DISCONTINUED | OUTPATIENT
Start: 2019-12-18 | End: 2019-12-23

## 2019-12-18 RX ORDER — SODIUM CHLORIDE 9 MG/ML
1000 INJECTION, SOLUTION INTRAVENOUS
Refills: 0 | Status: DISCONTINUED | OUTPATIENT
Start: 2019-12-18 | End: 2019-12-18

## 2019-12-18 RX ORDER — SODIUM CHLORIDE 9 MG/ML
1000 INJECTION, SOLUTION INTRAVENOUS
Refills: 0 | Status: DISCONTINUED | OUTPATIENT
Start: 2019-12-18 | End: 2019-12-23

## 2019-12-18 RX ORDER — IOHEXOL 300 MG/ML
30 INJECTION, SOLUTION INTRAVENOUS ONCE
Refills: 0 | Status: COMPLETED | OUTPATIENT
Start: 2019-12-18 | End: 2019-12-18

## 2019-12-18 RX ORDER — HEPARIN SODIUM 5000 [USP'U]/ML
5000 INJECTION INTRAVENOUS; SUBCUTANEOUS EVERY 12 HOURS
Refills: 0 | Status: DISCONTINUED | OUTPATIENT
Start: 2019-12-18 | End: 2019-12-23

## 2019-12-18 RX ORDER — PIPERACILLIN AND TAZOBACTAM 4; .5 G/20ML; G/20ML
3.38 INJECTION, POWDER, LYOPHILIZED, FOR SOLUTION INTRAVENOUS ONCE
Refills: 0 | Status: COMPLETED | OUTPATIENT
Start: 2019-12-18 | End: 2019-12-18

## 2019-12-18 RX ORDER — PIPERACILLIN AND TAZOBACTAM 4; .5 G/20ML; G/20ML
3.38 INJECTION, POWDER, LYOPHILIZED, FOR SOLUTION INTRAVENOUS EVERY 8 HOURS
Refills: 0 | Status: DISCONTINUED | OUTPATIENT
Start: 2019-12-18 | End: 2019-12-19

## 2019-12-18 RX ORDER — METOPROLOL TARTRATE 50 MG
50 TABLET ORAL
Refills: 0 | Status: DISCONTINUED | OUTPATIENT
Start: 2019-12-18 | End: 2019-12-23

## 2019-12-18 RX ORDER — SODIUM CHLORIDE 9 MG/ML
1000 INJECTION, SOLUTION INTRAVENOUS ONCE
Refills: 0 | Status: COMPLETED | OUTPATIENT
Start: 2019-12-18 | End: 2019-12-18

## 2019-12-18 RX ORDER — MORPHINE SULFATE 50 MG/1
2 CAPSULE, EXTENDED RELEASE ORAL EVERY 4 HOURS
Refills: 0 | Status: DISCONTINUED | OUTPATIENT
Start: 2019-12-18 | End: 2019-12-20

## 2019-12-18 RX ORDER — HYDROMORPHONE HYDROCHLORIDE 2 MG/ML
1 INJECTION INTRAMUSCULAR; INTRAVENOUS; SUBCUTANEOUS
Refills: 0 | Status: DISCONTINUED | OUTPATIENT
Start: 2019-12-18 | End: 2019-12-19

## 2019-12-18 RX ORDER — BENZOCAINE AND MENTHOL 5; 1 G/100ML; G/100ML
1 LIQUID ORAL
Refills: 0 | Status: DISCONTINUED | OUTPATIENT
Start: 2019-12-18 | End: 2019-12-23

## 2019-12-18 RX ORDER — ACETAMINOPHEN 500 MG
1000 TABLET ORAL ONCE
Refills: 0 | Status: COMPLETED | OUTPATIENT
Start: 2019-12-18 | End: 2019-12-18

## 2019-12-18 RX ORDER — POTASSIUM CHLORIDE 20 MEQ
20 PACKET (EA) ORAL ONCE
Refills: 0 | Status: COMPLETED | OUTPATIENT
Start: 2019-12-18 | End: 2019-12-18

## 2019-12-18 RX ORDER — ONDANSETRON 8 MG/1
4 TABLET, FILM COATED ORAL EVERY 6 HOURS
Refills: 0 | Status: DISCONTINUED | OUTPATIENT
Start: 2019-12-18 | End: 2019-12-23

## 2019-12-18 RX ORDER — AMLODIPINE BESYLATE 2.5 MG/1
10 TABLET ORAL DAILY
Refills: 0 | Status: DISCONTINUED | OUTPATIENT
Start: 2019-12-18 | End: 2019-12-23

## 2019-12-18 RX ADMIN — PIPERACILLIN AND TAZOBACTAM 25 GRAM(S): 4; .5 INJECTION, POWDER, LYOPHILIZED, FOR SOLUTION INTRAVENOUS at 21:22

## 2019-12-18 RX ADMIN — SODIUM CHLORIDE 125 MILLILITER(S): 9 INJECTION, SOLUTION INTRAVENOUS at 08:38

## 2019-12-18 RX ADMIN — MORPHINE SULFATE 2 MILLIGRAM(S): 50 CAPSULE, EXTENDED RELEASE ORAL at 13:46

## 2019-12-18 RX ADMIN — SODIUM CHLORIDE 1000 MILLILITER(S): 9 INJECTION INTRAMUSCULAR; INTRAVENOUS; SUBCUTANEOUS at 13:53

## 2019-12-18 RX ADMIN — IOHEXOL 30 MILLILITER(S): 300 INJECTION, SOLUTION INTRAVENOUS at 05:35

## 2019-12-18 RX ADMIN — SODIUM CHLORIDE 1000 MILLILITER(S): 9 INJECTION INTRAMUSCULAR; INTRAVENOUS; SUBCUTANEOUS at 08:38

## 2019-12-18 RX ADMIN — Medication 1000 MILLIGRAM(S): at 20:45

## 2019-12-18 RX ADMIN — Medication 400 MILLIGRAM(S): at 20:03

## 2019-12-18 RX ADMIN — PIPERACILLIN AND TAZOBACTAM 200 GRAM(S): 4; .5 INJECTION, POWDER, LYOPHILIZED, FOR SOLUTION INTRAVENOUS at 07:55

## 2019-12-18 RX ADMIN — SODIUM CHLORIDE 1000 MILLILITER(S): 9 INJECTION INTRAMUSCULAR; INTRAVENOUS; SUBCUTANEOUS at 08:41

## 2019-12-18 RX ADMIN — SODIUM CHLORIDE 75 MILLILITER(S): 9 INJECTION, SOLUTION INTRAVENOUS at 20:02

## 2019-12-18 RX ADMIN — MORPHINE SULFATE 2 MILLIGRAM(S): 50 CAPSULE, EXTENDED RELEASE ORAL at 15:45

## 2019-12-18 RX ADMIN — PIPERACILLIN AND TAZOBACTAM 3.38 GRAM(S): 4; .5 INJECTION, POWDER, LYOPHILIZED, FOR SOLUTION INTRAVENOUS at 08:38

## 2019-12-18 RX ADMIN — MORPHINE SULFATE 2 MILLIGRAM(S): 50 CAPSULE, EXTENDED RELEASE ORAL at 11:40

## 2019-12-18 RX ADMIN — SODIUM CHLORIDE 1000 MILLILITER(S): 9 INJECTION, SOLUTION INTRAVENOUS at 22:49

## 2019-12-18 RX ADMIN — PIPERACILLIN AND TAZOBACTAM 25 GRAM(S): 4; .5 INJECTION, POWDER, LYOPHILIZED, FOR SOLUTION INTRAVENOUS at 15:37

## 2019-12-18 RX ADMIN — MORPHINE SULFATE 2 MILLIGRAM(S): 50 CAPSULE, EXTENDED RELEASE ORAL at 15:32

## 2019-12-18 RX ADMIN — Medication 20 MILLIEQUIVALENT(S): at 09:13

## 2019-12-18 RX ADMIN — SODIUM CHLORIDE 125 MILLILITER(S): 9 INJECTION, SOLUTION INTRAVENOUS at 21:22

## 2019-12-18 RX ADMIN — AMLODIPINE BESYLATE 10 MILLIGRAM(S): 2.5 TABLET ORAL at 09:13

## 2019-12-18 RX ADMIN — SODIUM CHLORIDE 1000 MILLILITER(S): 9 INJECTION INTRAMUSCULAR; INTRAVENOUS; SUBCUTANEOUS at 07:55

## 2019-12-18 NOTE — ED ADULT NURSE NOTE - OBJECTIVE STATEMENT
Patient presented in ED with right lower quadrant abdominal pain, 10/10 and weakness since 12/15/19. Now during interview patient denies any pain now.

## 2019-12-18 NOTE — ED PROVIDER NOTE - OBJECTIVE STATEMENT
64 year old male presents today c/o abdominal pain which started on Sunday, he describes pain in the RLQ 64 year old male presents today c/o abdominal pain which started on Sunday, he describes pain in the RLQ which worsened last night and unable to sleep associated with decreased sleep, (-) nausea or vomiting (-) fevers or chills (-) diarrhea

## 2019-12-18 NOTE — ED PROVIDER NOTE - CARE PLAN
Principal Discharge DX:	Abdominal pain Principal Discharge DX:	Abdominal pain  Secondary Diagnosis:	Acute appendicitis

## 2019-12-18 NOTE — CONSULT NOTE ADULT - PROBLEM SELECTOR RECOMMENDATION 9
Sepsis POA  as seen on CT abdomen.   Surgical correction as per surgery   cont w/ analgesics as needed, IVF and IV abx.

## 2019-12-18 NOTE — H&P ADULT - PROBLEM SELECTOR PLAN 3
Continue home meds  Pre op medical consult called; Dr. Nino  Monitor vitals  IV hydration Continue home meds  Pre op medical consult called; Dr. Nino, IM  Monitor vitals  IV hydration

## 2019-12-18 NOTE — H&P ADULT - CONSTITUTIONAL DETAILS
well-developed/well-nourished/well-groomed/no distress well-developed/well-groomed/well-nourished/distress due to pain

## 2019-12-18 NOTE — ED ADULT NURSE NOTE - NSIMPLEMENTINTERV_GEN_ALL_ED
Implemented All Universal Safety Interventions:  [place] to call system. Call bell, personal items and telephone within reach. Instruct patient to call for assistance. Room bathroom lighting operational. Non-slip footwear when patient is off stretcher. Physically safe environment: no spills, clutter or unnecessary equipment. Stretcher in lowest position, wheels locked, appropriate side rails in place.

## 2019-12-18 NOTE — ED ADULT TRIAGE NOTE - CHIEF COMPLAINT QUOTE
Patient BIBA: patient speaks Creole only: [facility] acted as . Patient reports abdominal pain. Denies nausea, vomiting, diarrhea or fever. Rates pain 10/10. Points to RLQ when directed to show were the pain is/

## 2019-12-18 NOTE — H&P ADULT - ATTENDING COMMENTS
I examined patient and Dx and plan discussed with him, via [employer], discussed with his sister, who speak English.  Case discussed luis enrique PENA. Surgical  procedure booked

## 2019-12-18 NOTE — BRIEF OPERATIVE NOTE - NSICDXBRIEFPREOP_GEN_ALL_CORE_FT
PRE-OP DIAGNOSIS:  Acute appendicitis with localized peritonitis 18-Dec-2019 20:04:01  Sonam Pollard

## 2019-12-18 NOTE — PROGRESS NOTE ADULT - SUBJECTIVE AND OBJECTIVE BOX
Post-op check    S/P Laparoscopic Appendectomy POD#0  64 year old Male seen and examined at bedside resting comfortably. No abdominal pain. Denies chest pain, shortness of breath, nausea/ vomiting, and dizziness.     Vital Signs Last 24 Hrs  T(F): 97 (12-18-19 @ 21:15), Max: 100 (12-18-19 @ 07:28)  HR: 102 (12-18-19 @ 21:15)  BP: 130/74 (12-18-19 @ 21:15)  RR: 17 (12-18-19 @ 21:15)  SpO2: 94% (12-18-19 @ 21:15)    GENERAL: Alert, NAD  CHEST/LUNG: Clear to auscultation bilaterally, respirations nonlabored  HEART: S1S2, Regular rate and rhythm  ABDOMEN: Dressings C/D/I. ARI with seropurulent output 130cc since OR. Soft mild distention, Hypoactive bowel sounds, soft, Appropriate incisional tenderness  EXTREMITIES: no calf tenderness, No edema, intermittent compression devices in place bilaterally    Assessment: 63 y/o male with acute gangrenous appendicitis with perforation S/P Laparoscopic Appendectomy POD#0    Plan:  - NPO. IV hydration  - local wound care  - ARI care. Monitor output  - DVT prophylaxis, Incentive Spirometer, OOB, Ambulating, pain control  - Continue Zosyn therapeutically   - Moore  - f/u labs   - Discussed with Dr. Herrera Post-op check    S/P Laparoscopic Appendectomy POD#0  64 year old Male seen and examined at bedside resting comfortably. No abdominal pain. Denies chest pain, shortness of breath, nausea/ vomiting, and dizziness.     Vital Signs Last 24 Hrs  T(F): 97 (12-18-19 @ 21:15), Max: 100 (12-18-19 @ 07:28)  HR: 102 (12-18-19 @ 21:15)  BP: 130/74 (12-18-19 @ 21:15)  RR: 17 (12-18-19 @ 21:15)  SpO2: 94% (12-18-19 @ 21:15)    GENERAL: Alert, NAD  CHEST/LUNG: Clear to auscultation bilaterally, respirations nonlabored  HEART: S1S2, Regular rate and rhythm  ABDOMEN: Dressings C/D/I. ARI with seropurulent output 130cc since OR. Soft mild distention, Hypoactive bowel sounds, soft, Appropriate incisional tenderness  : Moore indwelling with clear yellow urine  EXTREMITIES: no calf tenderness, No edema, intermittent compression devices in place bilaterally    Assessment: 65 y/o male with acute gangrenous appendicitis with perforation S/P Laparoscopic Appendectomy POD#0    Plan:  - NPO. IV hydration  - local wound care  - ARI care. Monitor output  - DVT prophylaxis, Incentive Spirometer, OOB, Ambulating, pain control  - Continue Zosyn therapeutically   - Moore  - f/u labs   - Discussed with Dr. Herrera

## 2019-12-18 NOTE — ED ADULT NURSE NOTE - CHIEF COMPLAINT QUOTE
Patient BIBA: patient speaks Creole only: [facility] acted as . Patient reports abdominal pain. Denies nausea, vomiting, diarrhea or fever. Rates pain 10/10. Points to RLQ when directed to show were the pain is/

## 2019-12-18 NOTE — CONSULT NOTE ADULT - SUBJECTIVE AND OBJECTIVE BOX
Patient is a 64y old  Male who presents with a chief complaint of acute appendicitis (18 Dec 2019 07:47)      INTERVAL HPI/ OVERNIGHT EVENTS: Pt was seen and examined at bedside today, pt admits that abdominal pain is improving, denies cp, palpitations and sob.      MEDICATIONS  (STANDING):  amLODIPine   Tablet 10 milliGRAM(s) Oral daily  heparin  Injectable 5000 Unit(s) SubCutaneous every 12 hours  lactated ringers. 1000 milliLiter(s) (125 mL/Hr) IV Continuous <Continuous>  metoprolol tartrate 50 milliGRAM(s) Oral two times a day  piperacillin/tazobactam IVPB.. 3.375 Gram(s) IV Intermittent Once    MEDICATIONS  (PRN):  morphine  - Injectable 2 milliGRAM(s) IV Push every 4 hours PRN Moderate Pain (4 - 6)  ondansetron Injectable 4 milliGRAM(s) IV Push every 6 hours PRN Nausea and/or Vomiting      Allergies    No Known Allergies    Intolerances        REVIEW OF SYSTEMS:    Unable to examine due to [ ] Encephalopathy [ ] Advanced Dementia [ ] Expressive Aphasia [ ] Non-verbal patient    CONSTITUTIONAL: No fever, NO generalized weakness/Fatigue, No weight loss  EYES: No eye pain, visual disturbances, or discharge  ENMT:  No difficulty hearing, tinnitus, vertigo; No sinus or throat pain  NECK: No pain or stiffness  RESPIRATORY: No shortness of breath,  cough, wheezing, sputum or hemoptysis   CARDIOVASCULAR: No chest pain, palpitations, or leg swelling  GASTROINTESTINAL: Positive abdominal pain. No nausea, vomiting, diarrhea or constipation. No melena or hematochezia.  GENITOURINARY: No dysuria, frequency, hematuria, or incontinence  NEUROLOGICAL: No headaches, Dizziness, memory loss, loss of strength, numbness, or tremors  SKIN: No itching, burning, rashes, or lesions   MUSCULOSKELETAL: No joint pain or swelling; No muscle, back, or extremity pain  PSYCHIATRIC: No depression, anxiety, mood swings, or difficulty sleeping  HEME/LYMPH: No easy bruising, or bleeding gums      Vital Signs Last 24 Hrs  T(C): 37.3 (18 Dec 2019 09:32), Max: 37.8 (18 Dec 2019 07:28)  T(F): 99.1 (18 Dec 2019 09:32), Max: 100 (18 Dec 2019 07:28)  HR: 105 (18 Dec 2019 09:32) (105 - 131)  BP: 109/65 (18 Dec 2019 09:32) (107/63 - 119/73)  BP(mean): --  RR: 24 (18 Dec 2019 09:32) (16 - 24)  SpO2: 98% (18 Dec 2019 09:32) (95% - 98%)    PHYSICAL EXAM:  GENERAL: NAD, well-developed, well-groomed  HEAD:  Atraumatic, Normocephalic  EYES: conjunctiva and sclera clear  ENMT: Moist mucous membranes  NECK: Supple, No JVD, Normal thyroid  CHEST/LUNG: Clear to Auscultation bilaterally; No rales, rhonchi, wheezing, or rubs  HEART: Regular rate and rhythm; No murmurs, rubs, or gallops  ABDOMEN: Soft, RLQ tenderness, Nondistended; Bowel sounds present  EXTREMITIES:  2+ Peripheral Pulses, No clubbing, cyanosis, or edema  SKIN: No rashes or lesions  NERVOUS SYSTEM:  Alert & Oriented X3, Good concentration; Motor Strength 5/5 B/L upper and lower extremities    LABS:                        13.5   5.75  )-----------( 168      ( 18 Dec 2019 05:25 )             40.9     12-18    138  |  106  |  36<H>  ----------------------------<  85  3.4<L>   |  26  |  2.03<H>    Ca    9.3      18 Dec 2019 05:25    TPro  7.2  /  Alb  3.3  /  TBili  1.7<H>  /  DBili  x   /  AST  40<H>  /  ALT  38  /  AlkPhos  82  12-18    PT/INR - ( 18 Dec 2019 09:17 )   PT: 15.7 sec;   INR: 1.39 ratio         PTT - ( 18 Dec 2019 09:17 )  PTT:31.4 sec  Urinalysis Basic - ( 18 Dec 2019 05:25 )    Color: Yellow / Appearance: very cloudy / S.025 / pH: x  Gluc: x / Ketone: Negative  / Bili: Small / Urobili: 1 mg/dL   Blood: x / Protein: 100 mg/dL / Nitrite: Negative   Leuk Esterase: Trace / RBC: 0-2 /HPF / WBC 3-5   Sq Epi: x / Non Sq Epi: x / Bacteria: Moderate      CAPILLARY BLOOD GLUCOSE              RADIOLOGY & ADDITIONAL TESTS:          Imaging Personally Reviewed:  [ ] YES  [ ] NO    Consultant(s) Notes Reviewed:  [ ] YES  [ ] NO    Care Discussed with Consultants/Other Providers [x ] YES  [ ] NO

## 2019-12-18 NOTE — H&P ADULT - GASTROINTESTINAL DETAILS
soft/no rigidity/bowel sounds normal/no masses palpable/no distention/no guarding no masses palpable/bowel sounds normal/soft/no distention/no rigidity

## 2019-12-18 NOTE — H&P ADULT - PROBLEM SELECTOR PLAN 1
Admit  Pre op for OR; NPO, labs  IV antibiotics  IV hydration  Pain management PRN  Antiemetics PRN   Case booked with OR   Discussed with Dr. Herrera

## 2019-12-18 NOTE — H&P ADULT - NSHPLABSRESULTS_GEN_ALL_CORE
< from: CT Abdomen and Pelvis w/ Oral Cont (12.18.19 @ 06:58) >    EXAM:  CT ABDOMEN AND PELVIS OC                            PROCEDURE DATE:  12/18/2019          INTERPRETATION:  CLINICAL INFORMATION: 64 years old. Male. rlq pain  r/o   appendicitis.    COMPARISON: None.    PROCEDURE:   CT of the Abdomen and Pelvis was performed without intravenous contrast.   Intravenous contrast: None.  Oral contrast: positive contrast was administered.  Sagittal and coronal reformats were performed.    FINDINGS:    LOWER CHEST: Mild bibasilar atelectasis. Coronary artery calcifications.   Mild bilateral gynecomastia.    LIVER: Within normal limits.  BILE DUCTS: Normal caliber.  GALLBLADDER: Within normal limits.  SPLEEN: Within normal limits.  PANCREAS: Within normal limits.  ADRENALS: Within normal limits.  KIDNEYS/URETERS: There are 2 nonobstructive calculi in the right kidney   measuring 2 mm each. No hydronephrosis.    BLADDER: No radiodense debris.  REPRODUCTIVE ORGANS: Prostate is not enlarged.    BOWEL: No bowel obstruction. Appendix is enlarged, measuring up to 1.6 cm   in diameter, with marked surrounding fat stranding. Oral contrast has   reached the mid small bowel at the time of imaging.  PERITONEUM: Trace ascites. No free air.  VESSELS: Normal caliber abdominal aorta.  RETROPERITONEUM/LYMPH NODES: No lymphadenopathy.    ABDOMINAL WALL: Atherosclerotic changes.  BONES: Degenerative changes in the spine.    IMPRESSION:     Acute appendicitis.    Findings were discussed with Dr. Acevedo on 12/18/2019 7:09 AM with   readback.                    BONILLA DASILVA MD., ATTENDING RADIOLOGIST  This document has been electronically signed. Dec 18 2019  7:11AM          < end of copied text >

## 2019-12-18 NOTE — H&P ADULT - HISTORY OF PRESENT ILLNESS
Patient is a 64 year old male with PMH HTN, HLD who presents to the ER c/o severe, intermittent, 10/10 RLQ pain x 3 days. Admits to associated anorexia. Reports occasional relief from pain when laying still. States pain is exacerbated with movement. Denies fever, chills, chest pain, sob, dysuria, change in bowel habits. Patient is a 64 year old male with PMH HTN, HLD who presents to the Emergency Room  c/o severe, intermittent, 10/10 RLQ pain starting 3 days ago, getting worse last night. Admits to associated anorexia. Reports occasional relief from pain when laying still. States pain is exacerbated with movement. Denies fever, chills, chest pain, sob, dysuria, change in bowel habits. As per patient has BPH as per Urologist, who recommended Surgical intervention,  but he declined.  ER CT scan of abdomen  revealed Acute Appendicitis. Blood tests revealed elevated Cr. Patient don't  Know if suffers from Renal disfunction

## 2019-12-18 NOTE — BRIEF OPERATIVE NOTE - NSICDXBRIEFPROCEDURE_GEN_ALL_CORE_FT
PROCEDURES:  Incision and drainage, abscess, appendix 18-Dec-2019 20:03:29  Sonam Pollard  Laparoscopic appendectomy 18-Dec-2019 20:02:20  Sonam Pollard PROCEDURES:  Laparoscopic drainage of peritoneal abscess 20-Dec-2019 14:38:17  Gerardo Herrera  Laparoscopic appendectomy 18-Dec-2019 20:02:20  Sonam Pollard

## 2019-12-18 NOTE — BRIEF OPERATIVE NOTE - NSICDXBRIEFPOSTOP_GEN_ALL_CORE_FT
POST-OP DIAGNOSIS:  Acute appendicitis with perforation, localized peritonitis, and gangrene 18-Dec-2019 20:04:40  Sonam Pollard R POST-OP DIAGNOSIS:  Acute appendicitis with perforation and peritoneal abscess 20-Dec-2019 14:33:06  Gerardo Herrera

## 2019-12-19 LAB
ANION GAP SERPL CALC-SCNC: 3 MMOL/L — LOW (ref 5–17)
BUN SERPL-MCNC: 24 MG/DL — HIGH (ref 7–23)
CALCIUM SERPL-MCNC: 8.8 MG/DL — SIGNIFICANT CHANGE UP (ref 8.5–10.1)
CHLORIDE SERPL-SCNC: 111 MMOL/L — HIGH (ref 96–108)
CO2 SERPL-SCNC: 28 MMOL/L — SIGNIFICANT CHANGE UP (ref 22–31)
CREAT SERPL-MCNC: 1.36 MG/DL — HIGH (ref 0.5–1.3)
CULTURE RESULTS: SIGNIFICANT CHANGE UP
GLUCOSE SERPL-MCNC: 101 MG/DL — HIGH (ref 70–99)
GRAM STN FLD: SIGNIFICANT CHANGE UP
HCT VFR BLD CALC: 37.9 % — LOW (ref 39–50)
HCV AB S/CO SERPL IA: 0.11 S/CO — SIGNIFICANT CHANGE UP (ref 0–0.99)
HCV AB SERPL-IMP: SIGNIFICANT CHANGE UP
HGB BLD-MCNC: 12.7 G/DL — LOW (ref 13–17)
MAGNESIUM SERPL-MCNC: 2.1 MG/DL — SIGNIFICANT CHANGE UP (ref 1.6–2.6)
MCHC RBC-ENTMCNC: 29.5 PG — SIGNIFICANT CHANGE UP (ref 27–34)
MCHC RBC-ENTMCNC: 33.5 GM/DL — SIGNIFICANT CHANGE UP (ref 32–36)
MCV RBC AUTO: 87.9 FL — SIGNIFICANT CHANGE UP (ref 80–100)
NRBC # BLD: 0 /100 WBCS — SIGNIFICANT CHANGE UP (ref 0–0)
PHOSPHATE SERPL-MCNC: 2.3 MG/DL — LOW (ref 2.5–4.5)
PLATELET # BLD AUTO: 181 K/UL — SIGNIFICANT CHANGE UP (ref 150–400)
POTASSIUM SERPL-MCNC: 3.5 MMOL/L — SIGNIFICANT CHANGE UP (ref 3.5–5.3)
POTASSIUM SERPL-SCNC: 3.5 MMOL/L — SIGNIFICANT CHANGE UP (ref 3.5–5.3)
RBC # BLD: 4.31 M/UL — SIGNIFICANT CHANGE UP (ref 4.2–5.8)
RBC # FLD: 14.8 % — HIGH (ref 10.3–14.5)
SODIUM SERPL-SCNC: 142 MMOL/L — SIGNIFICANT CHANGE UP (ref 135–145)
SPECIMEN SOURCE: SIGNIFICANT CHANGE UP
SPECIMEN SOURCE: SIGNIFICANT CHANGE UP
WBC # BLD: 13.38 K/UL — HIGH (ref 3.8–10.5)
WBC # FLD AUTO: 13.38 K/UL — HIGH (ref 3.8–10.5)

## 2019-12-19 PROCEDURE — 99233 SBSQ HOSP IP/OBS HIGH 50: CPT

## 2019-12-19 PROCEDURE — 93010 ELECTROCARDIOGRAM REPORT: CPT

## 2019-12-19 RX ORDER — POTASSIUM PHOSPHATE, MONOBASIC POTASSIUM PHOSPHATE, DIBASIC 236; 224 MG/ML; MG/ML
15 INJECTION, SOLUTION INTRAVENOUS ONCE
Refills: 0 | Status: COMPLETED | OUTPATIENT
Start: 2019-12-19 | End: 2019-12-19

## 2019-12-19 RX ORDER — ACETAMINOPHEN 500 MG
1000 TABLET ORAL ONCE
Refills: 0 | Status: COMPLETED | OUTPATIENT
Start: 2019-12-19 | End: 2019-12-20

## 2019-12-19 RX ORDER — PIPERACILLIN AND TAZOBACTAM 4; .5 G/20ML; G/20ML
3.38 INJECTION, POWDER, LYOPHILIZED, FOR SOLUTION INTRAVENOUS EVERY 8 HOURS
Refills: 0 | Status: DISCONTINUED | OUTPATIENT
Start: 2019-12-19 | End: 2019-12-20

## 2019-12-19 RX ORDER — METOCLOPRAMIDE HCL 10 MG
10 TABLET ORAL THREE TIMES A DAY
Refills: 0 | Status: DISCONTINUED | OUTPATIENT
Start: 2019-12-19 | End: 2019-12-20

## 2019-12-19 RX ADMIN — MORPHINE SULFATE 2 MILLIGRAM(S): 50 CAPSULE, EXTENDED RELEASE ORAL at 20:40

## 2019-12-19 RX ADMIN — MORPHINE SULFATE 2 MILLIGRAM(S): 50 CAPSULE, EXTENDED RELEASE ORAL at 11:35

## 2019-12-19 RX ADMIN — PIPERACILLIN AND TAZOBACTAM 25 GRAM(S): 4; .5 INJECTION, POWDER, LYOPHILIZED, FOR SOLUTION INTRAVENOUS at 13:22

## 2019-12-19 RX ADMIN — PIPERACILLIN AND TAZOBACTAM 25 GRAM(S): 4; .5 INJECTION, POWDER, LYOPHILIZED, FOR SOLUTION INTRAVENOUS at 21:29

## 2019-12-19 RX ADMIN — SODIUM CHLORIDE 125 MILLILITER(S): 9 INJECTION, SOLUTION INTRAVENOUS at 05:26

## 2019-12-19 RX ADMIN — Medication 50 MILLIGRAM(S): at 17:05

## 2019-12-19 RX ADMIN — MORPHINE SULFATE 2 MILLIGRAM(S): 50 CAPSULE, EXTENDED RELEASE ORAL at 11:23

## 2019-12-19 RX ADMIN — Medication 50 MILLIGRAM(S): at 05:26

## 2019-12-19 RX ADMIN — PIPERACILLIN AND TAZOBACTAM 25 GRAM(S): 4; .5 INJECTION, POWDER, LYOPHILIZED, FOR SOLUTION INTRAVENOUS at 05:25

## 2019-12-19 RX ADMIN — AMLODIPINE BESYLATE 10 MILLIGRAM(S): 2.5 TABLET ORAL at 05:26

## 2019-12-19 RX ADMIN — HEPARIN SODIUM 5000 UNIT(S): 5000 INJECTION INTRAVENOUS; SUBCUTANEOUS at 05:26

## 2019-12-19 RX ADMIN — HEPARIN SODIUM 5000 UNIT(S): 5000 INJECTION INTRAVENOUS; SUBCUTANEOUS at 17:05

## 2019-12-19 RX ADMIN — MORPHINE SULFATE 2 MILLIGRAM(S): 50 CAPSULE, EXTENDED RELEASE ORAL at 20:23

## 2019-12-19 RX ADMIN — POTASSIUM PHOSPHATE, MONOBASIC POTASSIUM PHOSPHATE, DIBASIC 62.5 MILLIMOLE(S): 236; 224 INJECTION, SOLUTION INTRAVENOUS at 10:15

## 2019-12-19 RX ADMIN — Medication 10 MILLIGRAM(S): at 21:28

## 2019-12-19 NOTE — CONSULT NOTE ADULT - PROBLEM SELECTOR RECOMMENDATION 9
Sepsis POA; resolved   as seen on CT abdomen.   POD 1 laparoscopic appendectomy   Leukocytosis: most likely reactive   post op incentive spirometry, IVf and IV abx,   mgmt as per surgery

## 2019-12-19 NOTE — CONSULT NOTE ADULT - SUBJECTIVE AND OBJECTIVE BOX
Patient is a 64y old  Male who presents with a chief complaint of acute appendicitis, Abdominal pain, + CT scan (19 Dec 2019 05:32)      INTERVAL HPI/ OVERNIGHT EVENTS: Pt was seen and examined at bedside today, No significant overnight events, pt admits that initial abdominal pain has resolved, has some surgical wound tenderness otherwise no complaints      MEDICATIONS  (STANDING):  amLODIPine   Tablet 10 milliGRAM(s) Oral daily  heparin  Injectable 5000 Unit(s) SubCutaneous every 12 hours  influenza   Vaccine 0.5 milliLiter(s) IntraMuscular once  lactated ringers. 1000 milliLiter(s) (125 mL/Hr) IV Continuous <Continuous>  metoprolol tartrate 50 milliGRAM(s) Oral two times a day  piperacillin/tazobactam IVPB.. 3.375 Gram(s) IV Intermittent every 8 hours    MEDICATIONS  (PRN):  benzocaine 15 mG/menthol 3.6 mG (Sugar-Free) Lozenge 1 Lozenge Oral every 3 hours PRN Sore Throat  morphine  - Injectable 2 milliGRAM(s) IV Push every 4 hours PRN Moderate Pain (4 - 6)  ondansetron Injectable 4 milliGRAM(s) IV Push every 6 hours PRN Nausea and/or Vomiting      Allergies    No Known Allergies    Intolerances        REVIEW OF SYSTEMS:    Unable to examine due to [ ] Encephalopathy [ ] Advanced Dementia [ ] Expressive Aphasia [ ] Non-verbal patient    CONSTITUTIONAL: No fever, NO generalized weakness/Fatigue, No weight loss  EYES: No eye pain, visual disturbances, or discharge  ENMT:  No difficulty hearing, tinnitus, vertigo; No sinus or throat pain  NECK: No pain or stiffness  RESPIRATORY: No shortness of breath,  cough, wheezing, sputum or hemoptysis   CARDIOVASCULAR: No chest pain, palpitations, or leg swelling  GASTROINTESTINAL: No abdominal pain. No nausea, vomiting, diarrhea or constipation. No melena or hematochezia.  GENITOURINARY: No dysuria, frequency, hematuria, or incontinence  NEUROLOGICAL: No headaches, Dizziness, memory loss, loss of strength, numbness, or tremors  SKIN: surgical wound pain in abdomen   MUSCULOSKELETAL: No joint pain or swelling; No muscle, back, or extremity pain  PSYCHIATRIC: No depression, anxiety, mood swings, or difficulty sleeping  HEME/LYMPH: No easy bruising, or bleeding gums      Vital Signs Last 24 Hrs  T(C): 37.2 (19 Dec 2019 17:30), Max: 37.2 (19 Dec 2019 17:30)  T(F): 98.9 (19 Dec 2019 17:30), Max: 98.9 (19 Dec 2019 17:30)  HR: 99 (19 Dec 2019 17:30) (92 - 115)  BP: 143/79 (19 Dec 2019 17:30) (106/73 - 143/79)  BP(mean): --  RR: 17 (19 Dec 2019 17:30) (13 - 19)  SpO2: 93% (19 Dec 2019 17:30) (92% - 100%)    PHYSICAL EXAM:  GENERAL: NAD, well-developed, well-groomed  HEAD:  Atraumatic, Normocephalic  EYES: conjunctiva and sclera clear  ENMT: Moist mucous membranes  NECK: Supple, No JVD, Normal thyroid  CHEST/LUNG: Clear to Auscultation bilaterally; No rales, rhonchi, wheezing, or rubs  HEART: Regular rate and rhythm; No murmurs, rubs, or gallops  ABDOMEN: Soft, Nontender, Nondistended; Bowel sounds present  EXTREMITIES:  2+ Peripheral Pulses, No clubbing, cyanosis, or edema  SKIN: laparoscopic surgical wounds on abdomen   NERVOUS SYSTEM:  Alert & Oriented X3, Good concentration; Motor Strength 5/5 B/L upper and lower extremities    LABS:                        12.7   13.38 )-----------( 181      ( 19 Dec 2019 07:16 )             37.9         142  |  111<H>  |  24<H>  ----------------------------<  101<H>  3.5   |  28  |  1.36<H>    Ca    8.8      19 Dec 2019 07:16  Phos  2.3       Mg     2.1         TPro  7.2  /  Alb  3.3  /  TBili  1.7<H>  /  DBili  x   /  AST  40<H>  /  ALT  38  /  AlkPhos  82      PT/INR - ( 18 Dec 2019 09:17 )   PT: 15.7 sec;   INR: 1.39 ratio         PTT - ( 18 Dec 2019 09:17 )  PTT:31.4 sec  Urinalysis Basic - ( 18 Dec 2019 05:25 )    Color: Yellow / Appearance: very cloudy / S.025 / pH: x  Gluc: x / Ketone: Negative  / Bili: Small / Urobili: 1 mg/dL   Blood: x / Protein: 100 mg/dL / Nitrite: Negative   Leuk Esterase: Trace / RBC: 0-2 /HPF / WBC 3-5   Sq Epi: x / Non Sq Epi: x / Bacteria: Moderate      CAPILLARY BLOOD GLUCOSE            Culture - Urine (collected 19)  Source: .Urine Clean Catch (Midstream)  Final Report (19):    <10,000 CFU/mL Normal Urogenital Iqra        RADIOLOGY & ADDITIONAL TESTS:          Imaging Personally Reviewed:  [ ] YES  [ ] NO    Consultant(s) Notes Reviewed:  [ ] YES  [ ] NO    Care Discussed with Consultants/Other Providers [x ] YES  [ ] NO

## 2019-12-19 NOTE — PROGRESS NOTE ADULT - SUBJECTIVE AND OBJECTIVE BOX
SURGERY PROGRESS HPI:  Pt seen and examined at bedside. Denies pain or complaints. No acute events overnight. Pt denies nausea and vomiting. No BM/flatus. Pt denies chest pain, SOB, dizziness, fever, chills.     Vital Signs Last 24 Hrs  T(C): 36.6 (19 Dec 2019 00:15), Max: 37.8 (18 Dec 2019 07:28)  T(F): 97.8 (19 Dec 2019 00:15), Max: 100 (18 Dec 2019 07:28)  HR: 102 (19 Dec 2019 04:15) (99 - 120)  BP: 141/88 (19 Dec 2019 04:15) (106/73 - 145/89)  BP(mean): --  RR: 18 (19 Dec 2019 04:15) (13 - 24)  SpO2: 96% (19 Dec 2019 04:15) (93% - 100%)    PE:  GENERAL: Alert, NAD  CHEST/LUNG: Clear to auscultation bilaterally, respirations nonlabored  HEART: S1S2, Regular rate and rhythm  ABDOMEN: Dressings C/D/I. ARI with seropurulent output. Soft mild distention, Hypoactive bowel sounds, soft, Appropriate incisional tenderness  : Moore indwelling with clear yellow urine  EXTREMITIES: no calf tenderness, No edema, intermittent compression devices in place bilaterally    I&O's Detail    18 Dec 2019 07:01  -  19 Dec 2019 05:33  --------------------------------------------------------  IN:    lactated ringers.: 150 mL    Sodium Chloride 0.9% IV Bolus: 1000 mL    Solution: 100 mL  Total IN: 1250 mL    OUT:    Bulb: 130 mL    Indwelling Catheter - Urethral: 250 mL  Total OUT: 380 mL    Total NET: 870 mL          LABS:                        13.5   5.75  )-----------( 168      ( 18 Dec 2019 05:25 )             40.9     12    138  |  106  |  36<H>  ----------------------------<  85  3.4<L>   |  26  |  2.03<H>    Ca    9.3      18 Dec 2019 05:25    TPro  7.2  /  Alb  3.3  /  TBili  1.7<H>  /  DBili  x   /  AST  40<H>  /  ALT  38  /  AlkPhos  82  12-18    PT/INR - ( 18 Dec 2019 09:17 )   PT: 15.7 sec;   INR: 1.39 ratio         PTT - ( 18 Dec 2019 09:17 )  PTT:31.4 sec  Urinalysis Basic - ( 18 Dec 2019 05:25 )    Color: Yellow / Appearance: very cloudy / S.025 / pH: x  Gluc: x / Ketone: Negative  / Bili: Small / Urobili: 1 mg/dL   Blood: x / Protein: 100 mg/dL / Nitrite: Negative   Leuk Esterase: Trace / RBC: 0-2 /HPF / WBC 3-5   Sq Epi: x / Non Sq Epi: x / Bacteria: Moderate        Assessment: 63 y/o male with acute gangrenous appendicitis with perforation S/P Laparoscopic Appendectomy POD#1    Plan:  - NPO. IV hydration  - local wound care  - ARI care. Monitor output  - DVT prophylaxis, Incentive Spirometer, OOB, Ambulating, pain control  - Continue Zosyn therapeutically   - D/c Moore  - f/u labs   - Discussed with Dr. Herrera last night SURGERY PROGRESS HPI:  Pt seen and examined at bedside. Denies pain or complaints. No acute events overnight. Pt denies nausea and vomiting. No BM/flatus. Pt denies chest pain, SOB, dizziness, fever, chills.     Vital Signs Last 24 Hrs  T(C): 36.6 (19 Dec 2019 00:15), Max: 37.8 (18 Dec 2019 07:28)  T(F): 97.8 (19 Dec 2019 00:15), Max: 100 (18 Dec 2019 07:28)  HR: 102 (19 Dec 2019 04:15) (99 - 120)  BP: 141/88 (19 Dec 2019 04:15) (106/73 - 145/89)  BP(mean): --  RR: 18 (19 Dec 2019 04:15) (13 - 24)  SpO2: 96% (19 Dec 2019 04:15) (93% - 100%)    PE:  GENERAL: Alert, NAD  CHEST/LUNG: Clear to auscultation bilaterally, respirations nonlabored  HEART: S1S2, Regular rate and rhythm  ABDOMEN: Dressings C/D/I. ARI with seropurulent output. Soft mild distention, Hypoactive bowel sounds, soft, Appropriate incisional tenderness  : Moore indwelling with clear yellow urine 1100cc/12hrs  EXTREMITIES: no calf tenderness, No edema, intermittent compression devices in place bilaterally    I&O's Detail    18 Dec 2019 07:01  -  19 Dec 2019 05:33  --------------------------------------------------------  IN:    lactated ringers.: 150 mL    Sodium Chloride 0.9% IV Bolus: 1000 mL    Solution: 100 mL  Total IN: 1250 mL    OUT:    Bulb: 130 mL    Indwelling Catheter - Urethral: 250 mL  Total OUT: 380 mL    Total NET: 870 mL          LABS:                        13.5   5.75  )-----------( 168      ( 18 Dec 2019 05:25 )             40.9     12    138  |  106  |  36<H>  ----------------------------<  85  3.4<L>   |  26  |  2.03<H>    Ca    9.3      18 Dec 2019 05:25    TPro  7.2  /  Alb  3.3  /  TBili  1.7<H>  /  DBili  x   /  AST  40<H>  /  ALT  38  /  AlkPhos  82  12-18    PT/INR - ( 18 Dec 2019 09:17 )   PT: 15.7 sec;   INR: 1.39 ratio         PTT - ( 18 Dec 2019 09:17 )  PTT:31.4 sec  Urinalysis Basic - ( 18 Dec 2019 05:25 )    Color: Yellow / Appearance: very cloudy / S.025 / pH: x  Gluc: x / Ketone: Negative  / Bili: Small / Urobili: 1 mg/dL   Blood: x / Protein: 100 mg/dL / Nitrite: Negative   Leuk Esterase: Trace / RBC: 0-2 /HPF / WBC 3-5   Sq Epi: x / Non Sq Epi: x / Bacteria: Moderate        Assessment: 63 y/o male with acute gangrenous appendicitis with perforation S/P Laparoscopic Appendectomy POD#1    Plan:  - NPO. IV hydration  - local wound care  - ARI care. Monitor output  - DVT prophylaxis, Incentive Spirometer, OOB, Ambulating, pain control  - Continue Zosyn therapeutically   - D/c Moore  - f/u labs   - Discussed with Dr. Herrera last night SURGERY PROGRESS HPI:  S/P Laparoscopic Appendectomy POD#1  Pt seen and examined at bedside. Denies pain or complaints. No acute events overnight. Pt denies nausea and vomiting. No BM/flatus. Pt denies chest pain, SOB, dizziness, fever, chills.     Vital Signs Last 24 Hrs  T(C): 36.6 (19 Dec 2019 00:15), Max: 37.8 (18 Dec 2019 07:28)  T(F): 97.8 (19 Dec 2019 00:15), Max: 100 (18 Dec 2019 07:28)  HR: 102 (19 Dec 2019 04:15) (99 - 120)  BP: 141/88 (19 Dec 2019 04:15) (106/73 - 145/89)  BP(mean): --  RR: 18 (19 Dec 2019 04:15) (13 - 24)  SpO2: 96% (19 Dec 2019 04:15) (93% - 100%)    PE:  GENERAL: Alert, NAD  CHEST/LUNG: Clear to auscultation bilaterally, respirations nonlabored  HEART: S1S2, Regular rate and rhythm  ABDOMEN: Dressings C/D/I. ARI with seropurulent output. Soft mild distention, Hypoactive bowel sounds, soft, Appropriate incisional tenderness  : Moore indwelling with clear yellow urine 1100cc/12hrs  EXTREMITIES: no calf tenderness, No edema, intermittent compression devices in place bilaterally    I&O's Detail    18 Dec 2019 07:01  -  19 Dec 2019 05:33  --------------------------------------------------------  IN:    lactated ringers.: 150 mL    Sodium Chloride 0.9% IV Bolus: 1000 mL    Solution: 100 mL  Total IN: 1250 mL    OUT:    Bulb: 130 mL    Indwelling Catheter - Urethral: 250 mL  Total OUT: 380 mL    Total NET: 870 mL          LABS:                        13.5   5.75  )-----------( 168      ( 18 Dec 2019 05:25 )             40.9     12-18    138  |  106  |  36<H>  ----------------------------<  85  3.4<L>   |  26  |  2.03<H>    Ca    9.3      18 Dec 2019 05:25    TPro  7.2  /  Alb  3.3  /  TBili  1.7<H>  /  DBili  x   /  AST  40<H>  /  ALT  38  /  AlkPhos  82  12-18    PT/INR - ( 18 Dec 2019 09:17 )   PT: 15.7 sec;   INR: 1.39 ratio         PTT - ( 18 Dec 2019 09:17 )  PTT:31.4 sec  Urinalysis Basic - ( 18 Dec 2019 05:25 )    Color: Yellow / Appearance: very cloudy / S.025 / pH: x  Gluc: x / Ketone: Negative  / Bili: Small / Urobili: 1 mg/dL   Blood: x / Protein: 100 mg/dL / Nitrite: Negative   Leuk Esterase: Trace / RBC: 0-2 /HPF / WBC 3-5   Sq Epi: x / Non Sq Epi: x / Bacteria: Moderate        Assessment: 63 y/o male with acute gangrenous appendicitis with perforation S/P Laparoscopic Appendectomy POD#1    Plan:  - NPO. IV hydration  - local wound care  - ARI care. Monitor output  - DVT prophylaxis, Incentive Spirometer, OOB, Ambulating, pain control  - Continue Zosyn therapeutically   - D/c Moore  - f/u labs   - Discussed with Dr. Herrera last night

## 2019-12-20 DIAGNOSIS — E87.8 OTHER DISORDERS OF ELECTROLYTE AND FLUID BALANCE, NOT ELSEWHERE CLASSIFIED: ICD-10-CM

## 2019-12-20 LAB
ANION GAP SERPL CALC-SCNC: 11 MMOL/L — SIGNIFICANT CHANGE UP (ref 5–17)
BASOPHILS # BLD AUTO: 0.02 K/UL — SIGNIFICANT CHANGE UP (ref 0–0.2)
BASOPHILS NFR BLD AUTO: 0.1 % — SIGNIFICANT CHANGE UP (ref 0–2)
BUN SERPL-MCNC: 21 MG/DL — SIGNIFICANT CHANGE UP (ref 7–23)
CALCIUM SERPL-MCNC: 9.3 MG/DL — SIGNIFICANT CHANGE UP (ref 8.5–10.1)
CHLORIDE SERPL-SCNC: 107 MMOL/L — SIGNIFICANT CHANGE UP (ref 96–108)
CO2 SERPL-SCNC: 23 MMOL/L — SIGNIFICANT CHANGE UP (ref 22–31)
CREAT SERPL-MCNC: 1.29 MG/DL — SIGNIFICANT CHANGE UP (ref 0.5–1.3)
EOSINOPHIL # BLD AUTO: 0.23 K/UL — SIGNIFICANT CHANGE UP (ref 0–0.5)
EOSINOPHIL NFR BLD AUTO: 1.6 % — SIGNIFICANT CHANGE UP (ref 0–6)
GLUCOSE SERPL-MCNC: 65 MG/DL — LOW (ref 70–99)
HCT VFR BLD CALC: 42.9 % — SIGNIFICANT CHANGE UP (ref 39–50)
HGB BLD-MCNC: 14.4 G/DL — SIGNIFICANT CHANGE UP (ref 13–17)
IMM GRANULOCYTES NFR BLD AUTO: 0.4 % — SIGNIFICANT CHANGE UP (ref 0–1.5)
LYMPHOCYTES # BLD AUTO: 1.36 K/UL — SIGNIFICANT CHANGE UP (ref 1–3.3)
LYMPHOCYTES # BLD AUTO: 9.3 % — LOW (ref 13–44)
MAGNESIUM SERPL-MCNC: 1.8 MG/DL — SIGNIFICANT CHANGE UP (ref 1.6–2.6)
MCHC RBC-ENTMCNC: 29.9 PG — SIGNIFICANT CHANGE UP (ref 27–34)
MCHC RBC-ENTMCNC: 33.6 GM/DL — SIGNIFICANT CHANGE UP (ref 32–36)
MCV RBC AUTO: 89 FL — SIGNIFICANT CHANGE UP (ref 80–100)
MONOCYTES # BLD AUTO: 1.08 K/UL — HIGH (ref 0–0.9)
MONOCYTES NFR BLD AUTO: 7.4 % — SIGNIFICANT CHANGE UP (ref 2–14)
NEUTROPHILS # BLD AUTO: 11.91 K/UL — HIGH (ref 1.8–7.4)
NEUTROPHILS NFR BLD AUTO: 81.2 % — HIGH (ref 43–77)
NRBC # BLD: 0 /100 WBCS — SIGNIFICANT CHANGE UP (ref 0–0)
PHOSPHATE SERPL-MCNC: 2.3 MG/DL — LOW (ref 2.5–4.5)
PLATELET # BLD AUTO: 183 K/UL — SIGNIFICANT CHANGE UP (ref 150–400)
POTASSIUM SERPL-MCNC: 3.4 MMOL/L — LOW (ref 3.5–5.3)
POTASSIUM SERPL-SCNC: 3.4 MMOL/L — LOW (ref 3.5–5.3)
RBC # BLD: 4.82 M/UL — SIGNIFICANT CHANGE UP (ref 4.2–5.8)
RBC # FLD: 14.7 % — HIGH (ref 10.3–14.5)
SODIUM SERPL-SCNC: 141 MMOL/L — SIGNIFICANT CHANGE UP (ref 135–145)
SURGICAL PATHOLOGY STUDY: SIGNIFICANT CHANGE UP
WBC # BLD: 14.66 K/UL — HIGH (ref 3.8–10.5)
WBC # FLD AUTO: 14.66 K/UL — HIGH (ref 3.8–10.5)

## 2019-12-20 PROCEDURE — 99233 SBSQ HOSP IP/OBS HIGH 50: CPT

## 2019-12-20 RX ORDER — POTASSIUM CHLORIDE 20 MEQ
10 PACKET (EA) ORAL ONCE
Refills: 0 | Status: COMPLETED | OUTPATIENT
Start: 2019-12-20 | End: 2019-12-20

## 2019-12-20 RX ORDER — ACETAMINOPHEN 500 MG
1000 TABLET ORAL EVERY 6 HOURS
Refills: 0 | Status: DISCONTINUED | OUTPATIENT
Start: 2019-12-20 | End: 2019-12-23

## 2019-12-20 RX ORDER — METOCLOPRAMIDE HCL 10 MG
10 TABLET ORAL THREE TIMES A DAY
Refills: 0 | Status: COMPLETED | OUTPATIENT
Start: 2019-12-20 | End: 2019-12-21

## 2019-12-20 RX ORDER — POTASSIUM PHOSPHATE, MONOBASIC POTASSIUM PHOSPHATE, DIBASIC 236; 224 MG/ML; MG/ML
15 INJECTION, SOLUTION INTRAVENOUS ONCE
Refills: 0 | Status: COMPLETED | OUTPATIENT
Start: 2019-12-20 | End: 2019-12-20

## 2019-12-20 RX ORDER — KETOROLAC TROMETHAMINE 30 MG/ML
15 SYRINGE (ML) INJECTION EVERY 6 HOURS
Refills: 0 | Status: DISCONTINUED | OUTPATIENT
Start: 2019-12-20 | End: 2019-12-23

## 2019-12-20 RX ORDER — MEROPENEM 1 G/30ML
1000 INJECTION INTRAVENOUS EVERY 8 HOURS
Refills: 0 | Status: DISCONTINUED | OUTPATIENT
Start: 2019-12-20 | End: 2019-12-23

## 2019-12-20 RX ADMIN — Medication 10 MILLIGRAM(S): at 06:04

## 2019-12-20 RX ADMIN — MORPHINE SULFATE 2 MILLIGRAM(S): 50 CAPSULE, EXTENDED RELEASE ORAL at 08:25

## 2019-12-20 RX ADMIN — MEROPENEM 100 MILLIGRAM(S): 1 INJECTION INTRAVENOUS at 22:31

## 2019-12-20 RX ADMIN — Medication 1000 MILLIGRAM(S): at 01:00

## 2019-12-20 RX ADMIN — Medication 400 MILLIGRAM(S): at 00:35

## 2019-12-20 RX ADMIN — Medication 10 MILLIGRAM(S): at 22:31

## 2019-12-20 RX ADMIN — HEPARIN SODIUM 5000 UNIT(S): 5000 INJECTION INTRAVENOUS; SUBCUTANEOUS at 06:04

## 2019-12-20 RX ADMIN — PIPERACILLIN AND TAZOBACTAM 25 GRAM(S): 4; .5 INJECTION, POWDER, LYOPHILIZED, FOR SOLUTION INTRAVENOUS at 14:10

## 2019-12-20 RX ADMIN — POTASSIUM PHOSPHATE, MONOBASIC POTASSIUM PHOSPHATE, DIBASIC 62.5 MILLIMOLE(S): 236; 224 INJECTION, SOLUTION INTRAVENOUS at 18:09

## 2019-12-20 RX ADMIN — PIPERACILLIN AND TAZOBACTAM 25 GRAM(S): 4; .5 INJECTION, POWDER, LYOPHILIZED, FOR SOLUTION INTRAVENOUS at 06:04

## 2019-12-20 RX ADMIN — Medication 50 MILLIGRAM(S): at 06:04

## 2019-12-20 RX ADMIN — HEPARIN SODIUM 5000 UNIT(S): 5000 INJECTION INTRAVENOUS; SUBCUTANEOUS at 18:08

## 2019-12-20 RX ADMIN — Medication 15 MILLIGRAM(S): at 16:27

## 2019-12-20 RX ADMIN — Medication 10 MILLIGRAM(S): at 14:10

## 2019-12-20 RX ADMIN — AMLODIPINE BESYLATE 10 MILLIGRAM(S): 2.5 TABLET ORAL at 06:04

## 2019-12-20 RX ADMIN — SODIUM CHLORIDE 125 MILLILITER(S): 9 INJECTION, SOLUTION INTRAVENOUS at 18:08

## 2019-12-20 RX ADMIN — MORPHINE SULFATE 2 MILLIGRAM(S): 50 CAPSULE, EXTENDED RELEASE ORAL at 08:06

## 2019-12-20 RX ADMIN — Medication 15 MILLIGRAM(S): at 16:45

## 2019-12-20 RX ADMIN — Medication 100 MILLIEQUIVALENT(S): at 09:53

## 2019-12-20 RX ADMIN — Medication 50 MILLIGRAM(S): at 18:08

## 2019-12-20 NOTE — PROGRESS NOTE ADULT - ATTENDING COMMENTS
I examined patient and plan discussed.  Cultures results Follow up  ID serviced has been called I examined patient and plan discussed with him and PA.  Cultures results Follow up  ID serviced has been called

## 2019-12-20 NOTE — CONSULT NOTE ADULT - ASSESSMENT
acute gangrenous appendicitis with perforation S/P Laparoscopic Appendectomy     ON ZOSYN   no fever, with leukocytosis   abscess culture with ecoli sensitivity pending   smear gram pos cocci  NOTE TO CONTINUE E  EVALUATION IN PROGRESS acute gangrenous appendicitis with perforation S/P Laparoscopic Appendectomy     ON ZOSYN   no fever, with leukocytosis   abscess culture with ecoli sensitivity pending   smear gram pos cocci  postop leukocytosis not clear from reactive ? vs infection   still with pain on exam , postop ileus ?   we will fu cultures   abx will be adjusted as per pending cultures   will consider to repeat CT if any fever or worsening leukocytosis   case discussed with Dr Langley.  we will fu  thanks

## 2019-12-20 NOTE — CONSULT NOTE ADULT - PROBLEM SELECTOR RECOMMENDATION 9
Sepsis POA; resolved   POD 2 laparoscopic appendectomy   Abscess culture: E. coli   ID on board, abx changed to Merrem   cont w/ incentive spirometry  NPO, mgmt as per surgery Sepsis POA; resolved   POD 2 laparoscopic appendectomy   Abscess culture: E. coli   ID on board, abx changed to Merrem   cont w/ incentive spirometry  NPO, Bal drain; mgmt as per surgery

## 2019-12-20 NOTE — PROGRESS NOTE ADULT - SUBJECTIVE AND OBJECTIVE BOX
SURGERY PROGRESS HPI:  S/P Laparoscopic Appendectomy POD#2  Pt seen and examined at bedside. Denies pain or complaints. No acute events overnight. Pt denies nausea and vomiting. No BM/flatus. Pt denies chest pain, SOB, dizziness, fever, chills.     Vital Signs Last 24 Hrs  T(C): 37.1 (20 Dec 2019 05:00), Max: 37.6 (19 Dec 2019 23:20)  T(F): 98.8 (20 Dec 2019 05:00), Max: 99.6 (19 Dec 2019 23:20)  HR: 110 (20 Dec 2019 05:00) (92 - 110)  BP: 142/96 (20 Dec 2019 05:00) (130/91 - 143/79)  BP(mean): --  RR: 18 (20 Dec 2019 05:00) (17 - 18)  SpO2: 98% (20 Dec 2019 05:00) (92% - 98%)    PE:  GENERAL: Alert, NAD  CHEST/LUNG: Clear to auscultation bilaterally, respirations nonlabored  HEART: S1S2, Regular rate and rhythm  ABDOMEN: Dressings C/D/I. ARI with serous output. Softly distention, Hypoactive bowel sounds, soft, Appropriate incisional tenderness  EXTREMITIES: no calf tenderness, No edema, intermittent compression devices in place bilaterally    I&O's Detail    18 Dec 2019 07:01  -  19 Dec 2019 07:00  --------------------------------------------------------  IN:    Lactated Ringers IV Bolus: 1000 mL    lactated ringers.: 1400 mL    lactated ringers.: 150 mL    Sodium Chloride 0.9% IV Bolus: 1000 mL    Solution: 100 mL    Solution: 200 mL  Total IN: 3850 mL    OUT:    Bulb: 160 mL    Indwelling Catheter - Urethral: 1650 mL  Total OUT: 1810 mL    Total NET: 2040 mL      19 Dec 2019 07:01  -  20 Dec 2019 06:07  --------------------------------------------------------  IN:  Total IN: 0 mL    OUT:    Bulb: 20 mL    Voided: 3050 mL  Total OUT: 3070 mL    Total NET: -3070 mL      LABS:                        12.7   13.38 )-----------( 181      ( 19 Dec 2019 07:16 )             37.9     12-19    142  |  111<H>  |  24<H>  ----------------------------<  101<H>  3.5   |  28  |  1.36<H>    Ca    8.8      19 Dec 2019 07:16  Phos  2.3     12-19  Mg     2.1     12-19      PT/INR - ( 18 Dec 2019 09:17 )   PT: 15.7 sec;   INR: 1.39 ratio    PTT - ( 18 Dec 2019 09:17 )  PTT:31.4 sec    Culture Results:   <10,000 CFU/mL Normal Urogenital Iqra (12-18 @ 09:09)      Assessment: 63 y/o male with acute gangrenous appendicitis with perforation S/P Laparoscopic Appendectomy POD#2    Plan:  - NPO. IV hydration  - local wound care  - ARI care. Monitor output  - DVT prophylaxis, Incentive Spirometer, OOB, Ambulating, pain control  - Continue Zosyn therapeutically   - f/u labs   - Will discuss with attending SURGERY PROGRESS HPI:  S/P Laparoscopic Appendectomy POD#2  Pt seen and examined at bedside. Denies pain or complaints. No acute events overnight. Pt denies nausea and vomiting. No BM/flatus. Pt denies chest pain, SOB, dizziness, fever, chills.     Vital Signs Last 24 Hrs  T(C): 37.1 (20 Dec 2019 05:00), Max: 37.6 (19 Dec 2019 23:20)  T(F): 98.8 (20 Dec 2019 05:00), Max: 99.6 (19 Dec 2019 23:20)  HR: 110 (20 Dec 2019 05:00) (92 - 110)  BP: 142/96 (20 Dec 2019 05:00) (130/91 - 143/79)  BP(mean): --  RR: 18 (20 Dec 2019 05:00) (17 - 18)  SpO2: 98% (20 Dec 2019 05:00) (92% - 98%)    PE:  GENERAL: Alert, NAD  CHEST/LUNG: Clear to auscultation bilaterally, respirations nonlabored  HEART: S1S2, Regular rate and rhythm  ABDOMEN: Dressings C/D/I. ARI with serous output. Softly distention, Hypoactive bowel sounds, soft, Appropriate incisional tenderness  EXTREMITIES: no calf tenderness, No edema, intermittent compression devices in place bilaterally    I&O's Detail    18 Dec 2019 07:01  -  19 Dec 2019 07:00  --------------------------------------------------------  IN:    Lactated Ringers IV Bolus: 1000 mL    lactated ringers.: 1400 mL    lactated ringers.: 150 mL    Sodium Chloride 0.9% IV Bolus: 1000 mL    Solution: 100 mL    Solution: 200 mL  Total IN: 3850 mL    OUT:    Bulb: 160 mL    Indwelling Catheter - Urethral: 1650 mL  Total OUT: 1810 mL    Total NET: 2040 mL      19 Dec 2019 07:01  -  20 Dec 2019 06:07  --------------------------------------------------------  IN:  Total IN: 0 mL    OUT:    Bulb: 20 mL    Voided: 3050 mL  Total OUT: 3070 mL    Total NET: -3070 mL      LABS:                        12.7   13.38 )-----------( 181      ( 19 Dec 2019 07:16 )             37.9     12-19    142  |  111<H>  |  24<H>  ----------------------------<  101<H>  3.5   |  28  |  1.36<H>    Ca    8.8      19 Dec 2019 07:16  Phos  2.3     12-19  Mg     2.1     12-19      PT/INR - ( 18 Dec 2019 09:17 )   PT: 15.7 sec;   INR: 1.39 ratio    PTT - ( 18 Dec 2019 09:17 )  PTT:31.4 sec    Culture Results:   <10,000 CFU/mL Normal Urogenital Iqra (12-18 @ 09:09)      Assessment: 63 y/o male with acute gangrenous appendicitis with perforation S/P Laparoscopic Appendectomy POD#2    Plan:  - NPO. IV hydration  - local wound care  - ARI care. Monitor output  - DVT prophylaxis, Incentive Spirometer, OOB, Ambulating, pain control  - Continue Zosyn therapeutically   - f/u labs   - Reglan x 3 doses  - Will discuss with attending SURGERY PROGRESS HPI:  S/P Laparoscopic Appendectomy POD#2  Pt seen and examined at bedside. Denies pain or complaints. No acute events overnight. Pt denies nausea and vomiting. No BM/flatus. Pt denies chest pain, SOB, dizziness, fever, chills.     Vital Signs Last 24 Hrs  T(C): 37.1 (20 Dec 2019 05:00), Max: 37.6 (19 Dec 2019 23:20)  T(F): 98.8 (20 Dec 2019 05:00), Max: 99.6 (19 Dec 2019 23:20)  HR: 110 (20 Dec 2019 05:00) (92 - 110)  BP: 142/96 (20 Dec 2019 05:00) (130/91 - 143/79)  BP(mean): --  RR: 18 (20 Dec 2019 05:00) (17 - 18)  SpO2: 98% (20 Dec 2019 05:00) (92% - 98%)    PE:  GENERAL: Alert, NAD  CHEST/LUNG: Clear to auscultation bilaterally, respirations nonlabored  HEART: S1S2, Regular rate and rhythm  ABDOMEN: Dressings C/D/I. ARI with serous output. Softly distention, Hypoactive bowel sounds, soft, Appropriate incisional tenderness  EXTREMITIES: no calf tenderness, No edema, intermittent compression devices in place bilaterally    I&O's Detail    18 Dec 2019 07:01  -  19 Dec 2019 07:00  --------------------------------------------------------  IN:    Lactated Ringers IV Bolus: 1000 mL    lactated ringers.: 1400 mL    lactated ringers.: 150 mL    Sodium Chloride 0.9% IV Bolus: 1000 mL    Solution: 100 mL    Solution: 200 mL  Total IN: 3850 mL    OUT:    Bulb: 160 mL    Indwelling Catheter - Urethral: 1650 mL  Total OUT: 1810 mL    Total NET: 2040 mL      19 Dec 2019 07:01  -  20 Dec 2019 06:07  --------------------------------------------------------  IN:  Total IN: 0 mL    OUT:    Bulb: 20 mL    Voided: 3050 mL  Total OUT: 3070 mL    Total NET: -3070 mL      LABS:                        12.7   13.38 )-----------( 181      ( 19 Dec 2019 07:16 )             37.9     12-19    142  |  111<H>  |  24<H>  ----------------------------<  101<H>  3.5   |  28  |  1.36<H>    Ca    8.8      19 Dec 2019 07:16  Phos  2.3     12-19  Mg     2.1     12-19      PT/INR - ( 18 Dec 2019 09:17 )   PT: 15.7 sec;   INR: 1.39 ratio    PTT - ( 18 Dec 2019 09:17 )  PTT:31.4 sec    Culture Results:   <10,000 CFU/mL Normal Urogenital Iqra (12-18 @ 09:09)      Assessment:     63 y/o male with acute gangrenous appendicitis with perforation S/P Laparoscopic Appendectomy POD#2    Plan:  - NPO. IV hydration  - local wound care  - ARI care. Monitor output  - DVT prophylaxis, Incentive Spirometer, OOB, Ambulating, pain control  - Continue Zosyn therapeutically   - f/u labs   - Reglan x 3 doses  - Will discuss with attending SURGERY PROGRESS HPI:  S/P Laparoscopic Appendectomy POD#2  Pt seen and examined at bedside. Denies pain or complaints. No acute events overnight. Pt denies nausea and vomiting. No BM/flatus. Pt denies chest pain, SOB, dizziness, fever, chills.     Vital Signs Last 24 Hrs  T(C): 37.1 (20 Dec 2019 05:00), Max: 37.6 (19 Dec 2019 23:20)  T(F): 98.8 (20 Dec 2019 05:00), Max: 99.6 (19 Dec 2019 23:20)  HR: 110 (20 Dec 2019 05:00) (92 - 110)  BP: 142/96 (20 Dec 2019 05:00) (130/91 - 143/79)  BP(mean): --  RR: 18 (20 Dec 2019 05:00) (17 - 18)  SpO2: 98% (20 Dec 2019 05:00) (92% - 98%)    PE:  GENERAL: Alert, NAD  CHEST/LUNG: Clear to auscultation bilaterally, respirations nonlabored  HEART: S1S2, Regular rate and rhythm  ABDOMEN: Dressings C/D/I. ARI with serous output. Softly distention, Hypoactive bowel sounds, soft, Appropriate incisional tenderness  EXTREMITIES: no calf tenderness, No edema, intermittent compression devices in place bilaterally    I&O's Detail    18 Dec 2019 07:01  -  19 Dec 2019 07:00  --------------------------------------------------------  IN:    Lactated Ringers IV Bolus: 1000 mL    lactated ringers.: 1400 mL    lactated ringers.: 150 mL    Sodium Chloride 0.9% IV Bolus: 1000 mL    Solution: 100 mL    Solution: 200 mL  Total IN: 3850 mL    OUT:    Bulb: 160 mL    Indwelling Catheter - Urethral: 1650 mL  Total OUT: 1810 mL    Total NET: 2040 mL      19 Dec 2019 07:01  -  20 Dec 2019 06:07  --------------------------------------------------------  IN:  Total IN: 0 mL    OUT:    Bulb: 20 mL    Voided: 3050 mL  Total OUT: 3070 mL    Total NET: -3070 mL      LABS:                        12.7   13.38 )-----------( 181      ( 19 Dec 2019 07:16 )             37.9     12-19    142  |  111<H>  |  24<H>  ----------------------------<  101<H>  3.5   |  28  |  1.36<H>    Ca    8.8      19 Dec 2019 07:16  Phos  2.3     12-19  Mg     2.1     12-19      PT/INR - ( 18 Dec 2019 09:17 )   PT: 15.7 sec;   INR: 1.39 ratio    PTT - ( 18 Dec 2019 09:17 )  PTT:31.4 sec    Culture Results:   <10,000 CFU/mL Normal Urogenital Iqra (12-18 @ 09:09)  Peritoneal fluid culture::  +  E-coli    Assessment:     63 y/o male with acute gangrenous appendicitis with perforation and Abscess. S/P Laparoscopic Appendectomy POD#2.  Post-op Moderate Intestinal Ileus.  Leukocytosis.    Plan:  - NPO. IV hydration  - local wound care  - ARI care. Monitor output  - DVT prophylaxis, Incentive Spirometer, OOB, Ambulating, pain control  - Continue Zosyn therapeutically.  - ID evaluation.   - f/u labs   - Reglan x 3 doses  - Discussed  with attending

## 2019-12-20 NOTE — PROVIDER CONTACT NOTE (CRITICAL VALUE NOTIFICATION) - TEST AND RESULT REPORTED:
Peritoneal abscess from Dec 18, gram stain , rare polymorphonuclear leukocyte per low power field rare gram positive Cocci per oil power field.

## 2019-12-20 NOTE — CONSULT NOTE ADULT - SUBJECTIVE AND OBJECTIVE BOX
Patient is a 64y old  Male who presents with a chief complaint of acute appendicitis, Abdominal pain, + CT scan (20 Dec 2019 14:52)      INTERVAL HPI/ OVERNIGHT EVENTS: Pt was seen and examined at bedside today, No significant overnight events, pt denies any significant abdominal pain other than surgical wounds, denies any BM yet but has been passing gas, no nausea/vomiting.     MEDICATIONS  (STANDING):  amLODIPine   Tablet 10 milliGRAM(s) Oral daily  bisacodyl Suppository 10 milliGRAM(s) Rectal once  heparin  Injectable 5000 Unit(s) SubCutaneous every 12 hours  influenza   Vaccine 0.5 milliLiter(s) IntraMuscular once  lactated ringers. 1000 milliLiter(s) (125 mL/Hr) IV Continuous <Continuous>  meropenem  IVPB 1000 milliGRAM(s) IV Intermittent every 8 hours  metoclopramide Injectable 10 milliGRAM(s) IV Push three times a day  metoprolol tartrate 50 milliGRAM(s) Oral two times a day  potassium phosphate IVPB 15 milliMole(s) IV Intermittent once    MEDICATIONS  (PRN):  acetaminophen  IVPB .. 1000 milliGRAM(s) IV Intermittent every 6 hours PRN Mild Pain (1 - 3)  benzocaine 15 mG/menthol 3.6 mG (Sugar-Free) Lozenge 1 Lozenge Oral every 3 hours PRN Sore Throat  ketorolac   Injectable 15 milliGRAM(s) IV Push every 6 hours PRN Moderate Pain (4 - 6)  ondansetron Injectable 4 milliGRAM(s) IV Push every 6 hours PRN Nausea and/or Vomiting      Allergies    No Known Allergies    Intolerances        REVIEW OF SYSTEMS:    Unable to examine due to [ ] Encephalopathy [ ] Advanced Dementia [ ] Expressive Aphasia [ ] Non-verbal patient    CONSTITUTIONAL: No fever, NO generalized weakness/Fatigue, No weight loss  EYES: No eye pain, visual disturbances, or discharge  ENMT:  No difficulty hearing, tinnitus, vertigo; No sinus or throat pain  NECK: No pain or stiffness  RESPIRATORY: No shortness of breath,  cough, wheezing, sputum or hemoptysis   CARDIOVASCULAR: No chest pain, palpitations, or leg swelling  GASTROINTESTINAL: No abdominal pain. No nausea, vomiting, diarrhea or constipation. No melena or hematochezia.  GENITOURINARY: No dysuria, frequency, hematuria, or incontinence  NEUROLOGICAL: No headaches, Dizziness, memory loss, loss of strength, numbness, or tremors  SKIN: surgical wound pain in abdomen   MUSCULOSKELETAL: No joint pain or swelling; No muscle, back, or extremity pain  PSYCHIATRIC: No depression, anxiety, mood swings, or difficulty sleeping  HEME/LYMPH: No easy bruising, or bleeding gums        Vital Signs Last 24 Hrs  T(C): 36.2 (20 Dec 2019 12:17), Max: 37.6 (19 Dec 2019 23:20)  T(F): 97.1 (20 Dec 2019 12:17), Max: 99.6 (19 Dec 2019 23:20)  HR: 102 (20 Dec 2019 12:17) (99 - 110)  BP: 153/100 (20 Dec 2019 12:17) (140/89 - 153/100)  BP(mean): --  RR: 18 (20 Dec 2019 12:17) (17 - 18)  SpO2: 93% (20 Dec 2019 12:17) (93% - 98%)    PHYSICAL EXAM:  GENERAL: NAD, well-developed, well-groomed  HEAD:  Atraumatic, Normocephalic  EYES: conjunctiva and sclera clear  ENMT: Moist mucous membranes  NECK: Supple, No JVD, Normal thyroid  CHEST/LUNG: Clear to Auscultation bilaterally; No rales, rhonchi, wheezing, or rubs  HEART: Regular rate and rhythm; No murmurs, rubs, or gallops  ABDOMEN: Soft, Nontender, Nondistended; Bowel sounds present  EXTREMITIES:  2+ Peripheral Pulses, No clubbing, cyanosis, or edema  SKIN: laparoscopic surgical wounds on abdomen   NERVOUS SYSTEM:  Alert & Oriented X3, Good concentration; Motor Strength 5/5 B/L upper and lower extremities    LABS:                        14.4   14.66 )-----------( 183      ( 20 Dec 2019 06:59 )             42.9     12-20    141  |  107  |  21  ----------------------------<  65<L>  3.4<L>   |  23  |  1.29    Ca    9.3      20 Dec 2019 06:59  Phos  2.3     12-20  Mg     1.8     12-20          CAPILLARY BLOOD GLUCOSE            Culture - Abscess with Gram Stain (collected 12-19-19)  Source: .Abscess PERITONEAL ABSCESS  Preliminary Report (12-20-19):    Few Escherichia coli    Culture - Urine (collected 12-18-19)  Source: .Urine Clean Catch (Midstream)  Final Report (12-19-19):    <10,000 CFU/mL Normal Urogenital Iqra        RADIOLOGY & ADDITIONAL TESTS:          Imaging Personally Reviewed:  [ ] YES  [ ] NO    Consultant(s) Notes Reviewed:  [x ] YES  [ ] NO    Care Discussed with Consultants/Other Providers [x ] YES  [ ] NO Patient is a 64y old  Male who presents with a chief complaint of acute appendicitis, Abdominal pain, + CT scan (20 Dec 2019 14:52)      INTERVAL HPI/ OVERNIGHT EVENTS: Pt was seen and examined at bedside today, No significant overnight events, pt denies any significant abdominal pain other than surgical wounds, denies any BM yet but has been passing gas, no nausea/vomiting.     MEDICATIONS  (STANDING):  amLODIPine   Tablet 10 milliGRAM(s) Oral daily  bisacodyl Suppository 10 milliGRAM(s) Rectal once  heparin  Injectable 5000 Unit(s) SubCutaneous every 12 hours  influenza   Vaccine 0.5 milliLiter(s) IntraMuscular once  lactated ringers. 1000 milliLiter(s) (125 mL/Hr) IV Continuous <Continuous>  meropenem  IVPB 1000 milliGRAM(s) IV Intermittent every 8 hours  metoclopramide Injectable 10 milliGRAM(s) IV Push three times a day  metoprolol tartrate 50 milliGRAM(s) Oral two times a day  potassium phosphate IVPB 15 milliMole(s) IV Intermittent once    MEDICATIONS  (PRN):  acetaminophen  IVPB .. 1000 milliGRAM(s) IV Intermittent every 6 hours PRN Mild Pain (1 - 3)  benzocaine 15 mG/menthol 3.6 mG (Sugar-Free) Lozenge 1 Lozenge Oral every 3 hours PRN Sore Throat  ketorolac   Injectable 15 milliGRAM(s) IV Push every 6 hours PRN Moderate Pain (4 - 6)  ondansetron Injectable 4 milliGRAM(s) IV Push every 6 hours PRN Nausea and/or Vomiting      Allergies    No Known Allergies    Intolerances        REVIEW OF SYSTEMS:    Unable to examine due to [ ] Encephalopathy [ ] Advanced Dementia [ ] Expressive Aphasia [ ] Non-verbal patient    CONSTITUTIONAL: No fever, NO generalized weakness/Fatigue, No weight loss  EYES: No eye pain, visual disturbances, or discharge  ENMT:  No difficulty hearing, tinnitus, vertigo; No sinus or throat pain  NECK: No pain or stiffness  RESPIRATORY: No shortness of breath,  cough, wheezing, sputum or hemoptysis   CARDIOVASCULAR: No chest pain, palpitations, or leg swelling  GASTROINTESTINAL: No abdominal pain. No nausea, vomiting, diarrhea or constipation. No melena or hematochezia.  GENITOURINARY: No dysuria, frequency, hematuria, or incontinence  NEUROLOGICAL: No headaches, Dizziness, memory loss, loss of strength, numbness, or tremors  SKIN: surgical wound pain in abdomen   MUSCULOSKELETAL: No joint pain or swelling; No muscle, back, or extremity pain  PSYCHIATRIC: No depression, anxiety, mood swings, or difficulty sleeping  HEME/LYMPH: No easy bruising, or bleeding gums        Vital Signs Last 24 Hrs  T(C): 36.2 (20 Dec 2019 12:17), Max: 37.6 (19 Dec 2019 23:20)  T(F): 97.1 (20 Dec 2019 12:17), Max: 99.6 (19 Dec 2019 23:20)  HR: 102 (20 Dec 2019 12:17) (99 - 110)  BP: 153/100 (20 Dec 2019 12:17) (140/89 - 153/100)  BP(mean): --  RR: 18 (20 Dec 2019 12:17) (17 - 18)  SpO2: 93% (20 Dec 2019 12:17) (93% - 98%)    PHYSICAL EXAM:  GENERAL: NAD, well-developed, well-groomed  HEAD:  Atraumatic, Normocephalic  EYES: conjunctiva and sclera clear  ENMT: Moist mucous membranes  NECK: Supple, No JVD, Normal thyroid  CHEST/LUNG: Clear to Auscultation bilaterally; No rales, rhonchi, wheezing, or rubs  HEART: Regular rate and rhythm; No murmurs, rubs, or gallops  ABDOMEN: Bal drain, Soft, Nontender, Nondistended; Bowel sounds present  EXTREMITIES:  2+ Peripheral Pulses, No clubbing, cyanosis, or edema  SKIN: laparoscopic surgical wounds on abdomen   NERVOUS SYSTEM:  Alert & Oriented X3, Good concentration; Motor Strength 5/5 B/L upper and lower extremities    LABS:                        14.4   14.66 )-----------( 183      ( 20 Dec 2019 06:59 )             42.9     12-20    141  |  107  |  21  ----------------------------<  65<L>  3.4<L>   |  23  |  1.29    Ca    9.3      20 Dec 2019 06:59  Phos  2.3     12-20  Mg     1.8     12-20          CAPILLARY BLOOD GLUCOSE            Culture - Abscess with Gram Stain (collected 12-19-19)  Source: .Abscess PERITONEAL ABSCESS  Preliminary Report (12-20-19):    Few Escherichia coli    Culture - Urine (collected 12-18-19)  Source: .Urine Clean Catch (Midstream)  Final Report (12-19-19):    <10,000 CFU/mL Normal Urogenital Iqra        RADIOLOGY & ADDITIONAL TESTS:          Imaging Personally Reviewed:  [ ] YES  [ ] NO    Consultant(s) Notes Reviewed:  [x ] YES  [ ] NO    Care Discussed with Consultants/Other Providers [x ] YES  [ ] NO

## 2019-12-20 NOTE — CONSULT NOTE ADULT - SUBJECTIVE AND OBJECTIVE BOX
64 year old man with PMH HTN, HLD who presents to the Emergency Room  c/o severe, intermittent, 10/10 RLQ pain starting 3 days ago, getting worse last night. Admits to associated anorexia. Reports occasional relief from pain when laying still. States pain is exacerbated with movement. Denies fever, chills, chest pain, sob, dysuria, change in bowel habits. As per patient has BPH as per Urologist, who recommended Surgical intervention,  but he declined.  ER CT scan of abdomen  revealed Acute Appendicitis. Blood tests revealed elevated Cr. Patient don't  Know if suffers from Renal disfunction (18 Dec 2019 07:47)      PAST MEDICAL & SURGICAL HISTORY:  Hyperlipidemia  Hypertension  No significant past surgical history      SOCHX:   tobacco,  -  alcohol    FMHX: FA/MO  - contributory       Recent Travel:    Immunizations:    Allergies    No Known Allergies    Intolerances        MEDICATIONS  (STANDING):  amLODIPine   Tablet 10 milliGRAM(s) Oral daily  bisacodyl Suppository 10 milliGRAM(s) Rectal once  heparin  Injectable 5000 Unit(s) SubCutaneous every 12 hours  influenza   Vaccine 0.5 milliLiter(s) IntraMuscular once  lactated ringers. 1000 milliLiter(s) (125 mL/Hr) IV Continuous <Continuous>  meropenem  IVPB 1000 milliGRAM(s) IV Intermittent every 8 hours  metoclopramide Injectable 10 milliGRAM(s) IV Push three times a day  metoprolol tartrate 50 milliGRAM(s) Oral two times a day  potassium phosphate IVPB 15 milliMole(s) IV Intermittent once      REVIEW OF SYSTEMS:  CONSTITUTIONAL: No fever, weight loss, or fatigue  EYES: No eye pain, visual disturbances, or discharge  ENMT:  No difficulty hearing, tinnitus, vertigo; No sinus or throat pain  NECK: No pain or stiffness  BREASTS: No pain, masses, or nipple discharge  RESPIRATORY: No cough, wheezing, chills or hemoptysis; No shortness of breath  CARDIOVASCULAR: No chest pain, palpitations, dizziness, or leg swelling  GASTROINTESTINAL: No abdominal or epigastric pain. No nausea, vomiting, or hematemesis; No diarrhea or constipation. No melena or hematochezia.  GENITOURINARY: No dysuria, frequency, hematuria, or incontinence  NEUROLOGICAL: No headaches, memory loss, loss of strength, numbness, or tremors  SKIN: No itching, burning, rashes, or lesions       VITAL SIGNS:    T(C): 36.2 (12-20-19 @ 12:17), Max: 37.6 (12-19-19 @ 23:20)  T(F): 97.1 (12-20-19 @ 12:17), Max: 99.6 (12-19-19 @ 23:20)  HR: 102 (12-20-19 @ 12:17) (99 - 110)  BP: 153/100 (12-20-19 @ 12:17) (140/89 - 153/100)  RR: 18 (12-20-19 @ 12:17) (17 - 18)  SpO2: 93% (12-20-19 @ 12:17) (93% - 98%)    PHYSICAL EXAM:    GENERAL: NAD, well-groomed, well-developed  HEAD:  Atraumatic, Normocephalic  EYES: EOMI, PERRLA, conjunctiva and sclera clear  ENMT: No tonsillar erythema, exudates, or enlargement; Moist mucous membranes, Good dentition, No lesions  NECK: Supple, No JVD, Normal thyroid  NERVOUS SYSTEM:  Alert & Oriented   CHEST/LUNG: Clear bilaterally; No rales, rhonchi, wheezing bilaterally  HEART: Regular rate and rhythm; No murmurs, rubs, or gallops  ABDOMEN: Soft, Nontender, Nondistended; Bowel sounds present  EXTREMITIES:  2+ Peripheral Pulses, No clubbing, cyanosis, or edema  LYMPH: No lymphadenopathy noted  SKIN: No rashes or lesions  BACK: no pressor sore     LABS:                         14.4   14.66 )-----------( 183      ( 20 Dec 2019 06:59 )             42.9     12-20    141  |  107  |  21  ----------------------------<  65<L>  3.4<L>   |  23  |  1.29    Ca    9.3      20 Dec 2019 06:59  Phos  2.3     12-20  Mg     1.8     12-20                                Culture Results:   Few Escherichia coli (12-19 @ 06:57)  Culture Results:   <10,000 CFU/mL Normal Urogenital Iqra (12-18 @ 09:09)                Radiology: 64 year old man with PMH HTN, HLD who presents to the Emergency Room  c/o severe, intermittent, 10/10 RLQ pain starting 3 days ago, getting worse last night. Admits to associated anorexia. Reports occasional relief from pain when laying still. States pain is exacerbated with movement. Denies fever, chills, chest pain, sob, dysuria, change in bowel habits. As per patient has BPH as per Urologist, who recommended Surgical intervention,  but he declined.  ER CT scan of abdomen  revealed Acute Appendicitis. Blood tests revealed elevated Cr. Patient don't  Know if suffers from Renal disfunction (18 Dec 2019 07:47)      PAST MEDICAL & SURGICAL HISTORY:  Hyperlipidemia  Hypertension  No significant past surgical history      SOCHX:   tobacco,  -  alcohol    FMHX: FA/MO  - contributory       Recent Travel:    Immunizations:    Allergies    No Known Allergies    Intolerances        MEDICATIONS  (STANDING):  amLODIPine   Tablet 10 milliGRAM(s) Oral daily  bisacodyl Suppository 10 milliGRAM(s) Rectal once  heparin  Injectable 5000 Unit(s) SubCutaneous every 12 hours  influenza   Vaccine 0.5 milliLiter(s) IntraMuscular once  lactated ringers. 1000 milliLiter(s) (125 mL/Hr) IV Continuous <Continuous>  meropenem  IVPB 1000 milliGRAM(s) IV Intermittent every 8 hours  metoclopramide Injectable 10 milliGRAM(s) IV Push three times a day  metoprolol tartrate 50 milliGRAM(s) Oral two times a day  potassium phosphate IVPB 15 milliMole(s) IV Intermittent once      REVIEW OF SYSTEMS:  CONSTITUTIONAL: No fever, weight loss, or fatigue  EYES: No eye pain, visual disturbances, or discharge  ENMT:  No difficulty hearing, tinnitus, vertigo; No sinus or throat pain  NECK: No pain or stiffness  BREASTS: No pain, masses, or nipple discharge  RESPIRATORY: No cough, wheezing, chills or hemoptysis; No shortness of breath  CARDIOVASCULAR: No chest pain, palpitations, dizziness, or leg swelling  GASTROINTESTINAL: abdominal pain +  GENITOURINARY: No dysuria, frequency, hematuria, or incontinence  NEUROLOGICAL: No headaches, memory loss, loss of strength, numbness, or tremors  SKIN: No itching, burning, rashes, or lesions       VITAL SIGNS:    T(C): 36.2 (12-20-19 @ 12:17), Max: 37.6 (12-19-19 @ 23:20)  T(F): 97.1 (12-20-19 @ 12:17), Max: 99.6 (12-19-19 @ 23:20)  HR: 102 (12-20-19 @ 12:17) (99 - 110)  BP: 153/100 (12-20-19 @ 12:17) (140/89 - 153/100)  RR: 18 (12-20-19 @ 12:17) (17 - 18)  SpO2: 93% (12-20-19 @ 12:17) (93% - 98%)    PHYSICAL EXAM:    GENERAL: NAD, well-groomed, well-developed  HEAD:  Atraumatic, Normocephalic  EYES: EOMI, PERRLA, conjunctiva and sclera clear  NECK: Supple, No JVD, Normal thyroid  NERVOUS SYSTEM:  Alert & Oriented   CHEST/LUNG: Clear bilaterally; No rales, rhonchi, wheezing bilaterally  HEART: Regular rate and rhythm; No murmurs, rubs, or gallops  ABDOMEN: distended, ++ tender  EXTREMITIES:  2+ Peripheral Pulses, No clubbing, cyanosis, or edema  LYMPH: No lymphadenopathy noted  SKIN: No rashes or lesions  BACK: no pressor sore     LABS:                         14.4   14.66 )-----------( 183      ( 20 Dec 2019 06:59 )             42.9     12-20    141  |  107  |  21  ----------------------------<  65<L>  3.4<L>   |  23  |  1.29    Ca    9.3      20 Dec 2019 06:59  Phos  2.3     12-20  Mg     1.8     12-20                                Culture Results:   Few Escherichia coli (12-19 @ 06:57)  Culture Results:   <10,000 CFU/mL Normal Urogenital Iqra (12-18 @ 09:09)                Radiology:

## 2019-12-21 LAB
-  AMIKACIN: SIGNIFICANT CHANGE UP
-  AMIKACIN: SIGNIFICANT CHANGE UP
-  AMOXICILLIN/CLAVULANIC ACID: SIGNIFICANT CHANGE UP
-  AMOXICILLIN/CLAVULANIC ACID: SIGNIFICANT CHANGE UP
-  AMPICILLIN/SULBACTAM: SIGNIFICANT CHANGE UP
-  AMPICILLIN/SULBACTAM: SIGNIFICANT CHANGE UP
-  AMPICILLIN: SIGNIFICANT CHANGE UP
-  AMPICILLIN: SIGNIFICANT CHANGE UP
-  AZTREONAM: SIGNIFICANT CHANGE UP
-  AZTREONAM: SIGNIFICANT CHANGE UP
-  CEFAZOLIN: SIGNIFICANT CHANGE UP
-  CEFAZOLIN: SIGNIFICANT CHANGE UP
-  CEFEPIME: SIGNIFICANT CHANGE UP
-  CEFEPIME: SIGNIFICANT CHANGE UP
-  CEFOXITIN: SIGNIFICANT CHANGE UP
-  CEFOXITIN: SIGNIFICANT CHANGE UP
-  CEFTRIAXONE: SIGNIFICANT CHANGE UP
-  CEFTRIAXONE: SIGNIFICANT CHANGE UP
-  CIPROFLOXACIN: SIGNIFICANT CHANGE UP
-  CIPROFLOXACIN: SIGNIFICANT CHANGE UP
-  ERTAPENEM: SIGNIFICANT CHANGE UP
-  ERTAPENEM: SIGNIFICANT CHANGE UP
-  GENTAMICIN: SIGNIFICANT CHANGE UP
-  GENTAMICIN: SIGNIFICANT CHANGE UP
-  IMIPENEM: SIGNIFICANT CHANGE UP
-  IMIPENEM: SIGNIFICANT CHANGE UP
-  LEVOFLOXACIN: SIGNIFICANT CHANGE UP
-  LEVOFLOXACIN: SIGNIFICANT CHANGE UP
-  MEROPENEM: SIGNIFICANT CHANGE UP
-  MEROPENEM: SIGNIFICANT CHANGE UP
-  PIPERACILLIN/TAZOBACTAM: SIGNIFICANT CHANGE UP
-  PIPERACILLIN/TAZOBACTAM: SIGNIFICANT CHANGE UP
-  TOBRAMYCIN: SIGNIFICANT CHANGE UP
-  TOBRAMYCIN: SIGNIFICANT CHANGE UP
-  TRIMETHOPRIM/SULFAMETHOXAZOLE: SIGNIFICANT CHANGE UP
-  TRIMETHOPRIM/SULFAMETHOXAZOLE: SIGNIFICANT CHANGE UP
ANION GAP SERPL CALC-SCNC: 13 MMOL/L — SIGNIFICANT CHANGE UP (ref 5–17)
BUN SERPL-MCNC: 23 MG/DL — SIGNIFICANT CHANGE UP (ref 7–23)
CALCIUM SERPL-MCNC: 9.2 MG/DL — SIGNIFICANT CHANGE UP (ref 8.5–10.1)
CHLORIDE SERPL-SCNC: 106 MMOL/L — SIGNIFICANT CHANGE UP (ref 96–108)
CO2 SERPL-SCNC: 22 MMOL/L — SIGNIFICANT CHANGE UP (ref 22–31)
CREAT SERPL-MCNC: 1.06 MG/DL — SIGNIFICANT CHANGE UP (ref 0.5–1.3)
CULTURE RESULTS: SIGNIFICANT CHANGE UP
GLUCOSE SERPL-MCNC: 74 MG/DL — SIGNIFICANT CHANGE UP (ref 70–99)
HCT VFR BLD CALC: 42.4 % — SIGNIFICANT CHANGE UP (ref 39–50)
HGB BLD-MCNC: 14.7 G/DL — SIGNIFICANT CHANGE UP (ref 13–17)
MAGNESIUM SERPL-MCNC: 2.1 MG/DL — SIGNIFICANT CHANGE UP (ref 1.6–2.6)
MCHC RBC-ENTMCNC: 29.9 PG — SIGNIFICANT CHANGE UP (ref 27–34)
MCHC RBC-ENTMCNC: 34.7 GM/DL — SIGNIFICANT CHANGE UP (ref 32–36)
MCV RBC AUTO: 86.4 FL — SIGNIFICANT CHANGE UP (ref 80–100)
METHOD TYPE: SIGNIFICANT CHANGE UP
METHOD TYPE: SIGNIFICANT CHANGE UP
NRBC # BLD: 0 /100 WBCS — SIGNIFICANT CHANGE UP (ref 0–0)
ORGANISM # SPEC MICROSCOPIC CNT: SIGNIFICANT CHANGE UP
PHOSPHATE SERPL-MCNC: 2.5 MG/DL — SIGNIFICANT CHANGE UP (ref 2.5–4.5)
PLATELET # BLD AUTO: 217 K/UL — SIGNIFICANT CHANGE UP (ref 150–400)
POTASSIUM SERPL-MCNC: 3.2 MMOL/L — LOW (ref 3.5–5.3)
POTASSIUM SERPL-SCNC: 3.2 MMOL/L — LOW (ref 3.5–5.3)
PROCALCITONIN SERPL-MCNC: 5.68 NG/ML — HIGH (ref 0.02–0.1)
RBC # BLD: 4.91 M/UL — SIGNIFICANT CHANGE UP (ref 4.2–5.8)
RBC # FLD: 14.7 % — HIGH (ref 10.3–14.5)
SODIUM SERPL-SCNC: 141 MMOL/L — SIGNIFICANT CHANGE UP (ref 135–145)
SPECIMEN SOURCE: SIGNIFICANT CHANGE UP
WBC # BLD: 12.55 K/UL — HIGH (ref 3.8–10.5)
WBC # FLD AUTO: 12.55 K/UL — HIGH (ref 3.8–10.5)

## 2019-12-21 PROCEDURE — 99233 SBSQ HOSP IP/OBS HIGH 50: CPT

## 2019-12-21 RX ORDER — POTASSIUM CHLORIDE 20 MEQ
40 PACKET (EA) ORAL ONCE
Refills: 0 | Status: COMPLETED | OUTPATIENT
Start: 2019-12-21 | End: 2019-12-21

## 2019-12-21 RX ADMIN — HEPARIN SODIUM 5000 UNIT(S): 5000 INJECTION INTRAVENOUS; SUBCUTANEOUS at 06:00

## 2019-12-21 RX ADMIN — Medication 10 MILLIGRAM(S): at 06:00

## 2019-12-21 RX ADMIN — Medication 50 MILLIGRAM(S): at 06:00

## 2019-12-21 RX ADMIN — Medication 40 MILLIEQUIVALENT(S): at 13:43

## 2019-12-21 RX ADMIN — AMLODIPINE BESYLATE 10 MILLIGRAM(S): 2.5 TABLET ORAL at 06:00

## 2019-12-21 RX ADMIN — HEPARIN SODIUM 5000 UNIT(S): 5000 INJECTION INTRAVENOUS; SUBCUTANEOUS at 17:28

## 2019-12-21 RX ADMIN — MEROPENEM 100 MILLIGRAM(S): 1 INJECTION INTRAVENOUS at 13:40

## 2019-12-21 RX ADMIN — Medication 50 MILLIGRAM(S): at 17:25

## 2019-12-21 RX ADMIN — SODIUM CHLORIDE 125 MILLILITER(S): 9 INJECTION, SOLUTION INTRAVENOUS at 10:00

## 2019-12-21 RX ADMIN — Medication 10 MILLIGRAM(S): at 13:48

## 2019-12-21 RX ADMIN — MEROPENEM 100 MILLIGRAM(S): 1 INJECTION INTRAVENOUS at 21:34

## 2019-12-21 RX ADMIN — MEROPENEM 100 MILLIGRAM(S): 1 INJECTION INTRAVENOUS at 06:00

## 2019-12-21 NOTE — CONSULT NOTE ADULT - PROBLEM SELECTOR PROBLEM 2
MACK (acute kidney injury)

## 2019-12-21 NOTE — CONSULT NOTE ADULT - PROBLEM SELECTOR RECOMMENDATION 9
Sepsis POA; resolved   POD 3 laparoscopic appendectomy   Abscess culture: E. coli sensitive to Merrem   ID on board  cont w/ incentive spirometry  NPO, Bal drain; mgmt as per surgery

## 2019-12-21 NOTE — PROGRESS NOTE ADULT - SUBJECTIVE AND OBJECTIVE BOX
SURGERY PROGRESS HPI:  S/P Laparoscopic Appendectomy POD#3  Pt seen and examined at bedside. Denies pain or complaints. Pt did not have any clear liquids yet. Pt denies nausea and vomiting. +Flatus. +3 BMs overnight. Pt denies chest pain, SOB, dizziness, fever, chills.     Vital Signs Last 24 Hrs  T(C): 36.2 (21 Dec 2019 05:15), Max: 37 (20 Dec 2019 17:33)  T(F): 97.2 (21 Dec 2019 05:15), Max: 98.6 (20 Dec 2019 17:33)  HR: 108 (21 Dec 2019 05:15) (102 - 108)  BP: 159/98 (21 Dec 2019 05:15) (153/100 - 159/98)  BP(mean): --  RR: 18 (21 Dec 2019 05:15) (17 - 18)  SpO2: 95% (21 Dec 2019 05:15) (92% - 99%)      PHYSICAL EXAM:  GENERAL: Alert, NAD  CHEST/LUNG: Clear to auscultation bilaterally, respirations nonlabored  HEART: S1S2, Regular rate and rhythm  ABDOMEN: Steri strips C/D/I. ARI with serous output. Soft improved distention, +bowel sounds, soft, Appropriate incisional tenderness  EXTREMITIES: no calf tenderness, No edema, intermittent compression devices in place bilaterally    I&O's Detail    19 Dec 2019 07:01  -  20 Dec 2019 07:00  --------------------------------------------------------  IN:    lactated ringers.: 1500 mL    Solution: 200 mL    Solution: 100 mL  Total IN: 1800 mL    OUT:    Bulb: 35 mL    Voided: 3050 mL  Total OUT: 3085 mL    Total NET: -1285 mL      20 Dec 2019 07:01  -  21 Dec 2019 06:04  --------------------------------------------------------  IN:    lactated ringers.: 900 mL    Solution: 100 mL  Total IN: 1000 mL    OUT:    Bulb: 10 mL    Voided: 1100 mL  Total OUT: 1110 mL    Total NET: -110 mL      LABS:                        14.4   14.66 )-----------( 183      ( 20 Dec 2019 06:59 )             42.9     12-20    141  |  107  |  21  ----------------------------<  65<L>  3.4<L>   |  23  |  1.29    Ca    9.3      20 Dec 2019 06:59  Phos  2.3     12-20  Mg     1.8     12-20    Culture Results:   Few Escherichia coli (12-19 @ 06:57)  Culture Results:   <10,000 CFU/mL Normal Urogenital Iqra (12-18 @ 09:09)      Assessment: 65 y/o male with acute gangrenous appendicitis with perforation and Abscess. S/P Laparoscopic Appendectomy POD#3 +BM/flatus.  Abscess cx: E. coli    Plan:  - Clear liquid diet. IV hydration  - local wound care  - ARI care. Monitor output  - DVT prophylaxis, Incentive Spirometer, OOB, Ambulating, pain control  - Continue abx per ID. Pt is now on Merrem.   - f/u labs   - Will discuss with attending

## 2019-12-21 NOTE — CONSULT NOTE ADULT - PROBLEM SELECTOR RECOMMENDATION 2
Improving, Most likely dehydration secondary to anorexia   cont w/ IVF and follow renal function and electrolytes.
resolved with IV hydration   consider discontinuing IVF as blood pressure is getting uncontrolled.
Improving, Most likely dehydration secondary to anorexia   cont w/ IVF and follow renal function and electrolytes.
Most likely dehydration secondary to anorexia   cont w/ IVF and follow renal function and electrolytes.

## 2019-12-21 NOTE — CONSULT NOTE ADULT - REASON FOR ADMISSION
acute appendicitis, Abdominal pain, + CT scan
acute appendicitis
acute appendicitis, Abdominal pain, + CT scan

## 2019-12-21 NOTE — CONSULT NOTE ADULT - SUBJECTIVE AND OBJECTIVE BOX
Patient is a 64y old  Male who presents with a chief complaint of acute appendicitis, Abdominal pain, + CT scan (21 Dec 2019 06:03)      INTERVAL HPI/ OVERNIGHT EVENTS: Pt was seen and examined at bedside today, No significant overnight events, pt has moved his bowels, other than surgical wound tenderness he denies any complaints.      MEDICATIONS  (STANDING):  amLODIPine   Tablet 10 milliGRAM(s) Oral daily  bisacodyl Suppository 10 milliGRAM(s) Rectal once  heparin  Injectable 5000 Unit(s) SubCutaneous every 12 hours  influenza   Vaccine 0.5 milliLiter(s) IntraMuscular once  lactated ringers. 1000 milliLiter(s) (125 mL/Hr) IV Continuous <Continuous>  meropenem  IVPB 1000 milliGRAM(s) IV Intermittent every 8 hours  metoprolol tartrate 50 milliGRAM(s) Oral two times a day    MEDICATIONS  (PRN):  acetaminophen  IVPB .. 1000 milliGRAM(s) IV Intermittent every 6 hours PRN Mild Pain (1 - 3)  benzocaine 15 mG/menthol 3.6 mG (Sugar-Free) Lozenge 1 Lozenge Oral every 3 hours PRN Sore Throat  ketorolac   Injectable 15 milliGRAM(s) IV Push every 6 hours PRN Moderate Pain (4 - 6)  ondansetron Injectable 4 milliGRAM(s) IV Push every 6 hours PRN Nausea and/or Vomiting      Allergies    No Known Allergies    Intolerances        REVIEW OF SYSTEMS:    Unable to examine due to [ ] Encephalopathy [ ] Advanced Dementia [ ] Expressive Aphasia [ ] Non-verbal patient    CONSTITUTIONAL: No fever, NO generalized weakness/Fatigue, No weight loss  EYES: No eye pain, visual disturbances, or discharge  ENMT:  No difficulty hearing, tinnitus, vertigo; No sinus or throat pain  NECK: No pain or stiffness  RESPIRATORY: No shortness of breath,  cough, wheezing, sputum or hemoptysis   CARDIOVASCULAR: No chest pain, palpitations, or leg swelling  GASTROINTESTINAL: No abdominal pain. No nausea, vomiting, diarrhea or constipation. No melena or hematochezia.  GENITOURINARY: No dysuria, frequency, hematuria, or incontinence  NEUROLOGICAL: No headaches, Dizziness, memory loss, loss of strength, numbness, or tremors  SKIN: No itching, burning, rashes, or lesions   MUSCULOSKELETAL: No joint pain or swelling; No muscle, back, or extremity pain  PSYCHIATRIC: No depression, anxiety, mood swings, or difficulty sleeping  HEME/LYMPH: No easy bruising, or bleeding gums      Vital Signs Last 24 Hrs  T(C): 36 (21 Dec 2019 11:44), Max: 37 (20 Dec 2019 17:33)  T(F): 96.8 (21 Dec 2019 11:44), Max: 98.6 (20 Dec 2019 17:33)  HR: 102 (21 Dec 2019 11:44) (102 - 108)  BP: 147/84 (21 Dec 2019 11:44) (147/84 - 159/98)  BP(mean): --  RR: 17 (21 Dec 2019 11:44) (17 - 18)  SpO2: 97% (21 Dec 2019 11:44) (92% - 99%)    PHYSICAL EXAM:  GENERAL: NAD, well-developed, well-groomed  HEAD:  Atraumatic, Normocephalic  EYES: conjunctiva and sclera clear  ENMT: Moist mucous membranes  NECK: Supple, No JVD, Normal thyroid  CHEST/LUNG: Clear to Auscultation bilaterally; No rales, rhonchi, wheezing, or rubs  HEART: Regular rate and rhythm; No murmurs, rubs, or gallops  ABDOMEN: Bal drain, Soft, Nontender, Nondistended; Bowel sounds present  EXTREMITIES:  2+ Peripheral Pulses, No clubbing, cyanosis, or edema  SKIN: laparoscopic surgical wounds on abdomen   NERVOUS SYSTEM:  Alert & Oriented X3, Good concentration; Motor Strength 5/5 B/L upper and lower extremities        LABS:                        14.7   12.55 )-----------( 217      ( 21 Dec 2019 07:18 )             42.4     12-21    141  |  106  |  23  ----------------------------<  74  3.2<L>   |  22  |  1.06    Ca    9.2      21 Dec 2019 07:18  Phos  2.5     12-21  Mg     2.1     12-21          CAPILLARY BLOOD GLUCOSE            Culture - Abscess with Gram Stain (collected 12-19-19)  Source: .Abscess PERITONEAL ABSCESS  Preliminary Report (12-20-19):    Few Escherichia coli  Organism: Escherichia coli  Escherichia coli (12-21-19)  Organism: Escherichia coli (12-21-19)    Sensitivities:      -  Amikacin: S <=16      -  Amoxicillin/Clavulanic Acid: S <=8/4      -  Ampicillin: R >16 These ampicillin results predict results for amoxicillin      -  Ampicillin/Sulbactam: R >16/8 Enterobacter, Citrobacter, and Serratia may develop resistance during prolonged therapy (3-4 days)      -  Aztreonam: S <=4      -  Cefazolin: R 8 Enterobacter, Citrobacter, and Serratia may develop resistance during prolonged therapy (3-4 days)      -  Cefepime: S <=2      -  Cefoxitin: S <=8      -  Ceftriaxone: S <=1 Enterobacter, Citrobacter, and Serratia may develop resistance during prolonged therapy      -  Ciprofloxacin: S <=1      -  Ertapenem: S <=0.5      -  Gentamicin: S <=2      -  Imipenem: S <=1      -  Levofloxacin: S <=2      -  Meropenem: S <=1      -  Piperacillin/Tazobactam: S <=8      -  Tobramycin: S <=2      -  Trimethoprim/Sulfamethoxazole: S <=2/38      Method Type: AMELIE  Organism: Escherichia coli (12-21-19)    Sensitivities:      -  Amikacin: S <=16      -  Amoxicillin/Clavulanic Acid: S <=8/4      -  Ampicillin: R >16 These ampicillin results predict results for amoxicillin      -  Ampicillin/Sulbactam: R >16/8 Enterobacter, Citrobacter, and Serratia may develop resistance during prolonged therapy (3-4 days)      -  Aztreonam: S <=4      -  Cefazolin: S <=2 Enterobacter, Citrobacter, and Serratia may develop resistance during prolonged therapy (3-4 days)      -  Cefepime: S <=2      -  Cefoxitin: S <=8      -  Ceftriaxone: S <=1 Enterobacter, Citrobacter, and Serratia may develop resistance during prolonged therapy      -  Ciprofloxacin: S <=1      -  Ertapenem: S <=0.5      -  Gentamicin: S <=2      -  Imipenem: S <=1      -  Levofloxacin: S <=2      -  Meropenem: S <=1      -  Piperacillin/Tazobactam: S <=8      -  Tobramycin: S <=2      -  Trimethoprim/Sulfamethoxazole: S <=2/38      Method Type: AMELIE    Culture - Urine (collected 12-18-19)  Source: .Urine Clean Catch (Midstream)  Final Report (12-19-19):    <10,000 CFU/mL Normal Urogenital Iqra        RADIOLOGY & ADDITIONAL TESTS:          Imaging Personally Reviewed:  [ ] YES  [ ] NO    Consultant(s) Notes Reviewed:  [x ] YES  [ ] NO    Care Discussed with Consultants/Other Providers [x ] YES  [ ] NO

## 2019-12-22 LAB
ANION GAP SERPL CALC-SCNC: 7 MMOL/L — SIGNIFICANT CHANGE UP (ref 5–17)
BUN SERPL-MCNC: 12 MG/DL — SIGNIFICANT CHANGE UP (ref 7–23)
CALCIUM SERPL-MCNC: 8.9 MG/DL — SIGNIFICANT CHANGE UP (ref 8.5–10.1)
CHLORIDE SERPL-SCNC: 107 MMOL/L — SIGNIFICANT CHANGE UP (ref 96–108)
CO2 SERPL-SCNC: 27 MMOL/L — SIGNIFICANT CHANGE UP (ref 22–31)
CREAT SERPL-MCNC: 1 MG/DL — SIGNIFICANT CHANGE UP (ref 0.5–1.3)
GLUCOSE SERPL-MCNC: 115 MG/DL — HIGH (ref 70–99)
HCT VFR BLD CALC: 39.8 % — SIGNIFICANT CHANGE UP (ref 39–50)
HGB BLD-MCNC: 13.7 G/DL — SIGNIFICANT CHANGE UP (ref 13–17)
MCHC RBC-ENTMCNC: 29.7 PG — SIGNIFICANT CHANGE UP (ref 27–34)
MCHC RBC-ENTMCNC: 34.4 GM/DL — SIGNIFICANT CHANGE UP (ref 32–36)
MCV RBC AUTO: 86.1 FL — SIGNIFICANT CHANGE UP (ref 80–100)
NRBC # BLD: 0 /100 WBCS — SIGNIFICANT CHANGE UP (ref 0–0)
PLATELET # BLD AUTO: 246 K/UL — SIGNIFICANT CHANGE UP (ref 150–400)
POTASSIUM SERPL-MCNC: 3.3 MMOL/L — LOW (ref 3.5–5.3)
POTASSIUM SERPL-SCNC: 3.3 MMOL/L — LOW (ref 3.5–5.3)
RBC # BLD: 4.62 M/UL — SIGNIFICANT CHANGE UP (ref 4.2–5.8)
RBC # FLD: 14.4 % — SIGNIFICANT CHANGE UP (ref 10.3–14.5)
SODIUM SERPL-SCNC: 141 MMOL/L — SIGNIFICANT CHANGE UP (ref 135–145)
WBC # BLD: 9.01 K/UL — SIGNIFICANT CHANGE UP (ref 3.8–10.5)
WBC # FLD AUTO: 9.01 K/UL — SIGNIFICANT CHANGE UP (ref 3.8–10.5)

## 2019-12-22 RX ORDER — POTASSIUM CHLORIDE 20 MEQ
20 PACKET (EA) ORAL
Refills: 0 | Status: COMPLETED | OUTPATIENT
Start: 2019-12-22 | End: 2019-12-22

## 2019-12-22 RX ADMIN — HEPARIN SODIUM 5000 UNIT(S): 5000 INJECTION INTRAVENOUS; SUBCUTANEOUS at 05:25

## 2019-12-22 RX ADMIN — MEROPENEM 100 MILLIGRAM(S): 1 INJECTION INTRAVENOUS at 22:00

## 2019-12-22 RX ADMIN — MEROPENEM 100 MILLIGRAM(S): 1 INJECTION INTRAVENOUS at 05:25

## 2019-12-22 RX ADMIN — Medication 50 MILLIGRAM(S): at 05:25

## 2019-12-22 RX ADMIN — HEPARIN SODIUM 5000 UNIT(S): 5000 INJECTION INTRAVENOUS; SUBCUTANEOUS at 16:35

## 2019-12-22 RX ADMIN — Medication 50 MILLIGRAM(S): at 16:36

## 2019-12-22 RX ADMIN — Medication 20 MILLIEQUIVALENT(S): at 10:54

## 2019-12-22 RX ADMIN — SODIUM CHLORIDE 125 MILLILITER(S): 9 INJECTION, SOLUTION INTRAVENOUS at 11:01

## 2019-12-22 RX ADMIN — SODIUM CHLORIDE 125 MILLILITER(S): 9 INJECTION, SOLUTION INTRAVENOUS at 05:25

## 2019-12-22 RX ADMIN — Medication 20 MILLIEQUIVALENT(S): at 12:25

## 2019-12-22 RX ADMIN — AMLODIPINE BESYLATE 10 MILLIGRAM(S): 2.5 TABLET ORAL at 05:25

## 2019-12-22 RX ADMIN — MEROPENEM 100 MILLIGRAM(S): 1 INJECTION INTRAVENOUS at 13:31

## 2019-12-22 NOTE — PROGRESS NOTE ADULT - SUBJECTIVE AND OBJECTIVE BOX
Patient seen and examined at bedside in no distress.   #[id number] used.   No complaints offered.  Tolerating liquid diet; denies abdominal pain, nausea, vomiting.  +Flatus/BM.  Has been ambulating frequently.  Denies fever, chills, chest pain, sob.    Vital Signs Last 24 Hrs  T(F): 98.5 (12-22-19 @ 05:03), Max: 99.3 (12-21-19 @ 17:06)  HR: 92 (12-22-19 @ 05:03)  BP: 150/94 (12-22-19 @ 05:03)  RR: 17 (12-22-19 @ 05:03)  SpO2: 95% (12-22-19 @ 05:03)    GENERAL: Alert, oriented, NAD  CHEST/LUNG: Clear to auscultation bilaterally, respirations nonlabored  HEART: S1S2, RRR  ABDOMEN: + Bowel sounds. Steri strips intact over port sites, no surrounding erythema/tenderness. Soft, NTND  EXTREMITIES: No pedal edema b/l       LABS:                        13.7   9.01  )-----------( 246      ( 22 Dec 2019 07:01 )             39.8     12-22    141  |  107  |  12  ----------------------------<  115<H>  3.3<L>   |  27  |  1.00    Ca    8.9      22 Dec 2019 07:01  Phos  2.5     12-21  Mg     2.1     12-21    A/P: 64M POD4 s/p lap appy 2/2 acute gangrenous appendicitis with perforation and abscess. Afebrile, leukocytosis resolved.   - advance diet as tolerated  - continue antibiotics per ID, await home recommendations  - post op care; DVT ppx, ambulate frequently, incentive spirometer  - will d/w surgeon Patient seen and examined at bedside in no distress.   #[id number] used.   No complaints offered.  Tolerating liquid diet; denies abdominal pain, nausea, vomiting.  +Flatus/BM.  Has been ambulating frequently.  Denies fever, chills, chest pain, sob.    Vital Signs Last 24 Hrs  T(F): 98.5 (12-22-19 @ 05:03), Max: 99.3 (12-21-19 @ 17:06)  HR: 92 (12-22-19 @ 05:03)  BP: 150/94 (12-22-19 @ 05:03)  RR: 17 (12-22-19 @ 05:03)  SpO2: 95% (12-22-19 @ 05:03)    GENERAL: Alert, oriented, NAD  CHEST/LUNG: Clear to auscultation bilaterally, respirations nonlabored  HEART: S1S2, RRR  ABDOMEN: + Bowel sounds. Steri strips intact over port sites, no surrounding erythema/tenderness. Soft, NTND  EXTREMITIES: No pedal edema b/l       LABS:                        13.7   9.01  )-----------( 246      ( 22 Dec 2019 07:01 )             39.8     12-22    141  |  107  |  12  ----------------------------<  115<H>  3.3<L>   |  27  |  1.00    Ca    8.9      22 Dec 2019 07:01  Phos  2.5     12-21  Mg     2.1     12-21    A/P: 64M POD4 s/p lap appy 2/2 acute gangrenous appendicitis with perforation and abscess. Afebrile, leukocytosis resolved. Hypokalemia.   - replete K  - advance diet as tolerated  - continue antibiotics per ID, await home recommendations  - post op care; DVT ppx, ambulate frequently, incentive spirometer  - will d/w surgeon

## 2019-12-23 VITALS
OXYGEN SATURATION: 94 % | SYSTOLIC BLOOD PRESSURE: 131 MMHG | TEMPERATURE: 98 F | HEART RATE: 92 BPM | DIASTOLIC BLOOD PRESSURE: 87 MMHG | RESPIRATION RATE: 17 BRPM

## 2019-12-23 LAB
ANION GAP SERPL CALC-SCNC: 4 MMOL/L — LOW (ref 5–17)
BUN SERPL-MCNC: 10 MG/DL — SIGNIFICANT CHANGE UP (ref 7–23)
CALCIUM SERPL-MCNC: 9.4 MG/DL — SIGNIFICANT CHANGE UP (ref 8.5–10.1)
CHLORIDE SERPL-SCNC: 104 MMOL/L — SIGNIFICANT CHANGE UP (ref 96–108)
CO2 SERPL-SCNC: 32 MMOL/L — HIGH (ref 22–31)
CREAT SERPL-MCNC: 1.1 MG/DL — SIGNIFICANT CHANGE UP (ref 0.5–1.3)
GLUCOSE SERPL-MCNC: 89 MG/DL — SIGNIFICANT CHANGE UP (ref 70–99)
HCT VFR BLD CALC: 40.7 % — SIGNIFICANT CHANGE UP (ref 39–50)
HGB BLD-MCNC: 13.9 G/DL — SIGNIFICANT CHANGE UP (ref 13–17)
MAGNESIUM SERPL-MCNC: 1.9 MG/DL — SIGNIFICANT CHANGE UP (ref 1.6–2.6)
MCHC RBC-ENTMCNC: 30.3 PG — SIGNIFICANT CHANGE UP (ref 27–34)
MCHC RBC-ENTMCNC: 34.2 GM/DL — SIGNIFICANT CHANGE UP (ref 32–36)
MCV RBC AUTO: 88.7 FL — SIGNIFICANT CHANGE UP (ref 80–100)
NRBC # BLD: 0 /100 WBCS — SIGNIFICANT CHANGE UP (ref 0–0)
PHOSPHATE SERPL-MCNC: 2.5 MG/DL — SIGNIFICANT CHANGE UP (ref 2.5–4.5)
PLATELET # BLD AUTO: 244 K/UL — SIGNIFICANT CHANGE UP (ref 150–400)
POTASSIUM SERPL-MCNC: 3.6 MMOL/L — SIGNIFICANT CHANGE UP (ref 3.5–5.3)
POTASSIUM SERPL-SCNC: 3.6 MMOL/L — SIGNIFICANT CHANGE UP (ref 3.5–5.3)
RBC # BLD: 4.59 M/UL — SIGNIFICANT CHANGE UP (ref 4.2–5.8)
RBC # FLD: 14.5 % — SIGNIFICANT CHANGE UP (ref 10.3–14.5)
SODIUM SERPL-SCNC: 140 MMOL/L — SIGNIFICANT CHANGE UP (ref 135–145)
WBC # BLD: 8.79 K/UL — SIGNIFICANT CHANGE UP (ref 3.8–10.5)
WBC # FLD AUTO: 8.79 K/UL — SIGNIFICANT CHANGE UP (ref 3.8–10.5)

## 2019-12-23 RX ORDER — METRONIDAZOLE 500 MG
1 TABLET ORAL
Qty: 84 | Refills: 0
Start: 2019-12-23 | End: 2020-01-19

## 2019-12-23 RX ORDER — MOXIFLOXACIN HYDROCHLORIDE TABLETS, 400 MG 400 MG/1
1 TABLET, FILM COATED ORAL
Qty: 56 | Refills: 0
Start: 2019-12-23 | End: 2020-01-19

## 2019-12-23 RX ORDER — METRONIDAZOLE 500 MG
500 TABLET ORAL EVERY 8 HOURS
Refills: 0 | Status: DISCONTINUED | OUTPATIENT
Start: 2019-12-23 | End: 2019-12-23

## 2019-12-23 RX ORDER — CIPROFLOXACIN LACTATE 400MG/40ML
500 VIAL (ML) INTRAVENOUS EVERY 12 HOURS
Refills: 0 | Status: DISCONTINUED | OUTPATIENT
Start: 2019-12-23 | End: 2019-12-23

## 2019-12-23 RX ADMIN — Medication 50 MILLIGRAM(S): at 05:12

## 2019-12-23 RX ADMIN — AMLODIPINE BESYLATE 10 MILLIGRAM(S): 2.5 TABLET ORAL at 05:12

## 2019-12-23 RX ADMIN — Medication 500 MILLIGRAM(S): at 13:42

## 2019-12-23 RX ADMIN — MEROPENEM 100 MILLIGRAM(S): 1 INJECTION INTRAVENOUS at 05:12

## 2019-12-23 RX ADMIN — Medication 500 MILLIGRAM(S): at 17:58

## 2019-12-23 RX ADMIN — Medication 50 MILLIGRAM(S): at 17:58

## 2019-12-23 RX ADMIN — HEPARIN SODIUM 5000 UNIT(S): 5000 INJECTION INTRAVENOUS; SUBCUTANEOUS at 05:12

## 2019-12-23 RX ADMIN — SODIUM CHLORIDE 125 MILLILITER(S): 9 INJECTION, SOLUTION INTRAVENOUS at 05:12

## 2019-12-23 NOTE — DISCHARGE NOTE PROVIDER - NSDCMRMEDTOKEN_GEN_ALL_CORE_FT
amLODIPine 10 mg oral tablet: 1 tab(s) orally once a day  aspirin 81 mg oral delayed release tablet: 1 tab(s) orally once a day  isoniazid 300 mg oral tablet: 1 tab(s) orally once a day   metoprolol tartrate 50 mg oral tablet: 1 tab(s) orally 2 times a day  Zocor 20 mg oral tablet: 1 tab(s) orally once a day (at bedtime) amLODIPine 10 mg oral tablet: 1 tab(s) orally once a day  aspirin 81 mg oral delayed release tablet: 1 tab(s) orally once a day  Cipro 500 mg oral tablet: 1 tab(s) orally every 12 hours MDD:2 tabs  Flagyl 500 mg oral tablet: 1 tab(s) orally every 8 hours MDD:3 tabs  metoprolol tartrate 50 mg oral tablet: 1 tab(s) orally 2 times a day  Zocor 20 mg oral tablet: 1 tab(s) orally once a day (at bedtime)

## 2019-12-23 NOTE — DISCHARGE NOTE PROVIDER - HOSPITAL COURSE
Patient is a 64 year old male with PMH HTN, HLD who presents to the ER c/o severe, intermittent, 10/10 RLQ pain x 3 days.      CT showed acute appendicitis.    Patient was taken to the OR on 12/18 for a lap appy and was found to have gangrenous appendicitis with a contained perforation.    ID was consulted and patient was continued on IV antibiotics.    When patient was tolerating a regular diet, he was pain-free, and hemodynamically stable, he was discharged home. Patient is a 64 year old male with PMH HTN, HLD who presents to the ER c/o severe, intermittent, 10/10 RLQ pain x 3 days.      CT showed acute appendicitis. Admitted with Dx of Acute Appendicitis, R/O sepsis.    Patient was taken to the OR on 12/18 for a lap appy and was found to have gangrenous appendicitis with a contained perforation. Peritoneal Abscess.    ID was consulted and patient was continued on IV antibiotics. Changed from Zosyn to Meropenem. Leukocytosis improved.    When patient was tolerating a regular diet, he was pain-free, afebrile,  he was discharged home on PO antibiotics.

## 2019-12-23 NOTE — PROGRESS NOTE ADULT - REASON FOR ADMISSION
acute appendicitis, Abdominal pain, + CT scan

## 2019-12-23 NOTE — PROGRESS NOTE ADULT - SUBJECTIVE AND OBJECTIVE BOX
HPI:  Patient is a 64 year old male with PMH HTN, HLD who presents to the Emergency Room  c/o severe, intermittent, 10/10 RLQ pain starting 3 days ago, getting worse last night. Admits to associated anorexia. Reports occasional relief from pain when laying still. States pain is exacerbated with movement. Denies fever, chills, chest pain, sob, dysuria, change in bowel habits. As per patient has BPH as per Urologist, who recommended Surgical intervention,  but he declined.  ER CT scan of abdomen  revealed Acute Appendicitis. Blood tests revealed elevated Cr. Patient don't  Know if suffers from Renal disfunction (18 Dec 2019 07:47)      Allergies    No Known Allergies    Intolerances        MEDICATIONS  (STANDING):  amLODIPine   Tablet 10 milliGRAM(s) Oral daily  bisacodyl Suppository 10 milliGRAM(s) Rectal once  ciprofloxacin     Tablet 500 milliGRAM(s) Oral every 12 hours  heparin  Injectable 5000 Unit(s) SubCutaneous every 12 hours  influenza   Vaccine 0.5 milliLiter(s) IntraMuscular once  metoprolol tartrate 50 milliGRAM(s) Oral two times a day  metroNIDAZOLE    Tablet 500 milliGRAM(s) Oral every 8 hours    MEDICATIONS  (PRN):  acetaminophen  IVPB .. 1000 milliGRAM(s) IV Intermittent every 6 hours PRN Mild Pain (1 - 3)  benzocaine 15 mG/menthol 3.6 mG (Sugar-Free) Lozenge 1 Lozenge Oral every 3 hours PRN Sore Throat  ketorolac   Injectable 15 milliGRAM(s) IV Push every 6 hours PRN Moderate Pain (4 - 6)  ondansetron Injectable 4 milliGRAM(s) IV Push every 6 hours PRN Nausea and/or Vomiting      REVIEW OF SYSTEMS:    CONSTITUTIONAL: No fever, chills, weight loss, or fatigue  HEENT: No sore throat, runny nose, ear ache  RESPIRATORY: No cough, wheezing, No shortness of breath  CARDIOVASCULAR: No chest pain, palpitations, dizziness  GASTROINTESTINAL: No abdominal pain. No nausea, vomiting, diarrhea  GENITOURINARY: No dysuria, increase frequency, hematuria, or incontinence  NEUROLOGICAL: No headaches, memory loss, loss of strength, numbness, or tremors, no weakness  EXTREMITY: No pedal edema BLE  SKIN: No itching, burning, rashes, or lesions     VITAL SIGNS:  T(C): 37 (12-23-19 @ 05:00), Max: 37.1 (12-22-19 @ 17:37)  T(F): 98.6 (12-23-19 @ 05:00), Max: 98.8 (12-22-19 @ 17:37)  HR: 93 (12-23-19 @ 05:00) (87 - 95)  BP: 148/92 (12-23-19 @ 05:00) (137/84 - 164/98)  RR: 148 (12-23-19 @ 05:00) (16 - 148)  SpO2: 96% (12-23-19 @ 05:00) (94% - 98%)  Wt(kg): --    PHYSICAL EXAM:    GENERAL: not in any distress  HEENT: Neck is supple, normocephalic, atraumatic   CHEST/LUNG: Clear to percussion bilaterally; No rales, rhonchi, wheezing  HEART: Regular rate and rhythm; No murmurs, rubs, or gallops  ABDOMEN: Soft, Nontender, Nondistended; Bowel sounds present, no rebound   EXTREMITIES:  2+ Peripheral Pulses, No clubbing, cyanosis, or edema  GENITOURINARY:   SKIN: No rashes or lesions  BACK: no pressor sore   NERVOUS SYSTEM:  Alert & Oriented X3    LABS:                         13.9   8.79  )-----------( 244      ( 23 Dec 2019 08:29 )             40.7     12-23    140  |  104  |  10  ----------------------------<  89  3.6   |  32<H>  |  1.10    Ca    9.4      23 Dec 2019 08:29  Phos  2.5     12-23  Mg     1.9     12-23                                Culture Results:   Few Escherichia coli Multiple Morphological Strains  Few Bacteroides fragilis "Susceptibilities not performed" (12-19 @ 06:57)  Culture Results:   <10,000 CFU/mL Normal Urogenital Iqra (12-18 @ 09:09)                Radiology:

## 2019-12-23 NOTE — DISCHARGE NOTE NURSING/CASE MANAGEMENT/SOCIAL WORK - PATIENT PORTAL LINK FT
You can access the FollowMyHealth Patient Portal offered by [facility] by registering at the following website: [email or web address]. By joining [facility]’s FollowMyHealth portal, you will also be able to view your health information using other applications (apps) compatible with our system.

## 2019-12-23 NOTE — DISCHARGE NOTE PROVIDER - CARE PROVIDER_API CALL
Gerardo Herrera (DO)  Surgery  [address] [place] [address]  Phone: [phone]  Fax: [phone]  Follow Up Time:

## 2019-12-23 NOTE — PROGRESS NOTE ADULT - SUBJECTIVE AND OBJECTIVE BOX
Surgery NP note  Patient seen and examined bedside resting comfortably.  No complaints offered. Abdominal pain is well controlled.  Denies nausea and vomiting. Tolerating diet.  Normal flatus/BM.     T(F): 98.6 (12-23-19 @ 05:00), Max: 98.8 (12-22-19 @ 17:37)  HR: 93 (12-23-19 @ 05:00) (87 - 95)  BP: 148/92 (12-23-19 @ 05:00) (137/84 - 164/98)  RR: 148 (12-23-19 @ 05:00) (16 - 148)  SpO2: 96% (12-23-19 @ 05:00) (94% - 98%)  Wt(kg): --  CAPILLARY BLOOD GLUCOSE          PHYSICAL EXAM:  General: NAD, WDWN.   Neuro:  Alert & responsive  HEENT: NCAT, EOMI, conjunctiva clear  CV: +S1+S2 regular rate and rhythm  Lung: clear to ausculation bilaterally, respirations nonlabored, good inspiratory effort  Abdomen: soft, Steri strips intact over port sites, no surrounding erythema/tenderness   Extremities: no pedal edema or calf tenderness noted     LABS:                        13.7   9.01  )-----------( 246      ( 22 Dec 2019 07:01 )             39.8     12-22    141  |  107  |  12  ----------------------------<  115<H>  3.3<L>   |  27  |  1.00    Ca    8.9      22 Dec 2019 07:01          I&O's Detail    22 Dec 2019 07:01  -  23 Dec 2019 07:00  --------------------------------------------------------  IN:    lactated ringers.: 1500 mL    Oral Fluid: 800 mL    Solution: 100 mL  Total IN: 2400 mL    OUT:    Bulb: 125 mL    Voided: 1400 mL  Total OUT: 1525 mL    Total NET: 875 mL    A/P: 64M POD5 s/p lap appy 2/2 acute gangrenous appendicitis with perforation and abscess. Afebrile, leukocytosis resolved. Hypokalemia.     - D/C planing for home today pending ID reccs  - post op care; DVT ppx, ambulate frequently, incentive spirometer  Will discuss with Dr Herrera Surgery NP note  Patient seen and examined bedside resting comfortably.  No complaints offered. Abdominal pain is well controlled.  Denies nausea and vomiting. Tolerating diet.  Normal flatus/BM.     T(F): 98.6 (12-23-19 @ 05:00), Max: 98.8 (12-22-19 @ 17:37)  HR: 93 (12-23-19 @ 05:00) (87 - 95)  BP: 148/92 (12-23-19 @ 05:00) (137/84 - 164/98)  RR: 148 (12-23-19 @ 05:00) (16 - 148)  SpO2: 96% (12-23-19 @ 05:00) (94% - 98%)  Wt(kg): --  CAPILLARY BLOOD GLUCOSE          PHYSICAL EXAM:  General: NAD, WDWN.   Neuro:  Alert & responsive  HEENT: NCAT, EOMI, conjunctiva clear  CV: +S1+S2 regular rate and rhythm  Lung: clear to ausculation bilaterally, respirations nonlabored, good inspiratory effort  Abdomen: soft, Steri strips intact over port sites, no surrounding erythema/tenderness   Extremities: no pedal edema or calf tenderness noted     LABS:                        13.7   9.01  )-----------( 246      ( 22 Dec 2019 07:01 )             39.8     12-22    141  |  107  |  12  ----------------------------<  115<H>  3.3<L>   |  27  |  1.00    Ca    8.9      22 Dec 2019 07:01          I&O's Detail    22 Dec 2019 07:01  -  23 Dec 2019 07:00  --------------------------------------------------------  IN:    lactated ringers.: 1500 mL    Oral Fluid: 800 mL    Solution: 100 mL  Total IN: 2400 mL    OUT:    Bulb: 125 mL    Voided: 1400 mL  Total OUT: 1525 mL    Total NET: 875 mL    A/P: 64M POD5 s/p lap appy 2/2 acute gangrenous appendicitis with perforation and abscess. Afebrile, leukocytosis resolved. Hypokalemia.     - D/C planing for home today pending ID reccs.  - Replete K+  - post op care; DVT ppx, ambulate frequently, incentive spirometer    Will discuss with Dr Herrera

## 2019-12-23 NOTE — DISCHARGE NOTE PROVIDER - NSDCCPCAREPLAN_GEN_ALL_CORE_FT
PRINCIPAL DISCHARGE DIAGNOSIS  Diagnosis: Acute appendicitis  Assessment and Plan of Treatment: Drink plenty of fluids to prevent constipation and fruit juices without pulp that are high in vitamin C. Rest as needed. Do not lift anything heavier than 10 pounds. You may take motrin or advil for mild pain as needed, in addition to prescribed narcotic medication. You may take over the counter stool softeners as needed. Call for any fever over 101, nausea, vomiting, severe pain, no passing of gas or bowel movement. You may shower and pat dry abdomen. Leave the white steri strips in place; they will fall off in 5-7 days.      SECONDARY DISCHARGE DIAGNOSES  Diagnosis: Acute appendicitis  Assessment and Plan of Treatment:

## 2019-12-23 NOTE — PROGRESS NOTE ADULT - ASSESSMENT
acute gangrenous appendicitis with perforation and abscess S/P Laparoscopic Appendectomy     ON ZOSYN   no fever, with leukocytosis   abscess culture with ecoli sensitivity fairly   smear gram pos cocci  postop leukocytosis resolved   can switch to PO Cipro and flagyl for 4 weeks with probiotics upon discharge   case discussed with pt and team.   tolerating diet   thanks

## 2020-01-01 ENCOUNTER — OUTPATIENT (OUTPATIENT)
Dept: OUTPATIENT SERVICES | Facility: HOSPITAL | Age: 65
LOS: 1 days | End: 2020-01-01
Payer: MEDICAID

## 2020-01-01 PROCEDURE — G9001: CPT

## 2020-01-02 DIAGNOSIS — Z71.89 OTHER SPECIFIED COUNSELING: ICD-10-CM

## 2020-01-02 PROBLEM — I10 ESSENTIAL (PRIMARY) HYPERTENSION: Chronic | Status: ACTIVE | Noted: 2019-12-18

## 2020-01-02 PROBLEM — E78.5 HYPERLIPIDEMIA, UNSPECIFIED: Chronic | Status: ACTIVE | Noted: 2019-12-18

## 2020-01-07 DIAGNOSIS — N62 HYPERTROPHY OF BREAST: ICD-10-CM

## 2020-01-07 DIAGNOSIS — D72.829 ELEVATED WHITE BLOOD CELL COUNT, UNSPECIFIED: ICD-10-CM

## 2020-01-07 DIAGNOSIS — K35.33 ACUTE APPENDICITIS WITH PERFORATION, LOCALIZED PERITONITIS, AND GANGRENE, WITH ABSCESS: ICD-10-CM

## 2020-01-07 DIAGNOSIS — N40.0 BENIGN PROSTATIC HYPERPLASIA WITHOUT LOWER URINARY TRACT SYMPTOMS: ICD-10-CM

## 2020-01-07 DIAGNOSIS — N17.9 ACUTE KIDNEY FAILURE, UNSPECIFIED: ICD-10-CM

## 2020-01-07 DIAGNOSIS — I10 ESSENTIAL (PRIMARY) HYPERTENSION: ICD-10-CM

## 2020-01-07 DIAGNOSIS — J98.11 ATELECTASIS: ICD-10-CM

## 2020-01-07 DIAGNOSIS — R63.0 ANOREXIA: ICD-10-CM

## 2020-01-07 DIAGNOSIS — E78.5 HYPERLIPIDEMIA, UNSPECIFIED: ICD-10-CM

## 2020-01-07 DIAGNOSIS — K56.7 ILEUS, UNSPECIFIED: ICD-10-CM

## 2020-01-07 DIAGNOSIS — E86.0 DEHYDRATION: ICD-10-CM

## 2020-01-07 DIAGNOSIS — Z79.82 LONG TERM (CURRENT) USE OF ASPIRIN: ICD-10-CM

## 2020-01-07 DIAGNOSIS — A41.9 SEPSIS, UNSPECIFIED ORGANISM: ICD-10-CM

## 2020-01-07 DIAGNOSIS — E83.39 OTHER DISORDERS OF PHOSPHORUS METABOLISM: ICD-10-CM

## 2020-01-07 DIAGNOSIS — R00.0 TACHYCARDIA, UNSPECIFIED: ICD-10-CM

## 2020-01-07 DIAGNOSIS — B96.20 UNSPECIFIED ESCHERICHIA COLI [E. COLI] AS THE CAUSE OF DISEASES CLASSIFIED ELSEWHERE: ICD-10-CM

## 2020-01-07 DIAGNOSIS — E87.6 HYPOKALEMIA: ICD-10-CM

## 2020-05-07 NOTE — PROGRESS NOTE ADULT - PROBLEM SELECTOR PLAN 2
cont med Cheek Interpolation Flap Text: A decision was made to reconstruct the defect utilizing an interpolation axial flap and a staged reconstruction.  A telfa template was made of the defect.  This telfa template was then used to outline the Cheek Interpolation flap.  The donor area for the pedicle flap was then injected with anesthesia.  The flap was excised through the skin and subcutaneous tissue down to the layer of the underlying musculature.  The interpolation flap was carefully excised within this deep plane to maintain its blood supply.  The edges of the donor site were undermined.   The donor site was closed in a primary fashion.  The pedicle was then rotated into position and sutured.  Once the tube was sutured into place, adequate blood supply was confirmed with blanching and refill.  The pedicle was then wrapped with xeroform gauze and dressed appropriately with a telfa and gauze bandage to ensure continued blood supply and protect the attached pedicle.

## 2020-05-27 NOTE — PROGRESS NOTE ADULT - SUBJECTIVE AND OBJECTIVE BOX
Call received from elyssa martinez as a follow up from yesterdays hypotension concern.  Patient did have dialysis yesterday and her blood pressures last night were 88/50 and 88/47.   At 0200 this morning, she was at 103/52 and at 0800 her bp was 101/56, please advise.    64 yo male with history of abnormal cxr today in office presents with confusion x coming to [facility] June 5 from [place]. As per sister patient seems confused and verbal not responsive at times (10 Jul 2018 15:20)  hiv NEGATIVE   neuro work up noted  afb x 2 so far NEGATIVE     Allergies    No Known Allergies    Intolerances        MEDICATIONS  (STANDING):  amLODIPine   Tablet 10 milliGRAM(s) Oral daily  aspirin enteric coated 81 milliGRAM(s) Oral daily  cyanocobalamin Injectable 1000 MICROGram(s) IntraMuscular daily  enoxaparin Injectable 40 milliGRAM(s) SubCutaneous every 24 hours  metoprolol tartrate 50 milliGRAM(s) Oral two times a day  sodium chloride 0.45%. 1000 milliLiter(s) (70 mL/Hr) IV Continuous <Continuous>    MEDICATIONS  (PRN):      REVIEW OF SYSTEMS:    alert confused   in nad     VITAL SIGNS:  T(C): 36.3 (07-14-18 @ 11:38), Max: 37.1 (07-13-18 @ 17:28)  T(F): 97.4 (07-14-18 @ 11:38), Max: 98.8 (07-13-18 @ 17:28)  HR: 81 (07-14-18 @ 11:38) (73 - 82)  BP: 152/84 (07-14-18 @ 11:38) (147/95 - 167/104)  RR: 16 (07-14-18 @ 11:38) (16 - 17)  SpO2: 98% (07-14-18 @ 11:38) (97% - 98%)  Wt(kg): --    PHYSICAL EXAM:    GENERAL: not in any distress  HEENT: Neck is supple, normocephalic, atraumatic   CHEST/LUNG: Clear to auscultation bilaterally; No rales, rhonchi, wheezing  HEART: Regular rate and rhythm; No murmurs, rubs, or gallops  ABDOMEN: Soft, Nontender, Nondistended; Bowel sounds present, no rebound   EXTREMITIES:  2+ Peripheral Pulses, No clubbing, cyanosis, or edema  SKIN: No rashes or lesions  BACK: no pressor sore   NERVOUS SYSTEM:  Alert &confused     LABS:                         14.2   4.50  )-----------( 333      ( 14 Jul 2018 07:50 )             43.1     07-14    140  |  105  |  19  ----------------------------<  82  3.8   |  30  |  1.04    Ca    9.2      14 Jul 2018 07:50                  Thyroid Stimulating Hormone, Serum: 2.150 uIU/mL (07-11 @ 07:50)      Sedimentation Rate, Erythrocyte: 10 mm/hr (07-11 @ 07:50)            Culture Results:   No growth (07-10 @ 23:03)          Legionella Antigen, Urine: Negative (07-11 @ 14:52)        Radiology:

## 2021-05-12 NOTE — ED ADULT TRIAGE NOTE - STATUS:
Intact Rituxan Counseling:  I discussed with the patient the risks of Rituxan infusions. Side effects can include infusion reactions, severe drug rashes including mucocutaneous reactions, reactivation of latent hepatitis and other infections and rarely progressive multifocal leukoencephalopathy.  All of the patient's questions and concerns were addressed.

## 2022-08-25 NOTE — ED PROVIDER NOTE - MUSCULOSKELETAL, MLM
Dupixent Pregnancy And Lactation Text: This medication likely crosses the placenta but the risk for the fetus is uncertain. This medication is excreted in breast milk. Ivermectin Pregnancy And Lactation Text: This medication is Pregnancy Category C and it isn't known if it is safe during pregnancy. It is also excreted in breast milk. Olumiant Counseling: I discussed with the patient the risks of Olumiant therapy including but not limited to upper respiratory tract infections, shingles, cold sores, and nausea. Live vaccines should be avoided.  This medication has been linked to serious infections; higher rate of mortality; malignancy and lymphoproliferative disorders; major adverse cardiovascular events; thrombosis; gastrointestinal perforations; neutropenia; lymphopenia; anemia; liver enzyme elevations; and lipid elevations. Glycopyrrolate Counseling:  I discussed with the patient the risks of glycopyrrolate including but not limited to skin rash, drowsiness, dry mouth, difficulty urinating, and blurred vision. Tranexamic Acid Counseling:  Patient advised of the small risk of bleeding problems with tranexamic acid. They were also instructed to call if they developed any nausea, vomiting or diarrhea. All of the patient's questions and concerns were addressed. Rhofade Counseling: Rhofade is a topical medication which can decrease superficial blood flow where applied. Side effects are uncommon and include stinging, redness and allergic reactions. Cantharidin Counseling: Calcipotriene Counseling:  I discussed with the patient the risks of calcipotriene including but not limited to erythema, scaling, itching, and irritation. Nsaids Pregnancy And Lactation Text: These medications are considered safe up to 30 weeks gestation. It is excreted in breast milk. Bexarotene Counseling:  I discussed with the patient the risks of bexarotene including but not limited to hair loss, dry lips/skin/eyes, liver abnormalities, hyperlipidemia, pancreatitis, depression/suicidal ideation, photosensitivity, drug rash/allergic reactions, hypothyroidism, anemia, leukopenia, infection, cataracts, and teratogenicity.  Patient understands that they will need regular blood tests to check lipid profile, liver function tests, white blood cell count, thyroid function tests and pregnancy test if applicable. Aklief Pregnancy And Lactation Text: It is unknown if this medication is safe to use during pregnancy.  It is unknown if this medication is excreted in breast milk.  Breastfeeding women should use the topical cream on the smallest area of the skin for the shortest time needed while breastfeeding.  Do not apply to nipple and areola. Quinolones Pregnancy And Lactation Text: This medication is Pregnancy Category C and it isn't know if it is safe during pregnancy. It is also excreted in breast milk. Griseofulvin Counseling:  I discussed with the patient the risks of griseofulvin including but not limited to photosensitivity, cytopenia, liver damage, nausea/vomiting and severe allergy.  The patient understands that this medication is best absorbed when taken with a fatty meal (e.g., ice cream or french fries). Spine appears normal, range of motion is not limited, no muscle or joint tenderness Zyclara Pregnancy And Lactation Text: This medication is Pregnancy Category C. It is unknown if this medication is excreted in breast milk. Simponi Pregnancy And Lactation Text: The risk during pregnancy and breastfeeding is uncertain with this medication. Mirvaso Pregnancy And Lactation Text: This medication has not been assigned a Pregnancy Risk Category. It is unknown if the medication is excreted in breast milk. Doxycycline Counseling:  Patient counseled regarding possible photosensitivity and increased risk for sunburn.  Patient instructed to avoid sunlight, if possible.  When exposed to sunlight, patients should wear protective clothing, sunglasses, and sunscreen.  The patient was instructed to call the office immediately if the following severe adverse effects occur:  hearing changes, easy bruising/bleeding, severe headache, or vision changes.  The patient verbalized understanding of the proper use and possible adverse effects of doxycycline.  All of the patient's questions and concerns were addressed. Cyclophosphamide Pregnancy And Lactation Text: This medication is Pregnancy Category D and it isn't considered safe during pregnancy. This medication is excreted in breast milk. Propranolol Counseling:  I discussed with the patient the risks of propranolol including but not limited to low heart rate, low blood pressure, low blood sugar, restlessness and increased cold sensitivity. They should call the office if they experience any of these side effects. Dapsone Counseling: I discussed with the patient the risks of dapsone including but not limited to hemolytic anemia, agranulocytosis, rashes, methemoglobinemia, kidney failure, peripheral neuropathy, headaches, GI upset, and liver toxicity.  Patients who start dapsone require monitoring including baseline LFTs and weekly CBCs for the first month, then every month thereafter.  The patient verbalized understanding of the proper use and possible adverse effects of dapsone.  All of the patient's questions and concerns were addressed. Cimetidine Pregnancy And Lactation Text: This medication is Pregnancy Category B and is considered safe during pregnancy. It is also excreted in breast milk and breast feeding isn't recommended. Hydroquinone Counseling:  Patient advised that medication may result in skin irritation, lightening (hypopigmentation), dryness, and burning.  In the event of skin irritation, the patient was advised to reduce the amount of the drug applied or use it less frequently.  Rarely, spots that are treated with hydroquinone can become darker (pseudoochronosis).  Should this occur, patient instructed to stop medication and call the office. The patient verbalized understanding of the proper use and possible adverse effects of hydroquinone.  All of the patient's questions and concerns were addressed. Xeljanz Counseling: I discussed with the patient the risks of Xeljanz therapy including increased risk of infection, liver issues, headache, diarrhea, or cold symptoms. Live vaccines should be avoided. They were instructed to call if they have any problems. Dupixent Counseling: I discussed with the patient the risks of dupilumab including but not limited to eye infection and irritation, cold sores, injection site reactions, worsening of asthma, allergic reactions and increased risk of parasitic infection.  Live vaccines should be avoided while taking dupilumab. Dupilumab will also interact with certain medications such as warfarin and cyclosporine. The patient understands that monitoring is required and they must alert us or the primary physician if symptoms of infection or other concerning signs are noted. Topical Ketoconazole Counseling: Patient counseled that this medication may cause skin irritation or allergic reactions.  In the event of skin irritation, the patient was advised to reduce the amount of the drug applied or use it less frequently.   The patient verbalized understanding of the proper use and possible adverse effects of ketoconazole.  All of the patient's questions and concerns were addressed. Gabapentin Pregnancy And Lactation Text: This medication is Pregnancy Category C and isn't considered safe during pregnancy. It is excreted in breast milk. Thalidomide Pregnancy And Lactation Text: This medication is Pregnancy Category X and is absolutely contraindicated during pregnancy. It is unknown if it is excreted in breast milk. Cantharidin Pregnancy And Lactation Text: The use of this medication during pregnancy or lactation is not recommended as there is insufficient data. Infliximab Pregnancy And Lactation Text: This medication is Pregnancy Category B and is considered safe during pregnancy. It is unknown if this medication is excreted in breast milk. Acitretin Pregnancy And Lactation Text: This medication is Pregnancy Category X and should not be given to women who are pregnant or may become pregnant in the future. This medication is excreted in breast milk. Isotretinoin Counseling: Patient should get monthly blood tests, not donate blood, not drive at night if vision affected, not share medication, and not undergo elective surgery for 6 months after tx completed. Side effects reviewed, pt to contact office should one occur. Rifampin Counseling: I discussed with the patient the risks of rifampin including but not limited to liver damage, kidney damage, red-orange body fluids, nausea/vomiting and severe allergy. Azelaic Acid Counseling: Patient counseled that medicine may cause skin irritation and to avoid applying near the eyes.  In the event of skin irritation, the patient was advised to reduce the amount of the drug applied or use it less frequently.   The patient verbalized understanding of the proper use and possible adverse effects of azelaic acid.  All of the patient's questions and concerns were addressed. Simponi Counseling:  I discussed with the patient the risks of golimumab including but not limited to myelosuppression, immunosuppression, autoimmune hepatitis, demyelinating diseases, lymphoma, and serious infections.  The patient understands that monitoring is required including a PPD at baseline and must alert us or the primary physician if symptoms of infection or other concerning signs are noted. Nsaids Counseling: NSAID Counseling: I discussed with the patient that NSAIDs should be taken with food. Prolonged use of NSAIDs can result in the development of stomach ulcers.  Patient advised to stop taking NSAIDs if abdominal pain occurs.  The patient verbalized understanding of the proper use and possible adverse effects of NSAIDs.  All of the patient's questions and concerns were addressed. Opzelura Counseling:  I discussed with the patient the risks of Opzelura including but not limited to nasopharngitis, bronchitis, ear infection, eosinophila, hives, diarrhea, folliculitis, tonsillitis, and rhinorrhea.  Taken orally, this medication has been linked to serious infections; higher rate of mortality; malignancy and lymphoproliferative disorders; major adverse cardiovascular events; thrombosis; thrombocytopenia, anemia, and neutropenia; and lipid elevations. Cyclophosphamide Counseling:  I discussed with the patient the risks of cyclophosphamide including but not limited to hair loss, hormonal abnormalities, decreased fertility, abdominal pain, diarrhea, nausea and vomiting, bone marrow suppression and infection. The patient understands that monitoring is required while taking this medication. Griseofulvin Pregnancy And Lactation Text: This medication is Pregnancy Category X and is known to cause serious birth defects. It is unknown if this medication is excreted in breast milk but breast feeding should be avoided. Doxepin Counseling:  Patient advised that the medication is sedating and not to drive a car after taking this medication. Patient informed of potential adverse effects including but not limited to dry mouth, urinary retention, and blurry vision.  The patient verbalized understanding of the proper use and possible adverse effects of doxepin.  All of the patient's questions and concerns were addressed. Oxybutynin Pregnancy And Lactation Text: This medication is Pregnancy Category B and is considered safe during pregnancy. It is unknown if it is excreted in breast milk. Doxycycline Pregnancy And Lactation Text: This medication is Pregnancy Category D and not consider safe during pregnancy. It is also excreted in breast milk but is considered safe for shorter treatment courses. Hydroquinone Pregnancy And Lactation Text: This medication has not been assigned a Pregnancy Risk Category but animal studies failed to show danger with the topical medication. It is unknown if the medication is excreted in breast milk. Azithromycin Counseling:  I discussed with the patient the risks of azithromycin including but not limited to GI upset, allergic reaction, drug rash, diarrhea, and yeast infections. Opioid Counseling: I discussed with the patient the potential side effects of opioids including but not limited to addiction, altered mental status, and depression. I stressed avoiding alcohol, benzodiazepines, muscle relaxants and sleep aids unless specifically okayed by a physician. The patient verbalized understanding of the proper use and possible adverse effects of opioids. All of the patient's questions and concerns were addressed. They were instructed to flush the remaining pills down the toilet if they did not need them for pain. Thalidomide Counseling: I discussed with the patient the risks of thalidomide including but not limited to birth defects, anxiety, weakness, chest pain, dizziness, cough and severe allergy. Gabapentin Counseling: I discussed with the patient the risks of gabapentin including but not limited to dizziness, somnolence, fatigue and ataxia. Solaraze Counseling:  I discussed with the patient the risks of Solaraze including but not limited to erythema, scaling, itching, weeping, crusting, and pain. 5-Fu Counseling: 5-Fluorouracil Counseling:  I discussed with the patient the risks of 5-fluorouracil including but not limited to erythema, scaling, itching, weeping, crusting, and pain. Isotretinoin Pregnancy And Lactation Text: This medication is Pregnancy Category X and is considered extremely dangerous during pregnancy. It is unknown if it is excreted in breast milk. Infliximab Counseling:  I discussed with the patient the risks of infliximab including but not limited to myelosuppression, immunosuppression, autoimmune hepatitis, demyelinating diseases, lymphoma, and serious infections.  The patient understands that monitoring is required including a PPD at baseline and must alert us or the primary physician if symptoms of infection or other concerning signs are noted. Azelaic Acid Pregnancy And Lactation Text: This medication is considered safe during pregnancy and breast feeding. Rifampin Pregnancy And Lactation Text: This medication is Pregnancy Category C and it isn't know if it is safe during pregnancy. It is also excreted in breast milk and should not be used if you are breast feeding. Niacinamide Pregnancy And Lactation Text: These medications are considered safe during pregnancy. Acitretin Counseling:  I discussed with the patient the risks of acitretin including but not limited to hair loss, dry lips/skin/eyes, liver damage, hyperlipidemia, depression/suicidal ideation, photosensitivity.  Serious rare side effects can include but are not limited to pancreatitis, pseudotumor cerebri, bony changes, clot formation/stroke/heart attack.  Patient understands that alcohol is contraindicated since it can result in liver toxicity and significantly prolong the elimination of the drug by many years. Opzelura Pregnancy And Lactation Text: There is insufficient data to evaluate drug-associated risk for major birth defects, miscarriage, or other adverse maternal or fetal outcomes.  There is a pregnancy registry that monitors pregnancy outcomes in pregnant persons exposed to the medication during pregnancy.  It is unknown if this medication is excreted in breast milk.  Do not breastfeed during treatment and for about 4 weeks after the last dose. Cellcept Pregnancy And Lactation Text: This medication is Pregnancy Category D and isn't considered safe during pregnancy. It is unknown if this medication is excreted in breast milk. Oxybutynin Counseling:  I discussed with the patient the risks of oxybutynin including but not limited to skin rash, drowsiness, dry mouth, difficulty urinating, and blurred vision. Itraconazole Counseling:  I discussed with the patient the risks of itraconazole including but not limited to liver damage, nausea/vomiting, neuropathy, and severe allergy.  The patient understands that this medication is best absorbed when taken with acidic beverages such as non-diet cola or ginger ale.  The patient understands that monitoring is required including baseline LFTs and repeat LFTs at intervals.  The patient understands that they are to contact us or the primary physician if concerning signs are noted. Imiquimod Counseling:  I discussed with the patient the risks of imiquimod including but not limited to erythema, scaling, itching, weeping, crusting, and pain.  Patient understands that the inflammatory response to imiquimod is variable from person to person and was educated regarded proper titration schedule.  If flu-like symptoms develop, patient knows to discontinue the medication and contact us. Doxepin Pregnancy And Lactation Text: This medication is Pregnancy Category C and it isn't known if it is safe during pregnancy. It is also excreted in breast milk and breast feeding isn't recommended. Erythromycin Counseling:  I discussed with the patient the risks of erythromycin including but not limited to GI upset, allergic reaction, drug rash, diarrhea, increase in liver enzymes, and yeast infections. Cosentyx Counseling:  I discussed with the patient the risks of Cosentyx including but not limited to worsening of Crohn's disease, immunosuppression, allergic reactions and infections.  The patient understands that monitoring is required including a PPD at baseline and must alert us or the primary physician if symptoms of infection or other concerning signs are noted. Colchicine Counseling:  Patient counseled regarding adverse effects including but not limited to stomach upset (nausea, vomiting, stomach pain, or diarrhea).  Patient instructed to limit alcohol consumption while taking this medication.  Colchicine may reduce blood counts especially with prolonged use.  The patient understands that monitoring of kidney function and blood counts may be required, especially at baseline. The patient verbalized understanding of the proper use and possible adverse effects of colchicine.  All of the patient's questions and concerns were addressed. Tremfya Counseling: I discussed with the patient the risks of guselkumab including but not limited to immunosuppression, serious infections, worsening of inflammatory bowel disease and drug reactions.  The patient understands that monitoring is required including a PPD at baseline and must alert us or the primary physician if symptoms of infection or other concerning signs are noted. Azithromycin Pregnancy And Lactation Text: This medication is considered safe during pregnancy and is also secreted in breast milk. Topical Sulfur Applications Counseling: Topical Sulfur Counseling: Patient counseled that this medication may cause skin irritation or allergic reactions.  In the event of skin irritation, the patient was advised to reduce the amount of the drug applied or use it less frequently.   The patient verbalized understanding of the proper use and possible adverse effects of topical sulfur application.  All of the patient's questions and concerns were addressed. Opioid Pregnancy And Lactation Text: These medications can lead to premature delivery and should be avoided during pregnancy. These medications are also present in breast milk in small amounts. Sski Pregnancy And Lactation Text: This medication is Pregnancy Category D and isn't considered safe during pregnancy. It is excreted in breast milk. Finasteride Pregnancy And Lactation Text: This medication is absolutely contraindicated during pregnancy. It is unknown if it is excreted in breast milk. 5-Fu Pregnancy And Lactation Text: This medication is Pregnancy Category X and contraindicated in pregnancy and in women who may become pregnant. It is unknown if this medication is excreted in breast milk. High Dose Vitamin A Counseling: Side effects reviewed, pt to contact office should one occur. Solaraze Pregnancy And Lactation Text: This medication is Pregnancy Category B and is considered safe. There is some data to suggest avoiding during the third trimester. It is unknown if this medication is excreted in breast milk. Sarecycline Counseling: Patient advised regarding possible photosensitivity and discoloration of the teeth, skin, lips, tongue and gums.  Patient instructed to avoid sunlight, if possible.  When exposed to sunlight, patients should wear protective clothing, sunglasses, and sunscreen.  The patient was instructed to call the office immediately if the following severe adverse effects occur:  hearing changes, easy bruising/bleeding, severe headache, or vision changes.  The patient verbalized understanding of the proper use and possible adverse effects of sarecycline.  All of the patient's questions and concerns were addressed. Siliq Counseling:  I discussed with the patient the risks of Siliq including but not limited to new or worsening depression, suicidal thoughts and behavior, immunosuppression, malignancy, posterior leukoencephalopathy syndrome, and serious infections.  The patient understands that monitoring is required including a PPD at baseline and must alert us or the primary physician if symptoms of infection or other concerning signs are noted. There is also a special program designed to monitor depression which is required with Siliq. Niacinamide Counseling: I recommended taking niacin or niacinamide, also know as vitamin B3, twice daily. Recent evidence suggests that taking vitamin B3 (500 mg twice daily) can reduce the risk of actinic keratoses and non-melanoma skin cancers. Side effects of vitamin B3 include flushing and headache. Picato Counseling:  I discussed with the patient the risks of Picato including but not limited to erythema, scaling, itching, weeping, crusting, and pain. Benzoyl Peroxide Counseling: Patient counseled that medicine may cause skin irritation and bleach clothing.  In the event of skin irritation, the patient was advised to reduce the amount of the drug applied or use it less frequently.   The patient verbalized understanding of the proper use and possible adverse effects of benzoyl peroxide.  All of the patient's questions and concerns were addressed. Erythromycin Pregnancy And Lactation Text: This medication is Pregnancy Category B and is considered safe during pregnancy. It is also excreted in breast milk. Hydroxyzine Counseling: Patient advised that the medication is sedating and not to drive a car after taking this medication.  Patient informed of potential adverse effects including but not limited to dry mouth, urinary retention, and blurry vision.  The patient verbalized understanding of the proper use and possible adverse effects of hydroxyzine.  All of the patient's questions and concerns were addressed. Cellcept Counseling:  I discussed with the patient the risks of mycophenolate mofetil including but not limited to infection/immunosuppression, GI upset, hypokalemia, hypercholesterolemia, bone marrow suppression, lymphoproliferative disorders, malignancy, GI ulceration/bleed/perforation, colitis, interstitial lung disease, kidney failure, progressive multifocal leukoencephalopathy, and birth defects.  The patient understands that monitoring is required including a baseline creatinine and regular CBC testing. In addition, patient must alert us immediately if symptoms of infection or other concerning signs are noted. Cimzia Pregnancy And Lactation Text: This medication crosses the placenta but can be considered safe in certain situations. Cimzia may be excreted in breast milk. Otezla Pregnancy And Lactation Text: This medication is Pregnancy Category C and it isn't known if it is safe during pregnancy. It is unknown if it is excreted in breast milk. Bactrim Counseling:  I discussed with the patient the risks of sulfa antibiotics including but not limited to GI upset, allergic reaction, drug rash, diarrhea, dizziness, photosensitivity, and yeast infections.  Rarely, more serious reactions can occur including but not limited to aplastic anemia, agranulocytosis, methemoglobinemia, blood dyscrasias, liver or kidney failure, lung infiltrates or desquamative/blistering drug rashes. Prednisone Pregnancy And Lactation Text: This medication is Pregnancy Category C and it isn't know if it is safe during pregnancy. This medication is excreted in breast milk. Topical Sulfur Applications Pregnancy And Lactation Text: This medication is Pregnancy Category C and has an unknown safety profile during pregnancy. It is unknown if this topical medication is excreted in breast milk. Finasteride Male Counseling: Finasteride Counseling:  I discussed with the patient the risks of use of finasteride including but not limited to decreased libido, decreased ejaculate volume, gynecomastia, and depression. Women should not handle medication.  All of the patient's questions and concerns were addressed. SSKI Counseling:  I discussed with the patient the risks of SSKI including but not limited to thyroid abnormalities, metallic taste, GI upset, fever, headache, acne, arthralgias, paraesthesias, lymphadenopathy, easy bleeding, arrhythmias, and allergic reaction. Drysol Counseling:  I discussed with the patient the risks of drysol/aluminum chloride including but not limited to skin rash, itching, irritation, burning. Ilumya Counseling: I discussed with the patient the risks of tildrakizumab including but not limited to immunosuppression, malignancy, posterior leukoencephalopathy syndrome, and serious infections.  The patient understands that monitoring is required including a PPD at baseline and must alert us or the primary physician if symptoms of infection or other concerning signs are noted. Sarecycline Pregnancy And Lactation Text: This medication is Pregnancy Category D and not consider safe during pregnancy. It is also excreted in breast milk. Topical Retinoid counseling:  Patient advised to apply a pea-sized amount only at bedtime and wait 30 minutes after washing their face before applying.  If too drying, patient may add a non-comedogenic moisturizer. The patient verbalized understanding of the proper use and possible adverse effects of retinoids.  All of the patient's questions and concerns were addressed. Libtayo Pregnancy And Lactation Text: This medication is contraindicated in pregnancy and when breast feeding. High Dose Vitamin A Pregnancy And Lactation Text: High dose vitamin A therapy is contraindicated during pregnancy and breast feeding. Benzoyl Peroxide Pregnancy And Lactation Text: This medication is Pregnancy Category C. It is unknown if benzoyl peroxide is excreted in breast milk. Ketoconazole Counseling:   Patient counseled regarding improving absorption with orange juice.  Adverse effects include but are not limited to breast enlargement, headache, diarrhea, nausea, upset stomach, liver function test abnormalities, taste disturbance, and stomach pain.  There is a rare possibility of liver failure that can occur when taking ketoconazole. The patient understands that monitoring of LFTs may be required, especially at baseline. The patient verbalized understanding of the proper use and possible adverse effects of ketoconazole.  All of the patient's questions and concerns were addressed. Rituxan Pregnancy And Lactation Text: This medication is Pregnancy Category C and it isn't know if it is safe during pregnancy. It is unknown if this medication is excreted in breast milk but similar antibodies are known to be excreted. Metronidazole Counseling:  I discussed with the patient the risks of metronidazole including but not limited to seizures, nausea/vomiting, a metallic taste in the mouth, nausea/vomiting and severe allergy. Arava Counseling:  Patient counseled regarding adverse effects of Arava including but not limited to nausea, vomiting, abnormalities in liver function tests. Patients may develop mouth sores, rash, diarrhea, and abnormalities in blood counts. The patient understands that monitoring is required including LFTs and blood counts.  There is a rare possibility of scarring of the liver and lung problems that can occur when taking methotrexate. Persistent nausea, loss of appetite, pale stools, dark urine, cough, and shortness of breath should be reported immediately. Patient advised to discontinue Arava treatment and consult with a physician prior to attempting conception. The patient will have to undergo a treatment to eliminate Arava from the body prior to conception. Otezla Counseling: The side effects of Otezla were discussed with the patient, including but not limited to worsening or new depression, weight loss, diarrhea, nausea, upper respiratory tract infection, and headache. Patient instructed to call the office should any adverse effect occur.  The patient verbalized understanding of the proper use and possible adverse effects of Otezla.  All the patient's questions and concerns were addressed. Taltz Counseling: I discussed with the patient the risks of ixekizumab including but not limited to immunosuppression, serious infections, worsening of inflammatory bowel disease and drug reactions.  The patient understands that monitoring is required including a PPD at baseline and must alert us or the primary physician if symptoms of infection or other concerning signs are noted. Hydroxyzine Pregnancy And Lactation Text: This medication is not safe during pregnancy and should not be taken. It is also excreted in breast milk and breast feeding isn't recommended. Wartpeel Counseling:  I discussed with the patient the risks of Wartpeel including but not limited to erythema, scaling, itching, weeping, crusting, and pain. Prednisone Counseling:  I discussed with the patient the risks of prolonged use of prednisone including but not limited to weight gain, insomnia, osteoporosis, mood changes, diabetes, susceptibility to infection, glaucoma and high blood pressure.  In cases where prednisone use is prolonged, patients should be monitored with blood pressure checks, serum glucose levels and an eye exam.  Additionally, the patient may need to be placed on GI prophylaxis, PCP prophylaxis, and calcium and vitamin D supplementation and/or a bisphosphonate.  The patient verbalized understanding of the proper use and the possible adverse effects of prednisone.  All of the patient's questions and concerns were addressed. Klisyri Counseling:  I discussed with the patient the risks of Klisyri including but not limited to erythema, scaling, itching, weeping, crusting, and pain. Spironolactone Pregnancy And Lactation Text: This medication can cause feminization of the male fetus and should be avoided during pregnancy. The active metabolite is also found in breast milk. Cimzia Counseling:  I discussed with the patient the risks of Cimzia including but not limited to immunosuppression, allergic reactions and infections.  The patient understands that monitoring is required including a PPD at baseline and must alert us or the primary physician if symptoms of infection or other concerning signs are noted. Bactrim Pregnancy And Lactation Text: This medication is Pregnancy Category D and is known to cause fetal risk.  It is also excreted in breast milk. Libtayo Counseling- I discussed with the patient the risks of Libtayo including but not limited to nausea, vomiting, diarrhea, and bone or muscle pain.  The patient verbalized understanding of the proper use and possible adverse effects of Libtayo.  All of the patient's questions and concerns were addressed. Use Enhanced Medication Counseling?: No Tetracycline Counseling: Patient counseled regarding possible photosensitivity and increased risk for sunburn.  Patient instructed to avoid sunlight, if possible.  When exposed to sunlight, patients should wear protective clothing, sunglasses, and sunscreen.  The patient was instructed to call the office immediately if the following severe adverse effects occur:  hearing changes, easy bruising/bleeding, severe headache, or vision changes.  The patient verbalized understanding of the proper use and possible adverse effects of tetracycline.  All of the patient's questions and concerns were addressed. Patient understands to avoid pregnancy while on therapy due to potential birth defects. Protopic Counseling: Patient may experience a mild burning sensation during topical application. Protopic is not approved in children less than 2 years of age. There have been case reports of hematologic and skin malignancies in patients using topical calcineurin inhibitors although causality is questionable. Carac Counseling:  I discussed with the patient the risks of Carac including but not limited to erythema, scaling, itching, weeping, crusting, and pain. Ketoconazole Pregnancy And Lactation Text: This medication is Pregnancy Category C and it isn't know if it is safe during pregnancy. It is also excreted in breast milk and breast feeding isn't recommended. Rituxan Counseling:  I discussed with the patient the risks of Rituxan infusions. Side effects can include infusion reactions, severe drug rashes including mucocutaneous reactions, reactivation of latent hepatitis and other infections and rarely progressive multifocal leukoencephalopathy.  All of the patient's questions and concerns were addressed. Metronidazole Pregnancy And Lactation Text: This medication is Pregnancy Category B and considered safe during pregnancy.  It is also excreted in breast milk. Azathioprine Counseling:  I discussed with the patient the risks of azathioprine including but not limited to myelosuppression, immunosuppression, hepatotoxicity, lymphoma, and infections.  The patient understands that monitoring is required including baseline LFTs, Creatinine, possible TPMP genotyping and weekly CBCs for the first month and then every 2 weeks thereafter.  The patient verbalized understanding of the proper use and possible adverse effects of azathioprine.  All of the patient's questions and concerns were addressed. Oral Minoxidil Pregnancy And Lactation Text: This medication should only be used when clearly needed if you are pregnant, attempting to become pregnant or breast feeding. Klisyri Pregnancy And Lactation Text: It is unknown if this medication can harm a developing fetus or if it is excreted in breast milk. Methotrexate Pregnancy And Lactation Text: This medication is Pregnancy Category X and is known to cause fetal harm. This medication is excreted in breast milk. Cibinqo Pregnancy And Lactation Text: It is unknown if this medication will adversely affect pregnancy or breast feeding.  You should not take this medication if you are currently pregnant or planning a pregnancy or while breastfeeding. Spironolactone Counseling: Patient advised regarding risks of diarrhea, abdominal pain, hyperkalemia, birth defects (for female patients), liver toxicity and renal toxicity. The patient may need blood work to monitor liver and kidney function and potassium levels while on therapy. The patient verbalized understanding of the proper use and possible adverse effects of spironolactone.  All of the patient's questions and concerns were addressed. Cephalexin Counseling: I counseled the patient regarding use of cephalexin as an antibiotic for prophylactic and/or therapeutic purposes. Cephalexin (commonly prescribed under brand name Keflex) is a cephalosporin antibiotic which is active against numerous classes of bacteria, including most skin bacteria. Side effects may include nausea, diarrhea, gastrointestinal upset, rash, hives, yeast infections, and in rare cases, hepatitis, kidney disease, seizures, fever, confusion, neurologic symptoms, and others. Patients with severe allergies to penicillin medications are cautioned that there is about a 10% incidence of cross-reactivity with cephalosporins. When possible, patients with penicillin allergies should use alternatives to cephalosporins for antibiotic therapy. Humira Counseling:  I discussed with the patient the risks of adalimumab including but not limited to myelosuppression, immunosuppression, autoimmune hepatitis, demyelinating diseases, lymphoma, and serious infections.  The patient understands that monitoring is required including a PPD at baseline and must alert us or the primary physician if symptoms of infection or other concerning signs are noted. Erivedge Counseling- I discussed with the patient the risks of Erivedge including but not limited to nausea, vomiting, diarrhea, constipation, weight loss, changes in the sense of taste, decreased appetite, muscle spasms, and hair loss.  The patient verbalized understanding of the proper use and possible adverse effects of Erivedge.  All of the patient's questions and concerns were addressed. Elidel Counseling: Patient may experience a mild burning sensation during topical application. Elidel is not approved in children less than 2 years of age. There have been case reports of hematologic and skin malignancies in patients using topical calcineurin inhibitors although causality is questionable. Tazorac Counseling:  Patient advised that medication is irritating and drying.  Patient may need to apply sparingly and wash off after an hour before eventually leaving it on overnight.  The patient verbalized understanding of the proper use and possible adverse effects of tazorac.  All of the patient's questions and concerns were addressed. Albendazole Counseling:  I discussed with the patient the risks of albendazole including but not limited to cytopenia, kidney damage, nausea/vomiting and severe allergy.  The patient understands that this medication is being used in an off-label manner. Hydroxychloroquine Pregnancy And Lactation Text: This medication has been shown to cause fetal harm but it isn't assigned a Pregnancy Risk Category. There are small amounts excreted in breast milk. Aklief counseling:  Patient advised to apply a pea-sized amount only at bedtime and wait 30 minutes after washing their face before applying.  If too drying, patient may add a non-comedogenic moisturizer.  The most commonly reported side effects including irritation, redness, scaling, dryness, stinging, burning, itching, and increased risk of sunburn.  The patient verbalized understanding of the proper use and possible adverse effects of retinoids.  All of the patient's questions and concerns were addressed. Valtrex Pregnancy And Lactation Text: this medication is Pregnancy Category B and is considered safe during pregnancy. This medication is not directly found in breast milk but it's metabolite acyclovir is present. Terbinafine Counseling: Patient counseling regarding adverse effects of terbinafine including but not limited to headache, diarrhea, rash, upset stomach, liver function test abnormalities, itching, taste/smell disturbance, nausea, abdominal pain, and flatulence.  There is a rare possibility of liver failure that can occur when taking terbinafine.  The patient understands that a baseline LFT and kidney function test may be required. The patient verbalized understanding of the proper use and possible adverse effects of terbinafine.  All of the patient's questions and concerns were addressed. Rinvoq Pregnancy And Lactation Text: Based on animal studies, Rinvoq may cause embryo-fetal harm when administered to pregnant women.  The medication should not be used in pregnancy.  Breastfeeding is not recommended during treatment and for 6 days after the last dose. Clofazimine Counseling:  I discussed with the patient the risks of clofazimine including but not limited to skin and eye pigmentation, liver damage, nausea/vomiting, gastrointestinal bleeding and allergy. Minocycline Counseling: Patient advised regarding possible photosensitivity and discoloration of the teeth, skin, lips, tongue and gums.  Patient instructed to avoid sunlight, if possible.  When exposed to sunlight, patients should wear protective clothing, sunglasses, and sunscreen.  The patient was instructed to call the office immediately if the following severe adverse effects occur:  hearing changes, easy bruising/bleeding, severe headache, or vision changes.  The patient verbalized understanding of the proper use and possible adverse effects of minocycline.  All of the patient's questions and concerns were addressed. Protopic Pregnancy And Lactation Text: This medication is Pregnancy Category C. It is unknown if this medication is excreted in breast milk when applied topically. Oral Minoxidil Counseling- I discussed with the patient the risks of oral minoxidil including but not limited to shortness of breath, swelling of the feet or ankles, dizziness, lightheadedness, unwanted hair growth and allergic reaction.  The patient verbalized understanding of the proper use and possible adverse effects of oral minoxidil.  All of the patient's questions and concerns were addressed. Methotrexate Counseling:  Patient counseled regarding adverse effects of methotrexate including but not limited to nausea, vomiting, abnormalities in liver function tests. Patients may develop mouth sores, rash, diarrhea, and abnormalities in blood counts. The patient understands that monitoring is required including LFT's and blood counts.  There is a rare possibility of scarring of the liver and lung problems that can occur when taking methotrexate. Persistent nausea, loss of appetite, pale stools, dark urine, cough, and shortness of breath should be reported immediately. Patient advised to discontinue methotrexate treatment at least three months before attempting to become pregnant.  I discussed the need for folate supplements while taking methotrexate.  These supplements can decrease side effects during methotrexate treatment. The patient verbalized understanding of the proper use and possible adverse effects of methotrexate.  All of the patient's questions and concerns were addressed. Minoxidil Counseling: Minoxidil is a topical medication which can increase blood flow where it is applied. It is uncertain how this medication increases hair growth. Side effects are uncommon and include stinging and allergic reactions. Cibinqo Counseling: I discussed with the patient the risks of Cibinqo therapy including but not limited to common cold, nausea, headache, cold sores, increased blood CPK levels, dizziness, UTIs, fatigue, acne, and vomitting. Live vaccines should be avoided.  This medication has been linked to serious infections; higher rate of mortality; malignancy and lymphoproliferative disorders; major adverse cardiovascular events; thrombosis; thrombocytopenia and lymphopenia; lipid elevations; and retinal detachment. Stelara Counseling:  I discussed with the patient the risks of ustekinumab including but not limited to immunosuppression, malignancy, posterior leukoencephalopathy syndrome, and serious infections.  The patient understands that monitoring is required including a PPD at baseline and must alert us or the primary physician if symptoms of infection or other concerning signs are noted. Cephalexin Pregnancy And Lactation Text: This medication is Pregnancy Category B and considered safe during pregnancy.  It is also excreted in breast milk but can be used safely for shorter doses. Winlevi Counseling:  I discussed with the patient the risks of topical clascoterone including but not limited to erythema, scaling, itching, and stinging. Patient voiced their understanding. Dutasteride Pregnancy And Lactation Text: This medication is absolutely contraindicated in women, especially during pregnancy and breast feeding. Feminization of male fetuses is possible if taking while pregnant. Xolair Pregnancy And Lactation Text: This medication is Pregnancy Category B and is considered safe during pregnancy. This medication is excreted in breast milk. Tazorac Pregnancy And Lactation Text: This medication is not safe during pregnancy. It is unknown if this medication is excreted in breast milk. Birth Control Pills Pregnancy And Lactation Text: This medication should be avoided if pregnant and for the first 30 days post-partum. Detail Level: Simple Valtrex Counseling: I discussed with the patient the risks of valacyclovir including but not limited to kidney damage, nausea, vomiting and severe allergy.  The patient understands that if the infection seems to be worsening or is not improving, they are to call. Rinvoq Counseling: I discussed with the patient the risks of Rinvoq therapy including but not limited to upper respiratory tract infections, shingles, cold sores, bronchitis, nausea, cough, fever, acne, and headache. Live vaccines should be avoided.  This medication has been linked to serious infections; higher rate of mortality; malignancy and lymphoproliferative disorders; major adverse cardiovascular events; thrombosis; thrombocytopenia, anemia, and neutropenia; lipid elevations; liver enzyme elevations; and gastrointestinal perforations. Hydroxychloroquine Counseling:  I discussed with the patient that a baseline ophthalmologic exam is needed at the start of therapy and every year thereafter while on therapy. A CBC may also be warranted for monitoring.  The side effects of this medication were discussed with the patient, including but not limited to agranulocytosis, aplastic anemia, seizures, rashes, retinopathy, and liver toxicity. Patient instructed to call the office should any adverse effect occur.  The patient verbalized understanding of the proper use and possible adverse effects of Plaquenil.  All the patient's questions and concerns were addressed. Qbrexza Counseling:  I discussed with the patient the risks of Qbrexza including but not limited to headache, mydriasis, blurred vision, dry eyes, nasal dryness, dry mouth, dry throat, dry skin, urinary hesitation, and constipation.  Local skin reactions including erythema, burning, stinging, and itching can also occur. Fluconazole Counseling:  Patient counseled regarding adverse effects of fluconazole including but not limited to headache, diarrhea, nausea, upset stomach, liver function test abnormalities, taste disturbance, and stomach pain.  There is a rare possibility of liver failure that can occur when taking fluconazole.  The patient understands that monitoring of LFTs and kidney function test may be required, especially at baseline. The patient verbalized understanding of the proper use and possible adverse effects of fluconazole.  All of the patient's questions and concerns were addressed. Adbry Pregnancy And Lactation Text: It is unknown if this medication will adversely affect pregnancy or breast feeding. Clindamycin Counseling: I counseled the patient regarding use of clindamycin as an antibiotic for prophylactic and/or therapeutic purposes. Clindamycin is active against numerous classes of bacteria, including skin bacteria. Side effects may include nausea, diarrhea, gastrointestinal upset, rash, hives, yeast infections, and in rare cases, colitis. Winlevi Pregnancy And Lactation Text: This medication is considered safe during pregnancy and breastfeeding. Dutasteride Male Counseling: Dustasteride Counseling:  I discussed with the patient the risks of use of dutasteride including but not limited to decreased libido, decreased ejaculate volume, and gynecomastia. Women who can become pregnant should not handle medication.  All of the patient's questions and concerns were addressed. Birth Control Pills Counseling: Birth Control Pill Counseling: I discussed with the patient the potential side effects of OCPs including but not limited to increased risk of stroke, heart attack, thrombophlebitis, deep venous thrombosis, hepatic adenomas, breast changes, GI upset, headaches, and depression.  The patient verbalized understanding of the proper use and possible adverse effects of OCPs. All of the patient's questions and concerns were addressed. Xolair Counseling:  Patient informed of potential adverse effects including but not limited to fever, muscle aches, rash and allergic reactions.  The patient verbalized understanding of the proper use and possible adverse effects of Xolair.  All of the patient's questions and concerns were addressed. Topical Clindamycin Counseling: Patient counseled that this medication may cause skin irritation or allergic reactions.  In the event of skin irritation, the patient was advised to reduce the amount of the drug applied or use it less frequently.   The patient verbalized understanding of the proper use and possible adverse effects of clindamycin.  All of the patient's questions and concerns were addressed. Eucrisa Counseling: Patient may experience a mild burning sensation during topical application. Eucrisa is not approved in children less than 2 years of age. Enbrel Counseling:  I discussed with the patient the risks of etanercept including but not limited to myelosuppression, immunosuppression, autoimmune hepatitis, demyelinating diseases, lymphoma, and infections.  The patient understands that monitoring is required including a PPD at baseline and must alert us or the primary physician if symptoms of infection or other concerning signs are noted. Ivermectin Counseling:  Patient instructed to take medication on an empty stomach with a full glass of water.  Patient informed of potential adverse effects including but not limited to nausea, diarrhea, dizziness, itching, and swelling of the extremities or lymph nodes.  The patient verbalized understanding of the proper use and possible adverse effects of ivermectin.  All of the patient's questions and concerns were addressed. Tranexamic Acid Pregnancy And Lactation Text: It is unknown if this medication is safe during pregnancy or breast feeding. Calcipotriene Pregnancy And Lactation Text: This medication has not been proven safe during pregnancy. It is unknown if this medication is excreted in breast milk. Olumiant Pregnancy And Lactation Text: Based on animal studies, Olumiant may cause embryo-fetal harm when administered to pregnant women.  The medication should not be used in pregnancy.  Breastfeeding is not recommended during treatment. Glycopyrrolate Pregnancy And Lactation Text: This medication is Pregnancy Category B and is considered safe during pregnancy. It is unknown if it is excreted breast milk. Qbrexza Pregnancy And Lactation Text: There is no available data on Qbrexza use in pregnant women.  There is no available data on Qbrexza use in lactation. Odomzo Counseling- I discussed with the patient the risks of Odomzo including but not limited to nausea, vomiting, diarrhea, constipation, weight loss, changes in the sense of taste, decreased appetite, muscle spasms, and hair loss.  The patient verbalized understanding of the proper use and possible adverse effects of Odomzo.  All of the patient's questions and concerns were addressed. Bexarotene Pregnancy And Lactation Text: This medication is Pregnancy Category X and should not be given to women who are pregnant or may become pregnant. This medication should not be used if you are breast feeding. Quinolones Counseling:  I discussed with the patient the risks of fluoroquinolones including but not limited to GI upset, allergic reaction, drug rash, diarrhea, dizziness, photosensitivity, yeast infections, liver function test abnormalities, tendonitis/tendon rupture. Mirvaso Counseling: Mirvaso is a topical medication which can decrease superficial blood flow where applied. Side effects are uncommon and include stinging, redness and allergic reactions. Skyrizi Counseling: I discussed with the patient the risks of risankizumab-rzaa including but not limited to immunosuppression, and serious infections.  The patient understands that monitoring is required including a PPD at baseline and must alert us or the primary physician if symptoms of infection or other concerning signs are noted. Adbry Counseling: I discussed with the patient the risks of tralokinumab including but not limited to eye infection and irritation, cold sores, injection site reactions, worsening of asthma, allergic reactions and increased risk of parasitic infection.  Live vaccines should be avoided while taking tralokinumab. The patient understands that monitoring is required and they must alert us or the primary physician if symptoms of infection or other concerning signs are noted. Zyclara Counseling:  I discussed with the patient the risks of imiquimod including but not limited to erythema, scaling, itching, weeping, crusting, and pain.  Patient understands that the inflammatory response to imiquimod is variable from person to person and was educated regarded proper titration schedule.  If flu-like symptoms develop, patient knows to discontinue the medication and contact us. Clindamycin Pregnancy And Lactation Text: This medication can be used in pregnancy if certain situations. Clindamycin is also present in breast milk. Cyclosporine Counseling:  I discussed with the patient the risks of cyclosporine including but not limited to hypertension, gingival hyperplasia,myelosuppression, immunosuppression, liver damage, kidney damage, neurotoxicity, lymphoma, and serious infections. The patient understands that monitoring is required including baseline blood pressure, CBC, CMP, lipid panel and uric acid, and then 1-2 times monthly CMP and blood pressure. Xelkokoz Pregnancy And Lactation Text: This medication is Pregnancy Category D and is not considered safe during pregnancy.  The risk during breast feeding is also uncertain. Dapsone Pregnancy And Lactation Text: This medication is Pregnancy Category C and is not considered safe during pregnancy or breast feeding. Propranolol Pregnancy And Lactation Text: This medication is Pregnancy Category C and it isn't known if it is safe during pregnancy. It is excreted in breast milk. Cimetidine Counseling:  I discussed with the patient the risks of Cimetidine including but not limited to gynecomastia, headache, diarrhea, nausea, drowsiness, arrhythmias, pancreatitis, skin rashes, psychosis, bone marrow suppression and kidney toxicity.

## 2022-09-19 NOTE — H&P ADULT - NS MD HP PULSE RADIAL
If you are a smoker, it is important for your health to stop smoking. Please be aware that second hand smoke is also harmful. left normal/right normal

## 2022-10-09 NOTE — PROGRESS NOTE ADULT - SUBJECTIVE AND OBJECTIVE BOX
INTERVAL HPI:  64 yo male with history of abnormal cxr( was taken for immigration purpose in [place] per sister).  [place] in [place] on June 5th with Imm[place] Visa.  presented  with confusion As per sister patient seems confused and nonverbal ,  responsive at times (10 Jul 2018 15:20)  Pt confused, not able to provide history. Spoke with sister at bedside. According to her no sob, cough, fever, night sweat or weight loss.  seen for suspected TB as CXR reported abnormal before coming to [place].    OVERNIGHT EVENTS:  Resting, no distress.    Vital Signs Last 24 Hrs  T(C): 36.3 (15 Jul 2018 12:14), Max: 36.5 (14 Jul 2018 16:52)  T(F): 97.4 (15 Jul 2018 12:14), Max: 97.7 (14 Jul 2018 16:52)  HR: 74 (15 Jul 2018 12:14) (68 - 74)  BP: 146/91 (15 Jul 2018 12:14) (136/88 - 155/87)  BP(mean): --  RR: 18 (15 Jul 2018 12:14) (17 - 18)  SpO2: 99% (15 Jul 2018 12:14) (93% - 99%)    PHYSICAL EXAM:  GEN:         Awake, responsive and comfortable.  HEENT:    Normal.    RESP:        no wheezing.  CVS:           Regular rate and rhythm.   ABD:         Soft, non-tender, non-distended;     MEDICATIONS  (STANDING):  amLODIPine   Tablet 10 milliGRAM(s) Oral daily  aspirin enteric coated 81 milliGRAM(s) Oral daily  cyanocobalamin Injectable 1000 MICROGram(s) IntraMuscular daily  enoxaparin Injectable 40 milliGRAM(s) SubCutaneous every 24 hours  metoprolol tartrate 50 milliGRAM(s) Oral two times a day  sodium chloride 0.45%. 1000 milliLiter(s) (70 mL/Hr) IV Continuous <Continuous>    LABS:                        14.2   4.50  )-----------( 333      ( 14 Jul 2018 07:50 )             43.1     07-14    140  |  105  |  19  ----------------------------<  82  3.8   |  30  |  1.04    Ca    9.2      14 Jul 2018 07:50    07-11 @ 00:30  pH: 7.44  pCO2: 44  pO2: 90  SaO2: 97    ASSESSMENT AND PLAN:  ·	Altered mental status.  ·	Right occipital  infarct.  ·	Abnormal Chest X Ray.  ·	HTN  ·	Dehydration.    AFB x 3 negative. No pulmonary symptoms.  DC Air borne isolation and can be discharged from pulmonary point of view. 09-Oct-2022 08:45

## 2022-12-08 NOTE — H&P ADULT - BACK
Report received from cardiac rehab, report reviewed with Dr. Stokes. Per MD no new changes at this time.   
No deformity or limitation of movement

## 2023-04-25 NOTE — H&P ADULT - PROBLEM SELECTOR PLAN 2
57 yo M presents for evaluation of "heavy rectal bleeding," referred by Dr. Aime Deleon  PMH HTN, HLD, sleep apnea, kidney stones  PSH hernia repair, partial colon resection, h/o RBL x 2 (1990s, and recently w/ Dr. Deleon)     - Family history:  Denies [facility] colorectal CA  Last colonoscopy 3/10/23 at [facility], no findings per pt.  h/o colon polyps and diverticulosis throughout colon per pt    c/o of h/o heavy rectal bleeding over the years. Worsened in 2018 and had rectal bleeding a few times while at work. Had presented to ERs in the past, colonoscopies have been completed multiple times, no identifiable source of bleeding. 3/2018 h/o syncopal episode, seen at [facility], s/p transfusions x 3.   April 2019 seen by CRS Korey Godinez who suggested colon resection for diverticulosis as possible source of bleed  Aug 2021 Hospitalized again at [facility] for rectal bleeding, s/p blood transfusion x 3, transferred to [facility] in [place], bleeding eventually resolved. Colonoscopy completed, s/p partial colon resection 10/2021 w/ Dr. Godinez for "bleeding vein"    Pt had been doing well and no bleeding episodes until December 2022. Pt had return of bleeding in setting of straining and presented to [facility], hospitalized, colonoscopy performed and area of active bleed stapled per pt. A couple months later had bleeding again which resolved on its own. Colonoscopy noted polyp which was removed.   Had BRB again, seen at [facility] ER, hgb over 7, no imaging or interventions per pt.  Seen by LAMIN Deleon, s/p rubber band ligation ~ one month ago which he tolerated well.  This past Thursday had three instances of heavy bleeding. Seen at [facility] ER, had more rectal bleeding, pt reports while waiting he stood up, had syncopal episode, then pt noticed "spurting" blood, s/p transfusion. Colonoscopy completed which was normal per pt and he was discharged.     BMs have been varying since most recent hospitalization. Pt had diarrhea this past weekend and treated w/ Imodium then didn't have a BM, now taking stool softener occasionally.    BH: typically daily, however the last 6 weeks was having 3-4 smaller BMs and felt incomplete evacuation  Takes Metamucil 1-2 times per week  Drinking 3-4 glasses of tea (one caffeinated, remaining are herbal). Otherwise doesn't drink water  Denies ASA/NSAID use    BH:   intake of dietary fiber and water   use of stool softeners or fiber supplements norvasc 10 mg daily

## 2025-02-04 NOTE — CONSULT NOTE ADULT - PROBLEM SELECTOR PROBLEM 1
Acute appendicitis, unspecified acute appendicitis type
"I have sleep apnea."